# Patient Record
Sex: MALE | Race: BLACK OR AFRICAN AMERICAN | Employment: OTHER
[De-identification: names, ages, dates, MRNs, and addresses within clinical notes are randomized per-mention and may not be internally consistent; named-entity substitution may affect disease eponyms.]

---

## 2017-01-30 RX ORDER — FUROSEMIDE 80 MG
TABLET ORAL
Qty: 30 TABLET | Refills: 3 | Status: SHIPPED | OUTPATIENT
Start: 2017-01-30 | End: 2017-02-28 | Stop reason: SDUPTHER

## 2017-01-30 RX ORDER — ATORVASTATIN CALCIUM 40 MG/1
TABLET, FILM COATED ORAL
Qty: 30 TABLET | Refills: 3 | Status: SHIPPED | OUTPATIENT
Start: 2017-01-30 | End: 2017-06-26 | Stop reason: SDUPTHER

## 2017-02-06 ENCOUNTER — TELEPHONE (OUTPATIENT)
Dept: FAMILY MEDICINE CLINIC | Facility: CLINIC | Age: 67
End: 2017-02-06

## 2017-02-06 DIAGNOSIS — E11.9 TYPE 2 DIABETES MELLITUS WITHOUT COMPLICATION, WITH LONG-TERM CURRENT USE OF INSULIN (HCC): Primary | ICD-10-CM

## 2017-02-06 DIAGNOSIS — Z79.4 TYPE 2 DIABETES MELLITUS WITHOUT COMPLICATION, WITH LONG-TERM CURRENT USE OF INSULIN (HCC): Primary | ICD-10-CM

## 2017-02-15 RX ORDER — METOPROLOL SUCCINATE 25 MG/1
TABLET, EXTENDED RELEASE ORAL
Qty: 30 TABLET | Refills: 3 | Status: SHIPPED | OUTPATIENT
Start: 2017-02-15 | End: 2017-05-24 | Stop reason: SDUPTHER

## 2017-02-28 ENCOUNTER — OFFICE VISIT (OUTPATIENT)
Dept: FAMILY MEDICINE CLINIC | Facility: CLINIC | Age: 67
End: 2017-02-28

## 2017-02-28 VITALS
SYSTOLIC BLOOD PRESSURE: 140 MMHG | DIASTOLIC BLOOD PRESSURE: 70 MMHG | BODY MASS INDEX: 23.62 KG/M2 | RESPIRATION RATE: 16 BRPM | WEIGHT: 184 LBS | OXYGEN SATURATION: 97 % | HEART RATE: 81 BPM

## 2017-02-28 DIAGNOSIS — Z13.1 DM (DIABETES MELLITUS SCREEN): Primary | ICD-10-CM

## 2017-02-28 DIAGNOSIS — E78.00 HIGH CHOLESTEROL: ICD-10-CM

## 2017-02-28 PROBLEM — N18.6 END STAGE KIDNEY DISEASE (HCC): Status: ACTIVE | Noted: 2017-02-23

## 2017-02-28 PROBLEM — I10 BENIGN ESSENTIAL HTN: Status: ACTIVE | Noted: 2017-02-23

## 2017-02-28 PROCEDURE — 99213 OFFICE O/P EST LOW 20 MIN: CPT | Performed by: FAMILY MEDICINE

## 2017-02-28 RX ORDER — FUROSEMIDE 80 MG
TABLET ORAL
Qty: 20 TABLET | Refills: 3 | Status: SHIPPED | OUTPATIENT
Start: 2017-02-28 | End: 2018-11-05 | Stop reason: ALTCHOICE

## 2017-02-28 RX ORDER — POTASSIUM CHLORIDE 20 MEQ/1
TABLET, EXTENDED RELEASE ORAL
Qty: 30 TABLET | Refills: 3 | Status: SHIPPED | OUTPATIENT
Start: 2017-02-28 | End: 2018-11-05

## 2017-03-06 RX ORDER — HYDRALAZINE HYDROCHLORIDE 25 MG/1
TABLET, FILM COATED ORAL
Qty: 120 TABLET | Refills: 0 | Status: SHIPPED | OUTPATIENT
Start: 2017-03-06 | End: 2017-04-10 | Stop reason: SDUPTHER

## 2017-03-28 ENCOUNTER — HOSPITAL ENCOUNTER (OUTPATIENT)
Dept: DIABETES SERVICES | Age: 67
Setting detail: THERAPIES SERIES
Discharge: HOME | End: 2017-03-28
Payer: COMMERCIAL

## 2017-03-28 VITALS
BODY MASS INDEX: 23.78 KG/M2 | SYSTOLIC BLOOD PRESSURE: 138 MMHG | WEIGHT: 185.19 LBS | HEART RATE: 75 BPM | DIASTOLIC BLOOD PRESSURE: 73 MMHG

## 2017-03-28 DIAGNOSIS — E11.8 TYPE 2 DIABETES MELLITUS WITH COMPLICATION, WITH LONG-TERM CURRENT USE OF INSULIN (HCC): Primary | ICD-10-CM

## 2017-03-28 DIAGNOSIS — Z79.4 TYPE 2 DIABETES MELLITUS WITH COMPLICATION, WITH LONG-TERM CURRENT USE OF INSULIN (HCC): Primary | ICD-10-CM

## 2017-03-28 PROCEDURE — G0108 DIAB MANAGE TRN  PER INDIV: HCPCS

## 2017-04-10 RX ORDER — ASCORBIC ACID, THIAMINE, RIBOFLAVIN, NIACINAMIDE, PYRIDOXINE, FOLIC ACID, COBALAMIN, BIOTIN, PANTOTHENIC ACID, ZINC 100; 1.5; 1.7; 20; 10; 1; 6; 300; 10; 5 MG/1; MG/1; MG/1; MG/1; MG/1; MG/1; UG/1; UG/1; MG/1; MG/1
1 TABLET, COATED ORAL DAILY
Qty: 30 TABLET | Refills: 0 | Status: SHIPPED | OUTPATIENT
Start: 2017-04-10 | End: 2017-05-12 | Stop reason: SDUPTHER

## 2017-04-10 RX ORDER — HYDRALAZINE HYDROCHLORIDE 25 MG/1
TABLET, FILM COATED ORAL
Qty: 120 TABLET | Refills: 0 | Status: SHIPPED | OUTPATIENT
Start: 2017-04-10 | End: 2017-05-11 | Stop reason: SDUPTHER

## 2017-04-10 RX ORDER — AMLODIPINE BESYLATE 10 MG/1
TABLET ORAL
Qty: 60 TABLET | Refills: 0 | Status: SHIPPED | OUTPATIENT
Start: 2017-04-10 | End: 2017-05-11 | Stop reason: SDUPTHER

## 2017-04-11 ENCOUNTER — HOSPITAL ENCOUNTER (OUTPATIENT)
Dept: DIABETES SERVICES | Age: 67
Setting detail: THERAPIES SERIES
Discharge: HOME | End: 2017-04-11
Payer: COMMERCIAL

## 2017-04-11 ENCOUNTER — TELEPHONE (OUTPATIENT)
Dept: FAMILY MEDICINE CLINIC | Age: 67
End: 2017-04-11

## 2017-04-11 DIAGNOSIS — E11.8 TYPE 2 DIABETES MELLITUS WITH COMPLICATION, WITH LONG-TERM CURRENT USE OF INSULIN (HCC): Primary | ICD-10-CM

## 2017-04-11 DIAGNOSIS — Z79.4 TYPE 2 DIABETES MELLITUS WITH COMPLICATION, WITH LONG-TERM CURRENT USE OF INSULIN (HCC): Primary | ICD-10-CM

## 2017-04-11 PROCEDURE — G0108 DIAB MANAGE TRN  PER INDIV: HCPCS

## 2017-05-09 ENCOUNTER — HOSPITAL ENCOUNTER (OUTPATIENT)
Dept: DIABETES SERVICES | Age: 67
Setting detail: THERAPIES SERIES
Discharge: HOME | End: 2017-05-09
Payer: COMMERCIAL

## 2017-05-09 DIAGNOSIS — E11.8 TYPE 2 DIABETES MELLITUS WITH COMPLICATION, WITH LONG-TERM CURRENT USE OF INSULIN (HCC): Primary | ICD-10-CM

## 2017-05-09 DIAGNOSIS — Z79.4 TYPE 2 DIABETES MELLITUS WITH COMPLICATION, WITH LONG-TERM CURRENT USE OF INSULIN (HCC): Primary | ICD-10-CM

## 2017-05-09 PROCEDURE — G0108 DIAB MANAGE TRN  PER INDIV: HCPCS

## 2017-05-11 RX ORDER — HYDRALAZINE HYDROCHLORIDE 25 MG/1
TABLET, FILM COATED ORAL
Qty: 120 TABLET | Refills: 3 | Status: SHIPPED | OUTPATIENT
Start: 2017-05-11 | End: 2018-11-05 | Stop reason: ALTCHOICE

## 2017-05-11 RX ORDER — AMLODIPINE BESYLATE 10 MG/1
TABLET ORAL
Qty: 60 TABLET | Refills: 3 | Status: SHIPPED | OUTPATIENT
Start: 2017-05-11 | End: 2017-09-09 | Stop reason: SDUPTHER

## 2017-05-12 ENCOUNTER — TELEPHONE (OUTPATIENT)
Dept: FAMILY MEDICINE CLINIC | Age: 67
End: 2017-05-12

## 2017-05-15 RX ORDER — ASCORBIC ACID, THIAMINE, RIBOFLAVIN, NIACINAMIDE, PYRIDOXINE, FOLIC ACID, COBALAMIN, BIOTIN, PANTOTHENIC ACID, ZINC 100; 1.5; 1.7; 20; 10; 1; 6; 300; 10; 5 MG/1; MG/1; MG/1; MG/1; MG/1; MG/1; UG/1; UG/1; MG/1; MG/1
1 TABLET, COATED ORAL DAILY
Qty: 30 TABLET | Refills: 3 | Status: SHIPPED | OUTPATIENT
Start: 2017-05-15 | End: 2017-06-29 | Stop reason: SDUPTHER

## 2017-05-23 ENCOUNTER — HOSPITAL ENCOUNTER (OUTPATIENT)
Dept: DIABETES SERVICES | Age: 67
Setting detail: THERAPIES SERIES
Discharge: HOME | End: 2017-05-23
Payer: COMMERCIAL

## 2017-05-23 DIAGNOSIS — E11.8 TYPE 2 DIABETES MELLITUS WITH COMPLICATION, WITH LONG-TERM CURRENT USE OF INSULIN (HCC): Primary | ICD-10-CM

## 2017-05-23 DIAGNOSIS — Z79.4 TYPE 2 DIABETES MELLITUS WITH COMPLICATION, WITH LONG-TERM CURRENT USE OF INSULIN (HCC): Primary | ICD-10-CM

## 2017-05-23 PROCEDURE — G0108 DIAB MANAGE TRN  PER INDIV: HCPCS

## 2017-05-25 RX ORDER — METOPROLOL SUCCINATE 25 MG/1
TABLET, EXTENDED RELEASE ORAL
Qty: 30 TABLET | Refills: 3 | Status: SHIPPED | OUTPATIENT
Start: 2017-05-25 | End: 2017-10-19 | Stop reason: SDUPTHER

## 2017-05-25 RX ORDER — HUMAN INSULIN 100 [USP'U]/ML
INJECTION, SUSPENSION SUBCUTANEOUS
Qty: 10 ML | Refills: 3 | Status: SHIPPED | OUTPATIENT
Start: 2017-05-25 | End: 2017-07-25 | Stop reason: DRUGHIGH

## 2017-06-01 ENCOUNTER — HOSPITAL ENCOUNTER (OUTPATIENT)
Dept: DIABETES SERVICES | Age: 67
Setting detail: THERAPIES SERIES
Discharge: HOME | End: 2017-06-01
Payer: COMMERCIAL

## 2017-06-01 DIAGNOSIS — E11.8 TYPE 2 DIABETES MELLITUS WITH COMPLICATION, WITH LONG-TERM CURRENT USE OF INSULIN (HCC): Primary | ICD-10-CM

## 2017-06-01 DIAGNOSIS — Z79.4 TYPE 2 DIABETES MELLITUS WITH COMPLICATION, WITH LONG-TERM CURRENT USE OF INSULIN (HCC): Primary | ICD-10-CM

## 2017-06-01 PROCEDURE — G0109 DIAB MANAGE TRN IND/GROUP: HCPCS

## 2017-06-27 RX ORDER — ATORVASTATIN CALCIUM 40 MG/1
TABLET, FILM COATED ORAL
Qty: 30 TABLET | Refills: 3 | Status: SHIPPED | OUTPATIENT
Start: 2017-06-27 | End: 2017-11-20 | Stop reason: SDUPTHER

## 2017-06-27 RX ORDER — LIDOCAINE AND PRILOCAINE 25; 25 MG/G; MG/G
CREAM TOPICAL
Qty: 30 G | Refills: 3 | Status: SHIPPED | OUTPATIENT
Start: 2017-06-27 | End: 2018-11-05

## 2017-06-29 ENCOUNTER — OFFICE VISIT (OUTPATIENT)
Dept: FAMILY MEDICINE CLINIC | Age: 67
End: 2017-06-29
Payer: COMMERCIAL

## 2017-06-29 VITALS
WEIGHT: 185 LBS | DIASTOLIC BLOOD PRESSURE: 60 MMHG | SYSTOLIC BLOOD PRESSURE: 120 MMHG | RESPIRATION RATE: 16 BRPM | OXYGEN SATURATION: 98 % | HEART RATE: 75 BPM | BODY MASS INDEX: 23.75 KG/M2

## 2017-06-29 DIAGNOSIS — Z79.4 TYPE 2 DIABETES MELLITUS WITH DIABETIC POLYNEUROPATHY, WITH LONG-TERM CURRENT USE OF INSULIN (HCC): ICD-10-CM

## 2017-06-29 DIAGNOSIS — M79.18 CERVICAL MYOFASCIAL PAIN SYNDROME: ICD-10-CM

## 2017-06-29 DIAGNOSIS — E11.42 TYPE 2 DIABETES MELLITUS WITH DIABETIC POLYNEUROPATHY, WITH LONG-TERM CURRENT USE OF INSULIN (HCC): ICD-10-CM

## 2017-06-29 DIAGNOSIS — I10 BENIGN ESSENTIAL HTN: Primary | ICD-10-CM

## 2017-06-29 PROCEDURE — 99214 OFFICE O/P EST MOD 30 MIN: CPT | Performed by: FAMILY MEDICINE

## 2017-06-29 RX ORDER — INSULIN ASPART 100 [IU]/ML
20 INJECTION, SUSPENSION SUBCUTANEOUS
COMMUNITY
End: 2018-11-05

## 2017-06-29 RX ORDER — ASCORBIC ACID, THIAMINE, RIBOFLAVIN, NIACINAMIDE, PYRIDOXINE, FOLIC ACID, COBALAMIN, BIOTIN, PANTOTHENIC ACID, ZINC 100; 1.5; 1.7; 20; 10; 1; 6; 300; 10; 5 MG/1; MG/1; MG/1; MG/1; MG/1; MG/1; UG/1; UG/1; MG/1; MG/1
1 TABLET, COATED ORAL DAILY
Qty: 30 TABLET | Refills: 3 | Status: SHIPPED | OUTPATIENT
Start: 2017-06-29 | End: 2018-11-05 | Stop reason: ALTCHOICE

## 2017-06-29 ASSESSMENT — PATIENT HEALTH QUESTIONNAIRE - PHQ9
1. LITTLE INTEREST OR PLEASURE IN DOING THINGS: 0
SUM OF ALL RESPONSES TO PHQ9 QUESTIONS 1 & 2: 0
2. FEELING DOWN, DEPRESSED OR HOPELESS: 0
SUM OF ALL RESPONSES TO PHQ QUESTIONS 1-9: 0

## 2017-07-25 ENCOUNTER — OFFICE VISIT (OUTPATIENT)
Dept: FAMILY MEDICINE CLINIC | Age: 67
End: 2017-07-25
Payer: COMMERCIAL

## 2017-07-25 VITALS
SYSTOLIC BLOOD PRESSURE: 112 MMHG | HEIGHT: 74 IN | HEART RATE: 72 BPM | DIASTOLIC BLOOD PRESSURE: 74 MMHG | WEIGHT: 183.8 LBS | BODY MASS INDEX: 23.59 KG/M2

## 2017-07-25 DIAGNOSIS — L30.9 DERMATITIS: Primary | ICD-10-CM

## 2017-07-25 PROCEDURE — 99213 OFFICE O/P EST LOW 20 MIN: CPT

## 2017-08-09 ASSESSMENT — ENCOUNTER SYMPTOMS
COUGH: 0
SORE THROAT: 0
SHORTNESS OF BREATH: 0
NAIL CHANGES: 0
EYE PAIN: 0

## 2017-09-07 ENCOUNTER — OFFICE VISIT (OUTPATIENT)
Dept: FAMILY MEDICINE CLINIC | Age: 67
End: 2017-09-07
Payer: COMMERCIAL

## 2017-09-07 VITALS
SYSTOLIC BLOOD PRESSURE: 118 MMHG | HEART RATE: 54 BPM | OXYGEN SATURATION: 98 % | RESPIRATION RATE: 16 BRPM | WEIGHT: 176 LBS | DIASTOLIC BLOOD PRESSURE: 52 MMHG | BODY MASS INDEX: 22.6 KG/M2

## 2017-09-07 DIAGNOSIS — K59.03 DRUG-INDUCED CONSTIPATION: ICD-10-CM

## 2017-09-07 DIAGNOSIS — E11.69 TYPE 2 DIABETES MELLITUS WITH OTHER SPECIFIED COMPLICATION, UNSPECIFIED LONG TERM INSULIN USE STATUS: ICD-10-CM

## 2017-09-07 DIAGNOSIS — Z94.0 RENAL TRANSPLANT, STATUS POST: Primary | ICD-10-CM

## 2017-09-07 PROCEDURE — 99213 OFFICE O/P EST LOW 20 MIN: CPT | Performed by: FAMILY MEDICINE

## 2017-09-07 RX ORDER — MYCOPHENOLIC ACID 180 MG/1
180 TABLET, DELAYED RELEASE ORAL 2 TIMES DAILY
COMMUNITY
End: 2019-07-03 | Stop reason: ALTCHOICE

## 2017-09-07 RX ORDER — SULFAMETHOXAZOLE AND TRIMETHOPRIM 400; 80 MG/1; MG/1
1 TABLET ORAL 2 TIMES DAILY
COMMUNITY
End: 2017-12-12 | Stop reason: ALTCHOICE

## 2017-09-07 RX ORDER — VALGANCICLOVIR 450 MG/1
450 TABLET, FILM COATED ORAL DAILY
COMMUNITY
End: 2018-11-05

## 2017-09-07 RX ORDER — OXYCODONE HYDROCHLORIDE AND ACETAMINOPHEN 5; 325 MG/1; MG/1
1 TABLET ORAL EVERY 4 HOURS PRN
COMMUNITY
End: 2018-11-05

## 2017-09-07 RX ORDER — PREDNISONE 1 MG/1
10 TABLET ORAL DAILY
COMMUNITY
End: 2019-07-03 | Stop reason: ALTCHOICE

## 2017-09-07 RX ORDER — ONDANSETRON 4 MG/1
4 TABLET, ORALLY DISINTEGRATING ORAL EVERY 8 HOURS PRN
COMMUNITY
End: 2018-11-05

## 2017-09-11 RX ORDER — AMLODIPINE BESYLATE 10 MG/1
TABLET ORAL
Qty: 60 TABLET | Refills: 2 | Status: SHIPPED | OUTPATIENT
Start: 2017-09-11 | End: 2018-11-05 | Stop reason: ALTCHOICE

## 2017-09-27 ENCOUNTER — TELEPHONE (OUTPATIENT)
Dept: FAMILY MEDICINE CLINIC | Age: 67
End: 2017-09-27

## 2017-10-19 RX ORDER — METOPROLOL SUCCINATE 25 MG/1
TABLET, EXTENDED RELEASE ORAL
Qty: 30 TABLET | Refills: 1 | Status: SHIPPED | OUTPATIENT
Start: 2017-10-19 | End: 2017-11-20 | Stop reason: SDUPTHER

## 2017-10-19 NOTE — TELEPHONE ENCOUNTER
Next Visit Date:  Future Appointments  Date Time Provider Soham Kellyi   12/12/2017 1:00 PM Murali Palacio  [address] [facility]   Topic Date Due    AAA screen  1950    Diabetic microalbuminuria test  01/04/1969    DTaP/Tdap/Td vaccine (1 - Tdap) 01/04/1970    Zostavax vaccine  01/04/2011    Pneumococcal high/highest risk (2 of 2 - PPSV23) 10/23/2016    Diabetic retinal exam  08/16/2017    Flu vaccine (1) 09/01/2017    Diabetic foot exam  02/28/2018    Diabetic hemoglobin A1C test  08/25/2018    Lipid screen  10/11/2018    Colon cancer screen colonoscopy  05/01/2023       Hemoglobin A1C (%)   Date Value   08/25/2017 6.9 (H)   05/02/2017 7.5 (H)   07/12/2016 8.2 (H)             ( goal A1C is < 7)   No results found for: LABMICR  LDL Cholesterol (mg/dL)   Date Value   10/11/2017 132 (H)     LDL Calculated (mg/dL)   Date Value   11/04/2015 107       (goal LDL is <100)   AST (IU/L)   Date Value   10/11/2017 22     ALT (IU/L)   Date Value   10/11/2017 23     BUN (mg/dL)   Date Value   10/11/2017 25     BP Readings from Last 3 Encounters:   09/07/17 (!) 118/52   07/25/17 112/74   06/29/17 120/60          (goal 120/80)    All Future Testing planned in [facility]              Patient Active Problem List:     Diabetes mellitus ([facility] [address]     HTN (hypertension)     Elevated serum cholesterol     Renal insufficiency     Hepatitis C virus infection     Coronary artery disease involving native coronary artery without angina pectoris     End stage kidney disease (HCC)     Benign essential HTN

## 2017-10-25 ENCOUNTER — IMMUNIZATION (OUTPATIENT)
Dept: FAMILY MEDICINE CLINIC | Age: 67
End: 2017-10-25
Payer: COMMERCIAL

## 2017-10-25 DIAGNOSIS — Z23 IMMUNIZATION DUE: Primary | ICD-10-CM

## 2017-10-25 PROCEDURE — 90662 IIV NO PRSV INCREASED AG IM: CPT | Performed by: FAMILY MEDICINE

## 2017-10-25 PROCEDURE — 90471 IMMUNIZATION ADMIN: CPT | Performed by: FAMILY MEDICINE

## 2017-11-20 ENCOUNTER — TELEPHONE (OUTPATIENT)
Dept: FAMILY MEDICINE CLINIC | Age: 67
End: 2017-11-20

## 2017-11-20 RX ORDER — METOPROLOL SUCCINATE 25 MG/1
TABLET, EXTENDED RELEASE ORAL
Qty: 30 TABLET | Refills: 3 | Status: SHIPPED | OUTPATIENT
Start: 2017-11-20 | End: 2017-12-12 | Stop reason: SDUPTHER

## 2017-11-20 RX ORDER — ATORVASTATIN CALCIUM 40 MG/1
TABLET, FILM COATED ORAL
Qty: 30 TABLET | Refills: 3 | Status: SHIPPED | OUTPATIENT
Start: 2017-11-20 | End: 2018-02-16 | Stop reason: SDUPTHER

## 2017-11-20 NOTE — TELEPHONE ENCOUNTER
Next Visit Date:  Future Appointments  Date Time Provider Soham Kellyi   12/12/2017 1:00 PM Brina Loja  [address] [facility] Maintenance   Topic Date Due    AAA screen  1950    Diabetic microalbuminuria test  01/04/1969    DTaP/Tdap/Td vaccine (1 - Tdap) 01/04/1970    Zostavax vaccine  01/04/2011    Diabetic retinal exam  08/16/2017    Diabetic foot exam  02/28/2018    Diabetic hemoglobin A1C test  11/17/2018    Lipid screen  11/17/2018    Colon cancer screen colonoscopy  05/01/2023    Flu vaccine  Completed    Pneumococcal high/highest risk  Completed       Hemoglobin A1C (%)   Date Value   11/17/2017 7.5 (H)   08/25/2017 6.9 (H)   05/02/2017 7.5 (H)             ( goal A1C is < 7)   No results found for: LABMICR  LDL Cholesterol (mg/dL)   Date Value   11/17/2017 142 (H)     LDL Calculated (mg/dL)   Date Value   11/04/2015 107       (goal LDL is <100)   AST (IU/L)   Date Value   11/17/2017 19     ALT (IU/L)   Date Value   11/17/2017 20     BUN (mg/dL)   Date Value   11/17/2017 32 (H)     BP Readings from Last 3 Encounters:   09/07/17 (!) 118/52   07/25/17 112/74   06/29/17 120/60          (goal 120/80)    All Future Testing planned in [facility]              Patient Active Problem List:     Diabetes mellitus ([facility] [address]     HTN (hypertension)     Elevated serum cholesterol     Renal insufficiency     Hepatitis C virus infection     Coronary artery disease involving native coronary artery without angina pectoris     End stage kidney disease (HCC)     Benign essential HTN

## 2017-12-12 ENCOUNTER — OFFICE VISIT (OUTPATIENT)
Dept: FAMILY MEDICINE CLINIC | Age: 67
End: 2017-12-12
Payer: MEDICARE

## 2017-12-12 VITALS
RESPIRATION RATE: 16 BRPM | SYSTOLIC BLOOD PRESSURE: 130 MMHG | OXYGEN SATURATION: 98 % | HEART RATE: 78 BPM | WEIGHT: 176 LBS | BODY MASS INDEX: 22.6 KG/M2 | DIASTOLIC BLOOD PRESSURE: 80 MMHG

## 2017-12-12 DIAGNOSIS — L73.9 FOLLICULITIS: ICD-10-CM

## 2017-12-12 DIAGNOSIS — I10 ESSENTIAL HYPERTENSION: Primary | ICD-10-CM

## 2017-12-12 DIAGNOSIS — R19.7 DIARRHEA, UNSPECIFIED TYPE: ICD-10-CM

## 2017-12-12 PROCEDURE — 99214 OFFICE O/P EST MOD 30 MIN: CPT | Performed by: FAMILY MEDICINE

## 2017-12-12 RX ORDER — MAGNESIUM OXIDE 400 MG/1
400 TABLET ORAL 2 TIMES DAILY
COMMUNITY
End: 2018-11-27 | Stop reason: SDUPTHER

## 2017-12-12 RX ORDER — METOPROLOL SUCCINATE 25 MG/1
TABLET, EXTENDED RELEASE ORAL
Qty: 30 TABLET | Refills: 3 | Status: SHIPPED | OUTPATIENT
Start: 2017-12-12 | End: 2018-03-16 | Stop reason: SDUPTHER

## 2017-12-12 RX ORDER — CEPHALEXIN 500 MG/1
500 CAPSULE ORAL 2 TIMES DAILY
Qty: 20 CAPSULE | Refills: 0 | Status: SHIPPED | OUTPATIENT
Start: 2017-12-12 | End: 2018-04-13 | Stop reason: ALTCHOICE

## 2017-12-12 NOTE — PROGRESS NOTES
8 hours as needed for Nausea or Vomiting      predniSONE (DELTASONE) 5 MG tablet Take 5 mg by mouth daily      hydrocortisone (WESTCORT) 0.2 % cream Apply topically 2 times daily. 60 g 5    insulin aspart protamine-insulin aspart (NOVOLOG 70/30) (70-30) 100 UNIT/ML injection Inject 20 Units into the skin      B Complex-C-Zn-Folic Acid (DIALYVITE/ZINC) TABS Take 1 tablet by mouth daily 30 tablet 3    lidocaine-prilocaine (EMLA) 2.5-2.5 % cream APPLY TO AFFECTED AREA(S) TOPICALLY ONCE DAILY AS DIRECTED 30 g 3    hydrALAZINE (APRESOLINE) 25 MG tablet TAKE 1 TABLET BY MOUTH FOUR TIMES DAILY AS DIRECTED 120 tablet 3    furosemide (LASIX) 80 MG tablet TAKE 1 TABLET BY MOUTH ONCE DAILY 20 tablet 3    potassium chloride (KLOR-CON M) 20 MEQ extended release tablet TAKE 1 TABLET BY MOUTH ONCE DAILY 30 tablet 3    Elbasvir-Grazoprevir (ZEPATIER)  MG TABS Take by mouth      glucose blood VI test strips (TRUETEST TEST) strip 1 each by Does not apply route daily Pt testing bid 100 each 3    aspirin 325 MG tablet TAKE 1 BY MOUTH DAILY 90 tablet 1    Insulin Syringe-Needle U-100 (B-D INS SYRINGE 0.5CC/31GX5/16) 31G X 5/16\" 0.5 ML MISC Inject 1 each into the skin daily 100 each 3     No current facility-administered medications for this visit. Allergies   Allergen Reactions    Lisinopril        Social History   Substance Use Topics    Smoking status: Former Smoker     Packs/day: 0.50     Types: Cigarettes     Quit date: 4/2/2013    Smokeless tobacco: Never Used    Alcohol use No          Objective:      /80   Pulse 78   Resp 16   Wt 176 lb (79.8 kg)   SpO2 98%   BMI 22.60 kg/m²   General: Alert and oriented, in no distress   S1 and S2 normal, no murmurs, clicks, gallops or rubs. Regular rate and rhythm. Chest is clear; no wheezes or rales. No edema or JVD.   No carotid bruits  abd soft and non tender  Mild scalp folliculitis     Assessment:      Essential hypertension - stable  Hyperlipidemia - stable   Dm  folliculitis       Plan:       1)  Medication: continue current medication regimen unchanged  2)  Recheck in 3 months, sooner should new symptoms or problems arise.   Keflex  Imodium  bmp

## 2018-01-29 NOTE — TELEPHONE ENCOUNTER
Next Visit Date:  No future appointments.     Health Maintenance   Topic Date Due    AAA screen  1950    Diabetic microalbuminuria test  01/04/1969    DTaP/Tdap/Td vaccine (1 - Tdap) 01/04/1970    Zostavax vaccine  01/04/2011    Diabetic retinal exam  08/16/2017    Diabetic foot exam  02/28/2018    A1C test (Diabetic or Prediabetic)  11/17/2018    Lipid screen  01/10/2019    Potassium monitoring  01/10/2019    Creatinine monitoring  01/10/2019    Colon cancer screen colonoscopy  05/01/2023    Flu vaccine  Completed    Pneumococcal high/highest risk  Completed       Hemoglobin A1C (%)   Date Value   11/17/2017 7.5 (H)   08/25/2017 6.9 (H)   05/02/2017 7.5 (H)             ( goal A1C is < 7)   No results found for: LABMICR  LDL Cholesterol (mg/dL)   Date Value   01/10/2018 109     LDL Calculated (mg/dL)   Date Value   11/04/2015 107       (goal LDL is <100)   AST (IU/L)   Date Value   01/10/2018 18     ALT (IU/L)   Date Value   01/10/2018 22     BUN (mg/dL)   Date Value   01/10/2018 25     BP Readings from Last 3 Encounters:   12/12/17 130/80   09/07/17 (!) 118/52   07/25/17 112/74          (goal 120/80)    All Future Testing planned in [facility]  Lab Frequency Next Occurrence   Basic Metabolic Panel Once 63/71/8530               Patient Active Problem List:     Diabetes mellitus ([facility] [address]     HTN (hypertension)     Elevated serum cholesterol     Renal insufficiency     Hepatitis C virus infection     Coronary artery disease involving native coronary artery without angina pectoris     End stage kidney disease (HCC)     Benign essential HTN

## 2018-01-30 RX ORDER — LANCETS 33 GAUGE
EACH MISCELLANEOUS
Qty: 100 EACH | Refills: 3 | Status: SHIPPED | OUTPATIENT
Start: 2018-01-30 | End: 2018-04-13 | Stop reason: SDUPTHER

## 2018-02-18 RX ORDER — ATORVASTATIN CALCIUM 40 MG/1
TABLET, FILM COATED ORAL
Qty: 30 TABLET | Refills: 11 | Status: SHIPPED | OUTPATIENT
Start: 2018-02-18 | End: 2019-05-10 | Stop reason: SDUPTHER

## 2018-03-19 RX ORDER — METOPROLOL SUCCINATE 25 MG/1
TABLET, EXTENDED RELEASE ORAL
Qty: 30 TABLET | Refills: 11 | Status: SHIPPED | OUTPATIENT
Start: 2018-03-19 | End: 2018-10-19 | Stop reason: SDUPTHER

## 2018-08-29 RX ORDER — LANCETS 33 GAUGE
EACH MISCELLANEOUS
Qty: 100 EACH | Refills: 0 | Status: SHIPPED | OUTPATIENT
Start: 2018-08-29 | End: 2018-10-19 | Stop reason: SDUPTHER

## 2018-10-08 ENCOUNTER — TELEPHONE (OUTPATIENT)
Dept: FAMILY MEDICINE CLINIC | Age: 68
End: 2018-10-08

## 2018-10-08 NOTE — TELEPHONE ENCOUNTER
Patient would like you to send an order for celexa to his pharmacy. He was taking it at [facility].   He will call back with the dose because it is not on his med list.

## 2018-10-19 RX ORDER — METOPROLOL SUCCINATE 25 MG/1
TABLET, EXTENDED RELEASE ORAL
Qty: 30 TABLET | Refills: 11 | Status: SHIPPED | OUTPATIENT
Start: 2018-10-19 | End: 2018-11-05 | Stop reason: SDUPTHER

## 2018-10-19 NOTE — TELEPHONE ENCOUNTER
Last visit:12-12-17  Last Med refill:    Next Visit Date:  Future Appointments  Date Time Provider Soham Nguyen   10/29/2018 1:40 PM Gayatri Rosario  [address] [facility] Maintenance   Topic Date Due    AAA screen  1950    Diabetic microalbuminuria test  01/04/1969    DTaP/Tdap/Td vaccine (1 - Tdap) 01/04/1970    Shingles Vaccine (1 of 2 - 2 Dose Series) 01/04/2001    Diabetic retinal exam  08/16/2017    Diabetic foot exam  02/28/2018    Flu vaccine (1) 09/01/2018    A1C test (Diabetic or Prediabetic)  01/05/2019    Lipid screen  10/05/2019    Potassium monitoring  10/05/2019    Creatinine monitoring  10/05/2019    Colon cancer screen colonoscopy  05/01/2023    Pneumococcal high/highest risk  Completed       Hemoglobin A1C (%)   Date Value   10/05/2018 9.1 (H)   02/12/2018 8.2 (H)   11/17/2017 7.5 (H)             ( goal A1C is < 7)   No results found for: LABMICR  LDL Cholesterol (mg/dL)   Date Value   10/05/2018 81   06/22/2018 87     LDL Calculated (mg/dL)   Date Value   11/04/2015 107       (goal LDL is <100)   AST (IU/L)   Date Value   10/05/2018 17     ALT (IU/L)   Date Value   10/05/2018 23     BUN (mg/dL)   Date Value   10/05/2018 26 (H)     BP Readings from Last 3 Encounters:   12/12/17 130/80   09/07/17 (!) 118/52   07/25/17 112/74          (goal 120/80)    All Future Testing planned in [facility]  Lab Frequency Next Occurrence               Patient Active Problem List:     Diabetes mellitus ([facility] [address]     HTN (hypertension)     Elevated serum cholesterol     Renal insufficiency     Hepatitis C virus infection     Coronary artery disease involving native coronary artery without angina pectoris     End stage kidney disease (HCC)     Benign essential HTN

## 2018-10-22 ENCOUNTER — TELEPHONE (OUTPATIENT)
Dept: FAMILY MEDICINE CLINIC | Age: 68
End: 2018-10-22

## 2018-11-05 ENCOUNTER — OFFICE VISIT (OUTPATIENT)
Dept: FAMILY MEDICINE CLINIC | Age: 68
End: 2018-11-05
Payer: COMMERCIAL

## 2018-11-05 VITALS
OXYGEN SATURATION: 98 % | HEART RATE: 67 BPM | SYSTOLIC BLOOD PRESSURE: 134 MMHG | DIASTOLIC BLOOD PRESSURE: 76 MMHG | BODY MASS INDEX: 21.72 KG/M2 | WEIGHT: 169.2 LBS

## 2018-11-05 DIAGNOSIS — Z79.4 TYPE 2 DIABETES MELLITUS WITH DIABETIC POLYNEUROPATHY, WITH LONG-TERM CURRENT USE OF INSULIN (HCC): ICD-10-CM

## 2018-11-05 DIAGNOSIS — E78.00 HIGH CHOLESTEROL: ICD-10-CM

## 2018-11-05 DIAGNOSIS — I10 ESSENTIAL HYPERTENSION: Primary | ICD-10-CM

## 2018-11-05 DIAGNOSIS — M25.511 CHRONIC RIGHT SHOULDER PAIN: ICD-10-CM

## 2018-11-05 DIAGNOSIS — E11.42 TYPE 2 DIABETES MELLITUS WITH DIABETIC POLYNEUROPATHY, WITH LONG-TERM CURRENT USE OF INSULIN (HCC): ICD-10-CM

## 2018-11-05 DIAGNOSIS — G89.29 CHRONIC RIGHT SHOULDER PAIN: ICD-10-CM

## 2018-11-05 DIAGNOSIS — Z94.0 RENAL TRANSPLANT, STATUS POST: ICD-10-CM

## 2018-11-05 DIAGNOSIS — Z23 NEED FOR IMMUNIZATION AGAINST INFLUENZA: ICD-10-CM

## 2018-11-05 PROCEDURE — G8598 ASA/ANTIPLAT THER USED: HCPCS | Performed by: FAMILY MEDICINE

## 2018-11-05 PROCEDURE — 1036F TOBACCO NON-USER: CPT | Performed by: FAMILY MEDICINE

## 2018-11-05 PROCEDURE — 90662 IIV NO PRSV INCREASED AG IM: CPT | Performed by: FAMILY MEDICINE

## 2018-11-05 PROCEDURE — 2022F DILAT RTA XM EVC RTNOPTHY: CPT | Performed by: FAMILY MEDICINE

## 2018-11-05 PROCEDURE — G8427 DOCREV CUR MEDS BY ELIG CLIN: HCPCS | Performed by: FAMILY MEDICINE

## 2018-11-05 PROCEDURE — G8420 CALC BMI NORM PARAMETERS: HCPCS | Performed by: FAMILY MEDICINE

## 2018-11-05 PROCEDURE — 3046F HEMOGLOBIN A1C LEVEL >9.0%: CPT | Performed by: FAMILY MEDICINE

## 2018-11-05 PROCEDURE — 4040F PNEUMOC VAC/ADMIN/RCVD: CPT | Performed by: FAMILY MEDICINE

## 2018-11-05 PROCEDURE — 99214 OFFICE O/P EST MOD 30 MIN: CPT | Performed by: FAMILY MEDICINE

## 2018-11-05 PROCEDURE — 1101F PT FALLS ASSESS-DOCD LE1/YR: CPT | Performed by: FAMILY MEDICINE

## 2018-11-05 PROCEDURE — G0008 ADMIN INFLUENZA VIRUS VAC: HCPCS | Performed by: FAMILY MEDICINE

## 2018-11-05 PROCEDURE — G8482 FLU IMMUNIZE ORDER/ADMIN: HCPCS | Performed by: FAMILY MEDICINE

## 2018-11-05 PROCEDURE — 3017F COLORECTAL CA SCREEN DOC REV: CPT | Performed by: FAMILY MEDICINE

## 2018-11-05 PROCEDURE — 1123F ACP DISCUSS/DSCN MKR DOCD: CPT | Performed by: FAMILY MEDICINE

## 2018-11-05 RX ORDER — METOPROLOL SUCCINATE 25 MG/1
TABLET, EXTENDED RELEASE ORAL
Qty: 30 TABLET | Refills: 11 | Status: SHIPPED | OUTPATIENT
Start: 2018-11-05 | End: 2019-11-07 | Stop reason: SDUPTHER

## 2018-11-05 RX ORDER — TAMSULOSIN HYDROCHLORIDE 0.4 MG/1
0.4 CAPSULE ORAL DAILY
COMMUNITY
End: 2018-11-27 | Stop reason: SDUPTHER

## 2018-11-05 RX ORDER — TACROLIMUS 5 MG/1
6 CAPSULE ORAL 2 TIMES DAILY
COMMUNITY
End: 2018-11-05

## 2018-11-05 RX ORDER — CITALOPRAM 20 MG/1
20 TABLET ORAL DAILY
Qty: 30 TABLET | Refills: 11 | Status: SHIPPED | OUTPATIENT
Start: 2018-11-05 | End: 2019-07-03 | Stop reason: ALTCHOICE

## 2018-11-05 RX ORDER — PREDNISONE 1 MG/1
10 TABLET ORAL DAILY
Qty: 60 TABLET | Refills: 11 | Status: CANCELLED | OUTPATIENT
Start: 2018-11-05

## 2018-11-05 ASSESSMENT — PATIENT HEALTH QUESTIONNAIRE - PHQ9
1. LITTLE INTEREST OR PLEASURE IN DOING THINGS: 0
SUM OF ALL RESPONSES TO PHQ9 QUESTIONS 1 & 2: 0
SUM OF ALL RESPONSES TO PHQ QUESTIONS 1-9: 0
2. FEELING DOWN, DEPRESSED OR HOPELESS: 0
SUM OF ALL RESPONSES TO PHQ QUESTIONS 1-9: 0

## 2018-11-05 NOTE — PROGRESS NOTES
Subjective:      Patient ID: Ashwin Gordillo is a 80 y.o. male. HPI     Here to follow up HTN    He complains of right shoulder pain that comes and goes. Does not seem to be associated with any particular activity  He notes that he started using crack cocaine again but has been clean for the past 6 weeks    History of HTN, takes metoprolol  History of HLD, takes lipitor   History of DM, follows with endocrinology, takes lantus 18 units nightly and humalog per a sliding scale. Last A1C was 9.1. Follows with Dr. Tasneem Ham. He will see her in 2 months. He is s/p renal transplant (ESRD 2/2 DM), now off dialysis. Follows with Dr. Aurelio Kim. Previously treated for hepatitis C    Review of Systems   Except as noted in HPI, 10 point ROS negative    Objective:   Physical Exam   Constitutional: He is oriented to person, place, and time. He appears well-developed and well-nourished. No distress. HENT:   Head: Normocephalic and atraumatic. Eyes: Conjunctivae are normal.   Cardiovascular: Normal rate, regular rhythm and normal heart sounds. Pulmonary/Chest: Effort normal. He has no wheezes. Musculoskeletal: He exhibits no edema. Positive cross arm, rest of rotator cuff testing negative. Spurling negative   Neurological: He is alert and oriented to person, place, and time. Skin: Skin is warm and dry. Psychiatric: He has a normal mood and affect. His behavior is normal. Judgment and thought content normal.   Vitals reviewed. Assessment:      1. Essential hypertension    2. High cholesterol    3. Type 2 diabetes mellitus with diabetic polyneuropathy, with long-term current use of insulin ([facility] [address]    4. Renal transplant, status post    5. Chronic right shoulder pain    6. Need for immunization against influenza          Plan:      1. Well controlled, continue current regimen  2. Reviewed lipid panel. Continue statin  3. Refilled insulin, pt has upcoming appt with endocrinology.  Encouraged he follow up with this as his

## 2018-11-27 RX ORDER — MAGNESIUM OXIDE 400 MG/1
400 TABLET ORAL 2 TIMES DAILY
Qty: 30 TABLET | Refills: 11 | Status: SHIPPED | OUTPATIENT
Start: 2018-11-27 | End: 2019-01-08 | Stop reason: SDUPTHER

## 2018-11-27 RX ORDER — TAMSULOSIN HYDROCHLORIDE 0.4 MG/1
0.4 CAPSULE ORAL DAILY
Qty: 30 CAPSULE | Refills: 11 | Status: SHIPPED | OUTPATIENT
Start: 2018-11-27 | End: 2019-03-15 | Stop reason: SDUPTHER

## 2018-12-05 RX ORDER — PREDNISONE 1 MG/1
10 TABLET ORAL DAILY
Status: CANCELLED | OUTPATIENT
Start: 2018-12-05

## 2019-01-08 RX ORDER — MAGNESIUM OXIDE 400 MG/1
400 TABLET ORAL 2 TIMES DAILY
Qty: 60 TABLET | Refills: 11 | Status: SHIPPED | OUTPATIENT
Start: 2019-01-08 | End: 2019-12-23 | Stop reason: SDUPTHER

## 2019-02-01 ENCOUNTER — TELEPHONE (OUTPATIENT)
Dept: FAMILY MEDICINE CLINIC | Age: 69
End: 2019-02-01

## 2019-03-15 RX ORDER — TAMSULOSIN HYDROCHLORIDE 0.4 MG/1
0.4 CAPSULE ORAL DAILY
Qty: 30 CAPSULE | Refills: 11 | Status: SHIPPED | OUTPATIENT
Start: 2019-03-15 | End: 2019-12-23 | Stop reason: SDUPTHER

## 2019-05-10 DIAGNOSIS — E11.8 TYPE 2 DIABETES MELLITUS WITH COMPLICATION, UNSPECIFIED WHETHER LONG TERM INSULIN USE: Primary | ICD-10-CM

## 2019-05-10 RX ORDER — BLOOD-GLUCOSE METER
KIT MISCELLANEOUS
Qty: 1 KIT | Refills: 0 | Status: SHIPPED | OUTPATIENT
Start: 2019-05-10 | End: 2020-11-19

## 2019-05-10 RX ORDER — ATORVASTATIN CALCIUM 40 MG/1
TABLET, FILM COATED ORAL
Qty: 30 TABLET | Refills: 0 | Status: SHIPPED | OUTPATIENT
Start: 2019-05-10 | End: 2019-06-10 | Stop reason: SDUPTHER

## 2019-05-10 NOTE — TELEPHONE ENCOUNTER
Patient also needs a glucometer. They have true test glucometer available          Next Visit Date:  No future appointments.     Health Maintenance   Topic Date Due    AAA screen  1950    Diabetic microalbuminuria test  01/04/1969    Hepatitis B Vaccine (1 of 3 - Risk Recombivax 3-dose series) 01/04/1970    DTaP/Tdap/Td vaccine (1 - Tdap) 01/04/1970    Shingles Vaccine (1 of 2) 01/04/2001    Diabetic retinal exam  08/16/2017    Diabetic foot exam  02/28/2018    A1C test (Diabetic or Prediabetic)  01/05/2019    Lipid screen  12/27/2019    Potassium monitoring  12/27/2019    Creatinine monitoring  12/27/2019    Colon cancer screen colonoscopy  05/01/2023    Flu vaccine  Completed    Pneumococcal 65+ years Vaccine  Completed    HPV vaccine  Aged Out       Hemoglobin A1C (%)   Date Value   10/05/2018 9.1 (H)   02/12/2018 8.2 (H)   11/17/2017 7.5 (H)             ( goal A1C is < 7)   No results found for: LABMICR  LDL Cholesterol (mg/dL)   Date Value   12/27/2018 75   12/06/2018 57     LDL Calculated (mg/dL)   Date Value   11/04/2015 107       (goal LDL is <100)   AST (IU/L)   Date Value   12/27/2018 19     ALT (IU/L)   Date Value   12/27/2018 20     BUN (mg/dL)   Date Value   12/27/2018 20     BP Readings from Last 3 Encounters:   11/05/18 134/76   12/12/17 130/80   09/07/17 (!) 118/52          (goal 120/80)    All Future Testing planned in [facility]  Lab Frequency Next Occurrence               Patient Active Problem List:     Diabetes mellitus ([facility] [address]     HTN (hypertension)     Elevated serum cholesterol     Renal insufficiency     Hepatitis C virus infection     Coronary artery disease involving native coronary artery without angina pectoris     End stage kidney disease (HCC)     Benign essential HTN

## 2019-07-03 ENCOUNTER — OFFICE VISIT (OUTPATIENT)
Dept: FAMILY MEDICINE CLINIC | Age: 69
End: 2019-07-03
Payer: COMMERCIAL

## 2019-07-03 VITALS
SYSTOLIC BLOOD PRESSURE: 136 MMHG | BODY MASS INDEX: 23.1 KG/M2 | HEART RATE: 68 BPM | WEIGHT: 180 LBS | TEMPERATURE: 98 F | DIASTOLIC BLOOD PRESSURE: 80 MMHG | OXYGEN SATURATION: 99 % | HEIGHT: 74 IN

## 2019-07-03 DIAGNOSIS — R53.83 FATIGUE, UNSPECIFIED TYPE: Primary | ICD-10-CM

## 2019-07-03 DIAGNOSIS — M25.552 CHRONIC LEFT HIP PAIN: ICD-10-CM

## 2019-07-03 DIAGNOSIS — R05.3 CHRONIC COUGH: ICD-10-CM

## 2019-07-03 DIAGNOSIS — G89.29 CHRONIC LEFT HIP PAIN: ICD-10-CM

## 2019-07-03 PROCEDURE — G8427 DOCREV CUR MEDS BY ELIG CLIN: HCPCS | Performed by: NURSE PRACTITIONER

## 2019-07-03 PROCEDURE — 1123F ACP DISCUSS/DSCN MKR DOCD: CPT | Performed by: NURSE PRACTITIONER

## 2019-07-03 PROCEDURE — 99215 OFFICE O/P EST HI 40 MIN: CPT | Performed by: NURSE PRACTITIONER

## 2019-07-03 PROCEDURE — G8420 CALC BMI NORM PARAMETERS: HCPCS | Performed by: NURSE PRACTITIONER

## 2019-07-03 PROCEDURE — 4040F PNEUMOC VAC/ADMIN/RCVD: CPT | Performed by: NURSE PRACTITIONER

## 2019-07-03 PROCEDURE — 3017F COLORECTAL CA SCREEN DOC REV: CPT | Performed by: NURSE PRACTITIONER

## 2019-07-03 PROCEDURE — 4004F PT TOBACCO SCREEN RCVD TLK: CPT | Performed by: NURSE PRACTITIONER

## 2019-07-03 PROCEDURE — G8599 NO ASA/ANTIPLAT THER USE RNG: HCPCS | Performed by: NURSE PRACTITIONER

## 2019-07-03 ASSESSMENT — ENCOUNTER SYMPTOMS
BLOOD IN STOOL: 0
NAUSEA: 0
EYES NEGATIVE: 1
ALLERGIC/IMMUNOLOGIC NEGATIVE: 1
WHEEZING: 0
ABDOMINAL PAIN: 0
DIARRHEA: 1
COUGH: 1
CHOKING: 0
CHEST TIGHTNESS: 0
SHORTNESS OF BREATH: 0
STRIDOR: 0
CONSTIPATION: 0
VISUAL CHANGE: 0
VOMITING: 0
SORE THROAT: 0
CHANGE IN BOWEL HABIT: 1
ANAL BLEEDING: 0

## 2019-07-03 ASSESSMENT — PATIENT HEALTH QUESTIONNAIRE - PHQ9
SUM OF ALL RESPONSES TO PHQ9 QUESTIONS 1 & 2: 0
1. LITTLE INTEREST OR PLEASURE IN DOING THINGS: 0
SUM OF ALL RESPONSES TO PHQ QUESTIONS 1-9: 0
2. FEELING DOWN, DEPRESSED OR HOPELESS: 0
SUM OF ALL RESPONSES TO PHQ QUESTIONS 1-9: 0

## 2019-07-03 NOTE — PROGRESS NOTES
no friction rub. No murmur heard. Pulmonary/Chest: Effort normal and breath sounds normal. No accessory muscle usage or stridor. No tachypnea. No respiratory distress. He has no decreased breath sounds. He has no wheezes. He has no rhonchi. He has no rales. Abdominal: Soft. Bowel sounds are normal. He exhibits no distension. There is no tenderness. There is no rigidity and no guarding. Musculoskeletal: Normal range of motion. He exhibits no edema (no edema), tenderness or deformity. Neurological: He is alert and oriented to person, place, and time. He has normal strength. He displays no atrophy and no tremor. He displays no seizure activity. Gait normal. GCS eye subscore is 4. GCS verbal subscore is 5. GCS motor subscore is 6. Skin: Skin is warm, dry and intact. No rash noted. He is not diaphoretic. No erythema. No pallor. Psychiatric: He has a normal mood and affect. His speech is normal and behavior is normal. Judgment and thought content normal. Cognition and memory are normal.         Assessment:         1. Fatigue, unspecified type    2. Chronic cough    3. Chronic left hip pain        Plan:     1. Fatigue, unspecified type    - CBC Auto Differential; Future  - Comprehensive Metabolic Panel; Future  - TSH; Future  - T4, Free; Future    Uncontrolled diabetes may be attributing to above  R/o other obvious process   Consider GI referral for diarrhea in future     2. Chronic cough    - XR CHEST STANDARD (2 VW); Future    Tobacco abuse risk factors  Declines h/o COPD/asthma/GERD   Consider pulm. Referral in future     3. Chronic left hip pain    - XR HIP LEFT (2-3 VIEWS); Future    May need ortho referral     Start with above  Follow-up with PCP in 4 weeks for further evaluation      Discussed use, benefit, and side effects of prescribed medications. All patient questions answered. Pt voiced understanding. Reviewed health maintenance. Instructed to continue current medications, diet and exercise.

## 2019-07-19 ENCOUNTER — OFFICE VISIT (OUTPATIENT)
Dept: FAMILY MEDICINE CLINIC | Age: 69
End: 2019-07-19
Payer: COMMERCIAL

## 2019-07-19 ENCOUNTER — TELEPHONE (OUTPATIENT)
Dept: FAMILY MEDICINE CLINIC | Age: 69
End: 2019-07-19

## 2019-07-19 VITALS
BODY MASS INDEX: 23.32 KG/M2 | OXYGEN SATURATION: 97 % | DIASTOLIC BLOOD PRESSURE: 72 MMHG | HEART RATE: 68 BPM | SYSTOLIC BLOOD PRESSURE: 120 MMHG | WEIGHT: 181.6 LBS

## 2019-07-19 DIAGNOSIS — E11.8 TYPE 2 DIABETES MELLITUS WITH COMPLICATION, UNSPECIFIED WHETHER LONG TERM INSULIN USE: ICD-10-CM

## 2019-07-19 DIAGNOSIS — I10 ESSENTIAL HYPERTENSION: Primary | ICD-10-CM

## 2019-07-19 DIAGNOSIS — K52.9 CHRONIC DIARRHEA: ICD-10-CM

## 2019-07-19 DIAGNOSIS — E78.00 HIGH CHOLESTEROL: ICD-10-CM

## 2019-07-19 PROCEDURE — G8599 NO ASA/ANTIPLAT THER USE RNG: HCPCS | Performed by: FAMILY MEDICINE

## 2019-07-19 PROCEDURE — 2022F DILAT RTA XM EVC RTNOPTHY: CPT | Performed by: FAMILY MEDICINE

## 2019-07-19 PROCEDURE — 3046F HEMOGLOBIN A1C LEVEL >9.0%: CPT | Performed by: FAMILY MEDICINE

## 2019-07-19 PROCEDURE — 4004F PT TOBACCO SCREEN RCVD TLK: CPT | Performed by: FAMILY MEDICINE

## 2019-07-19 PROCEDURE — 3017F COLORECTAL CA SCREEN DOC REV: CPT | Performed by: FAMILY MEDICINE

## 2019-07-19 PROCEDURE — 1123F ACP DISCUSS/DSCN MKR DOCD: CPT | Performed by: FAMILY MEDICINE

## 2019-07-19 PROCEDURE — G8420 CALC BMI NORM PARAMETERS: HCPCS | Performed by: FAMILY MEDICINE

## 2019-07-19 PROCEDURE — 4040F PNEUMOC VAC/ADMIN/RCVD: CPT | Performed by: FAMILY MEDICINE

## 2019-07-19 PROCEDURE — G8427 DOCREV CUR MEDS BY ELIG CLIN: HCPCS | Performed by: FAMILY MEDICINE

## 2019-07-19 PROCEDURE — 99214 OFFICE O/P EST MOD 30 MIN: CPT | Performed by: FAMILY MEDICINE

## 2019-07-19 RX ORDER — ACETAMINOPHEN 500 MG
500 TABLET ORAL EVERY 6 HOURS PRN
COMMUNITY
End: 2019-09-25 | Stop reason: ALTCHOICE

## 2019-07-19 RX ORDER — TACROLIMUS 1 MG/1
6 CAPSULE ORAL 2 TIMES DAILY
COMMUNITY

## 2019-07-20 NOTE — TELEPHONE ENCOUNTER
Please request patient's labs from [facility] (ordered by his transplant physician, not us)

## 2019-07-25 ENCOUNTER — TELEPHONE (OUTPATIENT)
Dept: GASTROENTEROLOGY | Age: 69
End: 2019-07-25

## 2019-08-12 NOTE — TELEPHONE ENCOUNTER
Last visit: 7/19/19  Last Med refill: 10-19-18  Does patient have enough medication for 72 hours: Yes    Next Visit Date:  Future Appointments   Date Time Provider Soham Nguyen   9/19/2019  2:30 PM Vickie Fowler MD [facility] DEANDRA  [facility]   1/22/2020  2:15 PM Cuca Robison  [address] [facility] Maintenance   Topic Date Due    AAA screen  1950    Diabetic microalbuminuria test  01/04/1969    Hepatitis B Vaccine (1 of 3 - Risk Recombivax 3-dose series) 01/04/1970    DTaP/Tdap/Td vaccine (1 - Tdap) 01/04/1970    Shingles Vaccine (1 of 2) 01/04/2001    Diabetic retinal exam  08/16/2017    Diabetic foot exam  02/28/2018    A1C test (Diabetic or Prediabetic)  01/05/2019    Flu vaccine (1) 09/01/2019    Lipid screen  12/27/2019    Potassium monitoring  12/27/2019    Creatinine monitoring  12/27/2019    Colon cancer screen colonoscopy  05/01/2023    Pneumococcal 65+ years Vaccine  Completed    HPV vaccine  Aged Out       Hemoglobin A1C (%)   Date Value   10/05/2018 9.1 (H)   02/12/2018 8.2 (H)   11/17/2017 7.5 (H)             ( goal A1C is < 7)   No results found for: LABMICR  LDL Cholesterol (mg/dL)   Date Value   12/27/2018 75   12/06/2018 57     LDL Calculated (mg/dL)   Date Value   11/04/2015 107       (goal LDL is <100)   AST (IU/L)   Date Value   12/27/2018 19     ALT (IU/L)   Date Value   12/27/2018 20     BUN (mg/dL)   Date Value   12/27/2018 20     BP Readings from Last 3 Encounters:   07/19/19 120/72   07/03/19 136/80   11/05/18 134/76          (goal 120/80)    All Future Testing planned in [facility]  Lab Frequency Next Occurrence   CBC Auto Differential Once 07/10/2019   Comprehensive Metabolic Panel Once 46/86/0896   TSH Once 07/10/2019   T4, Free Once 07/10/2019   XR CHEST STANDARD (2 VW) Once 07/10/2019   XR HIP LEFT (2-3 VIEWS) Once 07/10/2019               Patient Active Problem List:     Diabetes mellitus ([address]     HTN (hypertension)     Elevated serum cholesterol

## 2019-09-12 PROBLEM — Z72.0 TOBACCO USER: Status: ACTIVE | Noted: 2019-09-12

## 2019-09-12 PROBLEM — Z86.010 ENCOUNTER FOR COLONOSCOPY DUE TO HISTORY OF ADENOMATOUS COLONIC POLYPS: Status: ACTIVE | Noted: 2018-08-13

## 2019-09-12 PROBLEM — K52.9 CHRONIC DIARRHEA: Status: ACTIVE | Noted: 2019-09-12

## 2019-09-12 PROBLEM — Z80.0 FAMILY HX OF COLON CANCER REQUIRING SCREENING COLONOSCOPY: Status: ACTIVE | Noted: 2018-08-13

## 2019-09-12 PROBLEM — Z94.0 HISTORY OF RENAL TRANSPLANT: Status: ACTIVE | Noted: 2018-06-13

## 2019-09-12 PROBLEM — Z12.11 ENCOUNTER FOR COLONOSCOPY DUE TO HISTORY OF ADENOMATOUS COLONIC POLYPS: Status: ACTIVE | Noted: 2018-08-13

## 2019-09-12 PROBLEM — R19.7 DIARRHEA: Status: ACTIVE | Noted: 2019-09-12

## 2019-09-12 PROBLEM — E78.5 HYPERLIPIDEMIA: Status: ACTIVE | Noted: 2019-09-12

## 2019-09-12 PROBLEM — Z86.0101 ENCOUNTER FOR COLONOSCOPY DUE TO HISTORY OF ADENOMATOUS COLONIC POLYPS: Status: ACTIVE | Noted: 2018-08-13

## 2019-09-12 PROBLEM — Z91.199 GENERAL PATIENT NONCOMPLIANCE: Status: ACTIVE | Noted: 2019-09-12

## 2019-09-16 ENCOUNTER — TELEPHONE (OUTPATIENT)
Dept: FAMILY MEDICINE CLINIC | Age: 69
End: 2019-09-16

## 2019-09-16 NOTE — TELEPHONE ENCOUNTER
I dont' know how much he gives/total. He follows with Dr. Lee Jo. I'm not sure what his sliding scale is. Can you please call the patient and ask and then we can add it to his RX.

## 2019-09-19 ENCOUNTER — OFFICE VISIT (OUTPATIENT)
Dept: GASTROENTEROLOGY | Age: 69
End: 2019-09-19
Payer: COMMERCIAL

## 2019-09-19 DIAGNOSIS — B18.2 CHRONIC HEPATITIS C WITHOUT HEPATIC COMA (HCC): ICD-10-CM

## 2019-09-19 DIAGNOSIS — Z94.0 S/P KIDNEY TRANSPLANT: Primary | ICD-10-CM

## 2019-09-19 DIAGNOSIS — K52.9 CHRONIC DIARRHEA: ICD-10-CM

## 2019-09-19 DIAGNOSIS — R10.84 ABDOMINAL PAIN, GENERALIZED: ICD-10-CM

## 2019-09-19 PROCEDURE — 99214 OFFICE O/P EST MOD 30 MIN: CPT | Performed by: INTERNAL MEDICINE

## 2019-09-19 PROCEDURE — G8427 DOCREV CUR MEDS BY ELIG CLIN: HCPCS | Performed by: INTERNAL MEDICINE

## 2019-09-19 PROCEDURE — G8420 CALC BMI NORM PARAMETERS: HCPCS | Performed by: INTERNAL MEDICINE

## 2019-09-19 ASSESSMENT — ENCOUNTER SYMPTOMS
TROUBLE SWALLOWING: 0
ABDOMINAL PAIN: 1
WHEEZING: 0
CHOKING: 0
NAUSEA: 0
ANAL BLEEDING: 0
RECTAL PAIN: 0
CONSTIPATION: 0
COUGH: 0
ABDOMINAL DISTENTION: 0
BLOOD IN STOOL: 0
DIARRHEA: 1
VOMITING: 0

## 2019-09-19 NOTE — PROGRESS NOTES
(LANTUS SOLOSTAR) 100 UNIT/ML injection pen, Inject 18 Units into the skin 2 times daily, Disp: 4 pen, Rfl: 5    acetaminophen (TYLENOL) 500 MG tablet, Take 500 mg by mouth every 6 hours as needed for Pain, Disp: , Rfl:     tacrolimus (PROGRAF) 1 MG capsule, Take 6 mg by mouth 2 times daily, Disp: , Rfl:     atorvastatin (LIPITOR) 40 MG tablet, TAKE 1 TABLET BY MOUTH DAILY, Disp: 30 tablet, Rfl: 11    blood glucose test strips (TRUETEST TEST) strip, 1 each by Does not apply route daily Pt testing bid, Disp: 100 each, Rfl: 3    glucose monitoring kit (FREESTYLE) monitoring kit, Pt testing bid.  True test glucometer, Disp: 1 kit, Rfl: 0    tamsulosin (FLOMAX) 0.4 MG capsule, TAKE 1 CAPSULE BY MOUTH DAILY, Disp: 30 capsule, Rfl: 11    magnesium oxide (MAG-OX) 400 MG tablet, Take 1 tablet by mouth 2 times daily, Disp: 60 tablet, Rfl: 11    Insulin Pen Needle (COMFORT EZ PEN NEEDLES) 32G X 6 MM MISC, USE AS DIRECTED WITH INSULIN PEN(S) UP TO 5 TIMES A DAY, Disp: 100 each, Rfl: 3    metoprolol succinate (TOPROL XL) 25 MG extended release tablet, TAKE 1 TABLET BY MOUTH ONCE DAILY, Disp: 30 tablet, Rfl: 11    ALLERGIES:   Allergies   Allergen Reactions    Lisinopril        FAMILY HISTORY:       Problem Relation Age of Onset    Cancer Mother     Cancer Father     Cancer Brother          SOCIAL HISTORY:   Social History     Socioeconomic History    Marital status:      Spouse name: Not on file    Number of children: Not on file    Years of education: Not on file    Highest education level: Not on file   Occupational History    Not on file   Social Needs    Financial resource strain: Not on file    Food insecurity:     Worry: Not on file     Inability: Not on file    Transportation needs:     Medical: Not on file     Non-medical: Not on file   Tobacco Use    Smoking status: Current Every Day Smoker     Packs/day: 0.50     Years: 50.00     Pack years: 25.00     Types: Cigarettes     Last attempt to MD  [facility]  O: #[phone]

## 2019-09-25 ENCOUNTER — OFFICE VISIT (OUTPATIENT)
Dept: FAMILY MEDICINE CLINIC | Age: 69
End: 2019-09-25
Payer: COMMERCIAL

## 2019-09-25 VITALS
OXYGEN SATURATION: 99 % | BODY MASS INDEX: 22.47 KG/M2 | HEART RATE: 71 BPM | SYSTOLIC BLOOD PRESSURE: 136 MMHG | WEIGHT: 175 LBS | DIASTOLIC BLOOD PRESSURE: 76 MMHG

## 2019-09-25 DIAGNOSIS — R10.9 ABDOMINAL PAIN, UNSPECIFIED ABDOMINAL LOCATION: Primary | ICD-10-CM

## 2019-09-25 DIAGNOSIS — K59.00 CONSTIPATION, UNSPECIFIED CONSTIPATION TYPE: ICD-10-CM

## 2019-09-25 PROCEDURE — 4040F PNEUMOC VAC/ADMIN/RCVD: CPT | Performed by: FAMILY MEDICINE

## 2019-09-25 PROCEDURE — 3017F COLORECTAL CA SCREEN DOC REV: CPT | Performed by: FAMILY MEDICINE

## 2019-09-25 PROCEDURE — 4004F PT TOBACCO SCREEN RCVD TLK: CPT | Performed by: FAMILY MEDICINE

## 2019-09-25 PROCEDURE — G8420 CALC BMI NORM PARAMETERS: HCPCS | Performed by: FAMILY MEDICINE

## 2019-09-25 PROCEDURE — G8599 NO ASA/ANTIPLAT THER USE RNG: HCPCS | Performed by: FAMILY MEDICINE

## 2019-09-25 PROCEDURE — G8427 DOCREV CUR MEDS BY ELIG CLIN: HCPCS | Performed by: FAMILY MEDICINE

## 2019-09-25 PROCEDURE — 99213 OFFICE O/P EST LOW 20 MIN: CPT | Performed by: FAMILY MEDICINE

## 2019-09-25 PROCEDURE — 1123F ACP DISCUSS/DSCN MKR DOCD: CPT | Performed by: FAMILY MEDICINE

## 2019-09-25 RX ORDER — ONDANSETRON 4 MG/1
TABLET, FILM COATED ORAL
Refills: 0 | COMMUNITY
Start: 2019-09-23 | End: 2019-12-23 | Stop reason: ALTCHOICE

## 2019-10-03 ENCOUNTER — TELEPHONE (OUTPATIENT)
Dept: FAMILY MEDICINE CLINIC | Age: 69
End: 2019-10-03

## 2019-10-03 DIAGNOSIS — R91.1 LUNG NODULE: Primary | ICD-10-CM

## 2019-10-03 DIAGNOSIS — K82.8 THICKENING OF WALL OF GALLBLADDER: ICD-10-CM

## 2019-10-03 DIAGNOSIS — R10.9 ABDOMINAL PAIN, UNSPECIFIED ABDOMINAL LOCATION: ICD-10-CM

## 2019-10-16 ENCOUNTER — HOSPITAL ENCOUNTER (OUTPATIENT)
Dept: NUCLEAR MEDICINE | Age: 69
Discharge: HOME | End: 2019-10-18
Payer: COMMERCIAL

## 2019-10-16 VITALS — WEIGHT: 175.04 LBS | BODY MASS INDEX: 22.47 KG/M2

## 2019-10-16 DIAGNOSIS — R10.9 ABDOMINAL PAIN, UNSPECIFIED ABDOMINAL LOCATION: ICD-10-CM

## 2019-10-16 DIAGNOSIS — K82.8 THICKENING OF WALL OF GALLBLADDER: ICD-10-CM

## 2019-10-16 PROCEDURE — A9537 TC99M MEBROFENIN: HCPCS | Performed by: FAMILY MEDICINE

## 2019-10-16 PROCEDURE — 3430000000 HC RX DIAGNOSTIC RADIOPHARMACEUTICAL: Performed by: FAMILY MEDICINE

## 2019-10-16 PROCEDURE — 6360000004 HC RX CONTRAST MEDICATION

## 2019-10-16 PROCEDURE — 6360000004 HC RX CONTRAST MEDICATION: Performed by: FAMILY MEDICINE

## 2019-10-16 PROCEDURE — 2580000003 HC RX 258

## 2019-10-16 PROCEDURE — 78227 HEPATOBIL SYST IMAGE W/DRUG: CPT

## 2019-10-16 RX ORDER — SODIUM CHLORIDE 9 MG/ML
INJECTION, SOLUTION INTRAVENOUS ONCE
Status: DISCONTINUED | OUTPATIENT
Start: 2019-10-16 | End: 2019-10-19 | Stop reason: HOSPADM

## 2019-10-16 RX ADMIN — SINCALIDE 1.59 MCG: 5 INJECTION, POWDER, LYOPHILIZED, FOR SOLUTION INTRAVENOUS at 09:35

## 2019-10-16 RX ADMIN — Medication 2.5 MILLICURIE: at 08:17

## 2019-10-17 ENCOUNTER — TELEPHONE (OUTPATIENT)
Dept: FAMILY MEDICINE CLINIC | Age: 69
End: 2019-10-17

## 2019-10-22 ENCOUNTER — TELEPHONE (OUTPATIENT)
Dept: FAMILY MEDICINE CLINIC | Age: 69
End: 2019-10-22

## 2019-10-23 ENCOUNTER — TELEPHONE (OUTPATIENT)
Dept: GASTROENTEROLOGY | Age: 69
End: 2019-10-23

## 2019-10-30 ENCOUNTER — HOSPITAL ENCOUNTER (OUTPATIENT)
Age: 69
Setting detail: OUTPATIENT SURGERY
Discharge: HOME | End: 2019-10-30
Attending: INTERNAL MEDICINE | Admitting: INTERNAL MEDICINE
Payer: COMMERCIAL

## 2019-10-30 ENCOUNTER — ANESTHESIA (OUTPATIENT)
Dept: ENDOSCOPY | Age: 69
End: 2019-10-30
Payer: COMMERCIAL

## 2019-10-30 ENCOUNTER — ANESTHESIA EVENT (OUTPATIENT)
Dept: ENDOSCOPY | Age: 69
End: 2019-10-30
Payer: COMMERCIAL

## 2019-10-30 VITALS
HEART RATE: 66 BPM | BODY MASS INDEX: 22.07 KG/M2 | DIASTOLIC BLOOD PRESSURE: 78 MMHG | TEMPERATURE: 98 F | HEIGHT: 74 IN | WEIGHT: 172 LBS | OXYGEN SATURATION: 98 % | RESPIRATION RATE: 16 BRPM | SYSTOLIC BLOOD PRESSURE: 168 MMHG

## 2019-10-30 VITALS — DIASTOLIC BLOOD PRESSURE: 78 MMHG | OXYGEN SATURATION: 100 % | SYSTOLIC BLOOD PRESSURE: 190 MMHG

## 2019-10-30 LAB
GLUCOSE BLD-MCNC: 77 MG/DL (ref 75–110)
GLUCOSE BLD-MCNC: 94 MG/DL (ref 75–110)

## 2019-10-30 PROCEDURE — 88305 TISSUE EXAM BY PATHOLOGIST: CPT

## 2019-10-30 PROCEDURE — 2709999900 HC NON-CHARGEABLE SUPPLY: Performed by: INTERNAL MEDICINE

## 2019-10-30 PROCEDURE — 6360000002 HC RX W HCPCS: Performed by: NURSE ANESTHETIST, CERTIFIED REGISTERED

## 2019-10-30 PROCEDURE — 82947 ASSAY GLUCOSE BLOOD QUANT: CPT

## 2019-10-30 PROCEDURE — 3700000001 HC ADD 15 MINUTES (ANESTHESIA): Performed by: INTERNAL MEDICINE

## 2019-10-30 PROCEDURE — 45380 COLONOSCOPY AND BIOPSY: CPT | Performed by: INTERNAL MEDICINE

## 2019-10-30 PROCEDURE — 6360000002 HC RX W HCPCS: Performed by: ANESTHESIOLOGY

## 2019-10-30 PROCEDURE — 3700000000 HC ANESTHESIA ATTENDED CARE: Performed by: INTERNAL MEDICINE

## 2019-10-30 PROCEDURE — 7100000001 HC PACU RECOVERY - ADDTL 15 MIN: Performed by: INTERNAL MEDICINE

## 2019-10-30 PROCEDURE — 2580000003 HC RX 258: Performed by: ANESTHESIOLOGY

## 2019-10-30 PROCEDURE — 7100000000 HC PACU RECOVERY - FIRST 15 MIN: Performed by: INTERNAL MEDICINE

## 2019-10-30 PROCEDURE — 7100000030 HC ASPR PHASE II RECOVERY - FIRST 15 MIN: Performed by: INTERNAL MEDICINE

## 2019-10-30 PROCEDURE — 3609010300 HC COLONOSCOPY W/BIOPSY SINGLE/MULTIPLE: Performed by: INTERNAL MEDICINE

## 2019-10-30 PROCEDURE — 2500000003 HC RX 250 WO HCPCS: Performed by: NURSE ANESTHETIST, CERTIFIED REGISTERED

## 2019-10-30 PROCEDURE — 7100000031 HC ASPR PHASE II RECOVERY - ADDTL 15 MIN: Performed by: INTERNAL MEDICINE

## 2019-10-30 RX ORDER — PROPOFOL 10 MG/ML
INJECTION, EMULSION INTRAVENOUS CONTINUOUS PRN
Status: DISCONTINUED | OUTPATIENT
Start: 2019-10-30 | End: 2019-10-30 | Stop reason: SDUPTHER

## 2019-10-30 RX ORDER — SODIUM CHLORIDE 9 MG/ML
INJECTION, SOLUTION INTRAVENOUS CONTINUOUS
Status: DISCONTINUED | OUTPATIENT
Start: 2019-10-30 | End: 2019-10-30 | Stop reason: HOSPADM

## 2019-10-30 RX ORDER — PROPOFOL 10 MG/ML
INJECTION, EMULSION INTRAVENOUS PRN
Status: DISCONTINUED | OUTPATIENT
Start: 2019-10-30 | End: 2019-10-30 | Stop reason: SDUPTHER

## 2019-10-30 RX ORDER — HYDRALAZINE HYDROCHLORIDE 20 MG/ML
5 INJECTION INTRAMUSCULAR; INTRAVENOUS ONCE
Status: COMPLETED | OUTPATIENT
Start: 2019-10-30 | End: 2019-10-30

## 2019-10-30 RX ORDER — LIDOCAINE HYDROCHLORIDE 10 MG/ML
INJECTION, SOLUTION EPIDURAL; INFILTRATION; INTRACAUDAL; PERINEURAL PRN
Status: DISCONTINUED | OUTPATIENT
Start: 2019-10-30 | End: 2019-10-30 | Stop reason: SDUPTHER

## 2019-10-30 RX ORDER — METOPROLOL TARTRATE 5 MG/5ML
INJECTION INTRAVENOUS PRN
Status: DISCONTINUED | OUTPATIENT
Start: 2019-10-30 | End: 2019-10-30 | Stop reason: SDUPTHER

## 2019-10-30 RX ADMIN — METOPROLOL TARTRATE 1 MG: 1 INJECTION, SOLUTION INTRAVENOUS at 10:50

## 2019-10-30 RX ADMIN — PROPOFOL 75 MCG/KG/MIN: 10 INJECTION, EMULSION INTRAVENOUS at 10:33

## 2019-10-30 RX ADMIN — SODIUM CHLORIDE: 9 INJECTION, SOLUTION INTRAVENOUS at 10:12

## 2019-10-30 RX ADMIN — LIDOCAINE HYDROCHLORIDE 20 MG: 10 INJECTION, SOLUTION EPIDURAL; INFILTRATION; INTRACAUDAL at 10:33

## 2019-10-30 RX ADMIN — PROPOFOL 30 MG: 10 INJECTION, EMULSION INTRAVENOUS at 10:33

## 2019-10-30 RX ADMIN — METOPROLOL TARTRATE 1.5 MG: 1 INJECTION, SOLUTION INTRAVENOUS at 10:46

## 2019-10-30 RX ADMIN — METOPROLOL TARTRATE 1 MG: 1 INJECTION, SOLUTION INTRAVENOUS at 10:43

## 2019-10-30 RX ADMIN — PROPOFOL 20 MG: 10 INJECTION, EMULSION INTRAVENOUS at 10:34

## 2019-10-30 RX ADMIN — HYDRALAZINE HYDROCHLORIDE 5 MG: 20 INJECTION, SOLUTION INTRAMUSCULAR; INTRAVENOUS at 11:40

## 2019-10-30 ASSESSMENT — PULMONARY FUNCTION TESTS
PIF_VALUE: 1
PIF_VALUE: 1
PIF_VALUE: 0
PIF_VALUE: 0
PIF_VALUE: 1
PIF_VALUE: 0
PIF_VALUE: 1
PIF_VALUE: 0
PIF_VALUE: 1
PIF_VALUE: 0
PIF_VALUE: 1
PIF_VALUE: 0
PIF_VALUE: 0
PIF_VALUE: 1

## 2019-10-30 ASSESSMENT — PAIN DESCRIPTION - DESCRIPTORS: DESCRIPTORS: ACHING

## 2019-10-30 ASSESSMENT — PAIN SCALES - GENERAL
PAINLEVEL_OUTOF10: 0

## 2019-10-30 ASSESSMENT — PAIN - FUNCTIONAL ASSESSMENT: PAIN_FUNCTIONAL_ASSESSMENT: 0-10

## 2019-10-30 ASSESSMENT — LIFESTYLE VARIABLES: SMOKING_STATUS: 1

## 2019-10-30 ASSESSMENT — ENCOUNTER SYMPTOMS: STRIDOR: 0

## 2019-10-31 LAB — SURGICAL PATHOLOGY REPORT: NORMAL

## 2019-11-05 ENCOUNTER — TELEPHONE (OUTPATIENT)
Dept: FAMILY MEDICINE CLINIC | Age: 69
End: 2019-11-05

## 2019-11-07 RX ORDER — METOPROLOL SUCCINATE 25 MG/1
TABLET, EXTENDED RELEASE ORAL
Qty: 30 TABLET | Refills: 11 | Status: SHIPPED | OUTPATIENT
Start: 2019-11-07 | End: 2019-12-23 | Stop reason: SDUPTHER

## 2019-11-10 ENCOUNTER — APPOINTMENT (OUTPATIENT)
Dept: CT IMAGING | Age: 69
End: 2019-11-10
Payer: COMMERCIAL

## 2019-11-10 ENCOUNTER — APPOINTMENT (OUTPATIENT)
Dept: GENERAL RADIOLOGY | Age: 69
End: 2019-11-10
Payer: COMMERCIAL

## 2019-11-10 ENCOUNTER — HOSPITAL ENCOUNTER (EMERGENCY)
Age: 69
Discharge: HOME | End: 2019-11-10
Attending: EMERGENCY MEDICINE
Payer: COMMERCIAL

## 2019-11-10 VITALS
HEART RATE: 83 BPM | DIASTOLIC BLOOD PRESSURE: 80 MMHG | OXYGEN SATURATION: 98 % | BODY MASS INDEX: 22.07 KG/M2 | RESPIRATION RATE: 18 BRPM | TEMPERATURE: 97.8 F | WEIGHT: 172 LBS | HEIGHT: 74 IN | SYSTOLIC BLOOD PRESSURE: 178 MMHG

## 2019-11-10 DIAGNOSIS — R10.31 RIGHT LOWER QUADRANT ABDOMINAL PAIN: Primary | ICD-10-CM

## 2019-11-10 LAB
-: ABNORMAL
ABSOLUTE EOS #: 0.07 K/UL (ref 0–0.44)
ABSOLUTE IMMATURE GRANULOCYTE: <0.03 K/UL (ref 0–0.3)
ABSOLUTE LYMPH #: 0.4 K/UL (ref 1.1–3.7)
ABSOLUTE MONO #: 0.35 K/UL (ref 0.1–1.2)
ALBUMIN SERPL-MCNC: 3.8 G/DL (ref 3.5–5.2)
ALBUMIN/GLOBULIN RATIO: 1.4 (ref 1–2.5)
ALP BLD-CCNC: 104 U/L (ref 40–129)
ALT SERPL-CCNC: 30 U/L (ref 5–41)
AMORPHOUS: ABNORMAL
ANION GAP SERPL CALCULATED.3IONS-SCNC: 7 MMOL/L (ref 9–17)
AST SERPL-CCNC: 24 U/L
BACTERIA: ABNORMAL
BASOPHILS # BLD: 0 % (ref 0–2)
BASOPHILS ABSOLUTE: <0.03 K/UL (ref 0–0.2)
BILIRUB SERPL-MCNC: 0.65 MG/DL (ref 0.3–1.2)
BILIRUBIN URINE: NEGATIVE
BUN BLDV-MCNC: 24 MG/DL (ref 8–23)
BUN/CREAT BLD: ABNORMAL (ref 9–20)
CALCIUM SERPL-MCNC: 8.8 MG/DL (ref 8.6–10.4)
CASTS UA: ABNORMAL /LPF (ref 0–8)
CHLORIDE BLD-SCNC: 102 MMOL/L (ref 98–107)
CO2: 22 MMOL/L (ref 20–31)
COLOR: YELLOW
CREAT SERPL-MCNC: 1.39 MG/DL (ref 0.7–1.2)
CRYSTALS, UA: ABNORMAL /HPF
DIFFERENTIAL TYPE: ABNORMAL
EOSINOPHILS RELATIVE PERCENT: 3 % (ref 1–4)
EPITHELIAL CELLS UA: ABNORMAL /HPF (ref 0–5)
GFR AFRICAN AMERICAN: >60 ML/MIN
GFR NON-AFRICAN AMERICAN: 51 ML/MIN
GFR SERPL CREATININE-BSD FRML MDRD: ABNORMAL ML/MIN/{1.73_M2}
GFR SERPL CREATININE-BSD FRML MDRD: ABNORMAL ML/MIN/{1.73_M2}
GLUCOSE BLD-MCNC: 393 MG/DL (ref 70–99)
GLUCOSE URINE: ABNORMAL
HCT VFR BLD CALC: 30 % (ref 40.7–50.3)
HEMOGLOBIN: 9.7 G/DL (ref 13–17)
IMMATURE GRANULOCYTES: 0 %
KETONES, URINE: NEGATIVE
LEUKOCYTE ESTERASE, URINE: NEGATIVE
LIPASE: 20 U/L (ref 13–60)
LYMPHOCYTES # BLD: 15 % (ref 24–43)
MCH RBC QN AUTO: 30.1 PG (ref 25.2–33.5)
MCHC RBC AUTO-ENTMCNC: 32.3 G/DL (ref 28.4–34.8)
MCV RBC AUTO: 93.2 FL (ref 82.6–102.9)
MONOCYTES # BLD: 13 % (ref 3–12)
MUCUS: ABNORMAL
NITRITE, URINE: NEGATIVE
NRBC AUTOMATED: 0 PER 100 WBC
OTHER OBSERVATIONS UA: ABNORMAL
PDW BLD-RTO: 13.2 % (ref 11.8–14.4)
PH UA: 7 (ref 5–8)
PLATELET # BLD: ABNORMAL K/UL (ref 138–453)
PLATELET ESTIMATE: ABNORMAL
PLATELET, FLUORESCENCE: 90 K/UL (ref 138–453)
PLATELET, IMMATURE FRACTION: 5.1 % (ref 1.1–10.3)
PMV BLD AUTO: ABNORMAL FL (ref 8.1–13.5)
POTASSIUM SERPL-SCNC: 4.8 MMOL/L (ref 3.7–5.3)
PROTEIN UA: ABNORMAL
RBC # BLD: 3.22 M/UL (ref 4.21–5.77)
RBC # BLD: ABNORMAL 10*6/UL
RBC UA: ABNORMAL /HPF (ref 0–4)
RENAL EPITHELIAL, UA: ABNORMAL /HPF
SEG NEUTROPHILS: 68 % (ref 36–65)
SEGMENTED NEUTROPHILS ABSOLUTE COUNT: 1.78 K/UL (ref 1.5–8.1)
SODIUM BLD-SCNC: 131 MMOL/L (ref 135–144)
SPECIFIC GRAVITY UA: 1.01 (ref 1–1.03)
TOTAL PROTEIN: 6.5 G/DL (ref 6.4–8.3)
TRICHOMONAS: ABNORMAL
TROPONIN INTERP: NORMAL
TROPONIN INTERP: NORMAL
TROPONIN T: NORMAL NG/ML
TROPONIN T: NORMAL NG/ML
TROPONIN, HIGH SENSITIVITY: 14 NG/L (ref 0–22)
TROPONIN, HIGH SENSITIVITY: 21 NG/L (ref 0–22)
TURBIDITY: CLEAR
URINE HGB: NEGATIVE
UROBILINOGEN, URINE: NORMAL
WBC # BLD: 2.6 K/UL (ref 3.5–11.3)
WBC # BLD: ABNORMAL 10*3/UL
WBC UA: ABNORMAL /HPF (ref 0–5)
YEAST: ABNORMAL

## 2019-11-10 PROCEDURE — 96376 TX/PRO/DX INJ SAME DRUG ADON: CPT

## 2019-11-10 PROCEDURE — 71046 X-RAY EXAM CHEST 2 VIEWS: CPT

## 2019-11-10 PROCEDURE — 84484 ASSAY OF TROPONIN QUANT: CPT

## 2019-11-10 PROCEDURE — 6360000002 HC RX W HCPCS: Performed by: STUDENT IN AN ORGANIZED HEALTH CARE EDUCATION/TRAINING PROGRAM

## 2019-11-10 PROCEDURE — 2580000003 HC RX 258: Performed by: STUDENT IN AN ORGANIZED HEALTH CARE EDUCATION/TRAINING PROGRAM

## 2019-11-10 PROCEDURE — 80053 COMPREHEN METABOLIC PANEL: CPT

## 2019-11-10 PROCEDURE — 99284 EMERGENCY DEPT VISIT MOD MDM: CPT

## 2019-11-10 PROCEDURE — 81001 URINALYSIS AUTO W/SCOPE: CPT

## 2019-11-10 PROCEDURE — 85025 COMPLETE CBC W/AUTO DIFF WBC: CPT

## 2019-11-10 PROCEDURE — 85055 RETICULATED PLATELET ASSAY: CPT

## 2019-11-10 PROCEDURE — 83690 ASSAY OF LIPASE: CPT

## 2019-11-10 PROCEDURE — 93005 ELECTROCARDIOGRAM TRACING: CPT | Performed by: STUDENT IN AN ORGANIZED HEALTH CARE EDUCATION/TRAINING PROGRAM

## 2019-11-10 PROCEDURE — 74176 CT ABD & PELVIS W/O CONTRAST: CPT

## 2019-11-10 PROCEDURE — 87086 URINE CULTURE/COLONY COUNT: CPT

## 2019-11-10 PROCEDURE — 96374 THER/PROPH/DIAG INJ IV PUSH: CPT

## 2019-11-10 RX ORDER — MORPHINE SULFATE 4 MG/ML
4 INJECTION, SOLUTION INTRAMUSCULAR; INTRAVENOUS ONCE
Status: COMPLETED | OUTPATIENT
Start: 2019-11-10 | End: 2019-11-10

## 2019-11-10 RX ORDER — DICYCLOMINE HYDROCHLORIDE 10 MG/1
10 CAPSULE ORAL EVERY 6 HOURS PRN
Qty: 12 CAPSULE | Refills: 0 | Status: SHIPPED | OUTPATIENT
Start: 2019-11-10 | End: 2019-12-23

## 2019-11-10 RX ORDER — BLOOD-GLUCOSE METER
KIT MISCELLANEOUS
Qty: 100 EACH | Refills: 3 | Status: SHIPPED | OUTPATIENT
Start: 2019-11-10 | End: 2020-04-14 | Stop reason: SDUPTHER

## 2019-11-10 RX ORDER — 0.9 % SODIUM CHLORIDE 0.9 %
500 INTRAVENOUS SOLUTION INTRAVENOUS ONCE
Status: COMPLETED | OUTPATIENT
Start: 2019-11-10 | End: 2019-11-10

## 2019-11-10 RX ORDER — ONDANSETRON 4 MG/1
4 TABLET, ORALLY DISINTEGRATING ORAL EVERY 8 HOURS PRN
Qty: 6 TABLET | Refills: 0 | Status: SHIPPED | OUTPATIENT
Start: 2019-11-10 | End: 2019-11-12

## 2019-11-10 RX ADMIN — SODIUM CHLORIDE 500 ML: 9 INJECTION, SOLUTION INTRAVENOUS at 12:01

## 2019-11-10 RX ADMIN — MORPHINE SULFATE 4 MG: 4 INJECTION INTRAVENOUS at 12:01

## 2019-11-10 RX ADMIN — MORPHINE SULFATE 4 MG: 4 INJECTION INTRAVENOUS at 14:51

## 2019-11-10 ASSESSMENT — PAIN DESCRIPTION - ONSET: ONSET: GRADUAL

## 2019-11-10 ASSESSMENT — ENCOUNTER SYMPTOMS
NAUSEA: 0
SHORTNESS OF BREATH: 1
CONSTIPATION: 0
DIARRHEA: 1
BLOOD IN STOOL: 0
COUGH: 0
ABDOMINAL PAIN: 1
VOMITING: 0
SORE THROAT: 0
BACK PAIN: 0

## 2019-11-10 ASSESSMENT — PAIN DESCRIPTION - LOCATION
LOCATION: ABDOMEN
LOCATION: ABDOMEN

## 2019-11-10 ASSESSMENT — PAIN DESCRIPTION - ORIENTATION: ORIENTATION: MID

## 2019-11-10 ASSESSMENT — PAIN SCALES - GENERAL
PAINLEVEL_OUTOF10: 9
PAINLEVEL_OUTOF10: 5
PAINLEVEL_OUTOF10: 9
PAINLEVEL_OUTOF10: 2

## 2019-11-10 ASSESSMENT — PAIN DESCRIPTION - PAIN TYPE
TYPE: ACUTE PAIN
TYPE: ACUTE PAIN

## 2019-11-10 ASSESSMENT — PAIN DESCRIPTION - DESCRIPTORS: DESCRIPTORS: ACHING;DULL

## 2019-11-10 ASSESSMENT — PAIN DESCRIPTION - FREQUENCY: FREQUENCY: CONTINUOUS

## 2019-11-10 ASSESSMENT — PAIN DESCRIPTION - PROGRESSION: CLINICAL_PROGRESSION: NOT CHANGED

## 2019-11-11 ENCOUNTER — TELEPHONE (OUTPATIENT)
Dept: FAMILY MEDICINE CLINIC | Age: 69
End: 2019-11-11

## 2019-11-11 LAB
CULTURE: NO GROWTH
EKG ATRIAL RATE: 79 BPM
EKG P AXIS: 63 DEGREES
EKG P-R INTERVAL: 180 MS
EKG Q-T INTERVAL: 402 MS
EKG QRS DURATION: 90 MS
EKG QTC CALCULATION (BAZETT): 460 MS
EKG R AXIS: 17 DEGREES
EKG T AXIS: 40 DEGREES
EKG VENTRICULAR RATE: 79 BPM
Lab: NORMAL
SPECIMEN DESCRIPTION: NORMAL

## 2019-11-11 PROCEDURE — 93010 ELECTROCARDIOGRAM REPORT: CPT | Performed by: INTERNAL MEDICINE

## 2019-11-22 ENCOUNTER — OFFICE VISIT (OUTPATIENT)
Dept: FAMILY MEDICINE CLINIC | Age: 69
End: 2019-11-22
Payer: COMMERCIAL

## 2019-11-22 VITALS — DIASTOLIC BLOOD PRESSURE: 72 MMHG | SYSTOLIC BLOOD PRESSURE: 122 MMHG | WEIGHT: 176 LBS | BODY MASS INDEX: 22.6 KG/M2

## 2019-11-22 DIAGNOSIS — E11.65 TYPE 2 DIABETES MELLITUS WITH HYPERGLYCEMIA, WITH LONG-TERM CURRENT USE OF INSULIN (HCC): Primary | ICD-10-CM

## 2019-11-22 DIAGNOSIS — Z13.89 MULTIPHASIC SCREENING: ICD-10-CM

## 2019-11-22 DIAGNOSIS — R10.84 GENERALIZED ABDOMINAL CRAMPING: ICD-10-CM

## 2019-11-22 DIAGNOSIS — R91.1 PULMONARY NODULE: ICD-10-CM

## 2019-11-22 DIAGNOSIS — Z87.891 PERSONAL HISTORY OF TOBACCO USE, PRESENTING HAZARDS TO HEALTH: ICD-10-CM

## 2019-11-22 DIAGNOSIS — E78.5 DYSLIPIDEMIA: ICD-10-CM

## 2019-11-22 DIAGNOSIS — Z79.4 TYPE 2 DIABETES MELLITUS WITH HYPERGLYCEMIA, WITH LONG-TERM CURRENT USE OF INSULIN (HCC): Primary | ICD-10-CM

## 2019-11-22 DIAGNOSIS — I10 HTN, GOAL BELOW 130/80: ICD-10-CM

## 2019-11-22 DIAGNOSIS — Z23 NEED FOR VACCINATION: ICD-10-CM

## 2019-11-22 PROCEDURE — 3017F COLORECTAL CA SCREEN DOC REV: CPT | Performed by: FAMILY MEDICINE

## 2019-11-22 PROCEDURE — G8427 DOCREV CUR MEDS BY ELIG CLIN: HCPCS | Performed by: FAMILY MEDICINE

## 2019-11-22 PROCEDURE — 90653 IIV ADJUVANT VACCINE IM: CPT | Performed by: FAMILY MEDICINE

## 2019-11-22 PROCEDURE — G8420 CALC BMI NORM PARAMETERS: HCPCS | Performed by: FAMILY MEDICINE

## 2019-11-22 PROCEDURE — 2022F DILAT RTA XM EVC RTNOPTHY: CPT | Performed by: FAMILY MEDICINE

## 2019-11-22 PROCEDURE — G8599 NO ASA/ANTIPLAT THER USE RNG: HCPCS | Performed by: FAMILY MEDICINE

## 2019-11-22 PROCEDURE — G0008 ADMIN INFLUENZA VIRUS VAC: HCPCS | Performed by: FAMILY MEDICINE

## 2019-11-22 PROCEDURE — 4004F PT TOBACCO SCREEN RCVD TLK: CPT | Performed by: FAMILY MEDICINE

## 2019-11-22 PROCEDURE — 99214 OFFICE O/P EST MOD 30 MIN: CPT | Performed by: FAMILY MEDICINE

## 2019-11-22 PROCEDURE — 3046F HEMOGLOBIN A1C LEVEL >9.0%: CPT | Performed by: FAMILY MEDICINE

## 2019-11-22 PROCEDURE — G8482 FLU IMMUNIZE ORDER/ADMIN: HCPCS | Performed by: FAMILY MEDICINE

## 2019-11-22 PROCEDURE — 4040F PNEUMOC VAC/ADMIN/RCVD: CPT | Performed by: FAMILY MEDICINE

## 2019-11-22 PROCEDURE — 1123F ACP DISCUSS/DSCN MKR DOCD: CPT | Performed by: FAMILY MEDICINE

## 2019-11-22 RX ORDER — AMOXICILLIN 875 MG/1
875 TABLET, COATED ORAL 2 TIMES DAILY
COMMUNITY
Start: 2019-11-20 | End: 2019-11-27

## 2019-11-22 RX ORDER — EVEROLIMUS TABLETS 0.25 MG/1
0.25 TABLET ORAL 2 TIMES DAILY
COMMUNITY
End: 2019-12-23 | Stop reason: ALTCHOICE

## 2019-11-22 RX ORDER — ACETAMINOPHEN 500 MG
500 TABLET ORAL EVERY 6 HOURS PRN
COMMUNITY
End: 2020-01-30 | Stop reason: ALTCHOICE

## 2019-11-22 RX ORDER — EVEROLIMUS TABLETS 0.75 MG/1
0.75 TABLET ORAL 2 TIMES DAILY
COMMUNITY
End: 2019-12-23 | Stop reason: ALTCHOICE

## 2019-11-22 RX ORDER — AZITHROMYCIN 250 MG/1
250 TABLET, FILM COATED ORAL DAILY
COMMUNITY
Start: 2019-11-20 | End: 2019-11-25

## 2019-11-22 RX ORDER — GABAPENTIN 100 MG/1
100 CAPSULE ORAL 2 TIMES DAILY
COMMUNITY
End: 2019-12-23 | Stop reason: SDUPTHER

## 2019-11-22 ASSESSMENT — PATIENT HEALTH QUESTIONNAIRE - PHQ9
2. FEELING DOWN, DEPRESSED OR HOPELESS: 0
1. LITTLE INTEREST OR PLEASURE IN DOING THINGS: 0
SUM OF ALL RESPONSES TO PHQ9 QUESTIONS 1 & 2: 0
SUM OF ALL RESPONSES TO PHQ QUESTIONS 1-9: 0
SUM OF ALL RESPONSES TO PHQ QUESTIONS 1-9: 0

## 2019-12-03 ENCOUNTER — TELEPHONE (OUTPATIENT)
Dept: FAMILY MEDICINE CLINIC | Age: 69
End: 2019-12-03

## 2019-12-04 ENCOUNTER — HOSPITAL ENCOUNTER (OUTPATIENT)
Dept: CT IMAGING | Age: 69
Discharge: HOME | End: 2019-12-06
Payer: COMMERCIAL

## 2019-12-04 DIAGNOSIS — R91.1 PULMONARY NODULE: ICD-10-CM

## 2019-12-04 PROCEDURE — 71250 CT THORAX DX C-: CPT

## 2019-12-16 LAB
ALBUMIN SERPL-MCNC: NORMAL G/DL
AVERAGE GLUCOSE: NORMAL
BUN / CREAT RATIO: NORMAL
BUN BLDV-MCNC: NORMAL MG/DL
CALCIUM SERPL-MCNC: NORMAL MG/DL
CHLORIDE BLD-SCNC: NORMAL MMOL/L
CHOLESTEROL, TOTAL: NORMAL
CHOLESTEROL/HDL RATIO: NORMAL
CO2: NORMAL
CREAT SERPL-MCNC: NORMAL MG/DL
GLUCOSE: NORMAL
HBA1C MFR BLD: NORMAL %
HDLC SERPL-MCNC: NORMAL MG/DL
LDL CHOLESTEROL CALCULATED: NORMAL
PHOSPHORUS: NORMAL
POTASSIUM SERPL-SCNC: NORMAL MMOL/L
SODIUM BLD-SCNC: NORMAL MMOL/L
TRIGL SERPL-MCNC: NORMAL MG/DL
VLDLC SERPL CALC-MCNC: NORMAL MG/DL

## 2019-12-23 ENCOUNTER — OFFICE VISIT (OUTPATIENT)
Dept: FAMILY MEDICINE CLINIC | Age: 69
End: 2019-12-23
Payer: COMMERCIAL

## 2019-12-23 VITALS
OXYGEN SATURATION: 99 % | BODY MASS INDEX: 22.21 KG/M2 | WEIGHT: 173 LBS | HEART RATE: 82 BPM | SYSTOLIC BLOOD PRESSURE: 138 MMHG | DIASTOLIC BLOOD PRESSURE: 82 MMHG

## 2019-12-23 DIAGNOSIS — I10 HTN, GOAL BELOW 130/80: ICD-10-CM

## 2019-12-23 DIAGNOSIS — E11.65 TYPE 2 DIABETES MELLITUS WITH HYPERGLYCEMIA, WITH LONG-TERM CURRENT USE OF INSULIN (HCC): ICD-10-CM

## 2019-12-23 DIAGNOSIS — E78.5 DYSLIPIDEMIA: ICD-10-CM

## 2019-12-23 DIAGNOSIS — Z79.4 TYPE 2 DIABETES MELLITUS WITH HYPERGLYCEMIA, WITH LONG-TERM CURRENT USE OF INSULIN (HCC): ICD-10-CM

## 2019-12-23 DIAGNOSIS — Z87.891 PERSONAL HISTORY OF TOBACCO USE, PRESENTING HAZARDS TO HEALTH: ICD-10-CM

## 2019-12-23 DIAGNOSIS — Z76.0 MEDICATION REFILL: Primary | ICD-10-CM

## 2019-12-23 PROCEDURE — 99214 OFFICE O/P EST MOD 30 MIN: CPT | Performed by: FAMILY MEDICINE

## 2019-12-23 PROCEDURE — G8482 FLU IMMUNIZE ORDER/ADMIN: HCPCS | Performed by: FAMILY MEDICINE

## 2019-12-23 PROCEDURE — G8599 NO ASA/ANTIPLAT THER USE RNG: HCPCS | Performed by: FAMILY MEDICINE

## 2019-12-23 PROCEDURE — 1123F ACP DISCUSS/DSCN MKR DOCD: CPT | Performed by: FAMILY MEDICINE

## 2019-12-23 PROCEDURE — 2022F DILAT RTA XM EVC RTNOPTHY: CPT | Performed by: FAMILY MEDICINE

## 2019-12-23 PROCEDURE — G8420 CALC BMI NORM PARAMETERS: HCPCS | Performed by: FAMILY MEDICINE

## 2019-12-23 PROCEDURE — 4004F PT TOBACCO SCREEN RCVD TLK: CPT | Performed by: FAMILY MEDICINE

## 2019-12-23 PROCEDURE — 4040F PNEUMOC VAC/ADMIN/RCVD: CPT | Performed by: FAMILY MEDICINE

## 2019-12-23 PROCEDURE — 3046F HEMOGLOBIN A1C LEVEL >9.0%: CPT | Performed by: FAMILY MEDICINE

## 2019-12-23 PROCEDURE — 3017F COLORECTAL CA SCREEN DOC REV: CPT | Performed by: FAMILY MEDICINE

## 2019-12-23 PROCEDURE — G8427 DOCREV CUR MEDS BY ELIG CLIN: HCPCS | Performed by: FAMILY MEDICINE

## 2019-12-23 RX ORDER — METOPROLOL SUCCINATE 25 MG/1
TABLET, EXTENDED RELEASE ORAL
Qty: 90 TABLET | Refills: 0 | Status: SHIPPED | OUTPATIENT
Start: 2019-12-23 | End: 2020-04-06

## 2019-12-23 RX ORDER — TAMSULOSIN HYDROCHLORIDE 0.4 MG/1
0.4 CAPSULE ORAL DAILY
Qty: 90 CAPSULE | Refills: 0 | Status: SHIPPED | OUTPATIENT
Start: 2019-12-23 | End: 2020-04-14 | Stop reason: SDUPTHER

## 2019-12-23 RX ORDER — ATORVASTATIN CALCIUM 40 MG/1
TABLET, FILM COATED ORAL
Qty: 90 TABLET | Refills: 0 | Status: SHIPPED | OUTPATIENT
Start: 2019-12-23 | End: 2020-04-10

## 2019-12-23 RX ORDER — MAGNESIUM OXIDE 400 MG/1
400 TABLET ORAL 2 TIMES DAILY
Qty: 180 TABLET | Refills: 0 | Status: SHIPPED | OUTPATIENT
Start: 2019-12-23 | End: 2020-04-10

## 2019-12-23 RX ORDER — GABAPENTIN 100 MG/1
100 CAPSULE ORAL 2 TIMES DAILY
Qty: 180 CAPSULE | Refills: 0 | Status: SHIPPED | OUTPATIENT
Start: 2019-12-23 | End: 2020-04-14 | Stop reason: SDUPTHER

## 2019-12-23 SDOH — ECONOMIC STABILITY: TRANSPORTATION INSECURITY
IN THE PAST 12 MONTHS, HAS LACK OF TRANSPORTATION KEPT YOU FROM MEETINGS, WORK, OR FROM GETTING THINGS NEEDED FOR DAILY LIVING?: YES

## 2019-12-23 SDOH — ECONOMIC STABILITY: FOOD INSECURITY: WITHIN THE PAST 12 MONTHS, YOU WORRIED THAT YOUR FOOD WOULD RUN OUT BEFORE YOU GOT MONEY TO BUY MORE.: OFTEN TRUE

## 2019-12-23 SDOH — ECONOMIC STABILITY: INCOME INSECURITY: HOW HARD IS IT FOR YOU TO PAY FOR THE VERY BASICS LIKE FOOD, HOUSING, MEDICAL CARE, AND HEATING?: SOMEWHAT HARD

## 2019-12-23 SDOH — ECONOMIC STABILITY: FOOD INSECURITY: WITHIN THE PAST 12 MONTHS, THE FOOD YOU BOUGHT JUST DIDN'T LAST AND YOU DIDN'T HAVE MONEY TO GET MORE.: OFTEN TRUE

## 2019-12-23 SDOH — ECONOMIC STABILITY: TRANSPORTATION INSECURITY
IN THE PAST 12 MONTHS, HAS THE LACK OF TRANSPORTATION KEPT YOU FROM MEDICAL APPOINTMENTS OR FROM GETTING MEDICATIONS?: YES

## 2020-01-29 ENCOUNTER — TELEPHONE (OUTPATIENT)
Dept: FAMILY MEDICINE CLINIC | Age: 70
End: 2020-01-29

## 2020-01-29 NOTE — TELEPHONE ENCOUNTER
Please offer him an available appointment today. If nothing available please place him on my schedule for tomorrow.     Radha Camejo DO

## 2020-01-30 ENCOUNTER — OFFICE VISIT (OUTPATIENT)
Dept: FAMILY MEDICINE CLINIC | Age: 70
End: 2020-01-30
Payer: COMMERCIAL

## 2020-01-30 VITALS
OXYGEN SATURATION: 99 % | HEART RATE: 80 BPM | DIASTOLIC BLOOD PRESSURE: 100 MMHG | WEIGHT: 175 LBS | SYSTOLIC BLOOD PRESSURE: 160 MMHG | BODY MASS INDEX: 22.47 KG/M2

## 2020-01-30 PROCEDURE — 93000 ELECTROCARDIOGRAM COMPLETE: CPT | Performed by: FAMILY MEDICINE

## 2020-01-30 PROCEDURE — 3046F HEMOGLOBIN A1C LEVEL >9.0%: CPT | Performed by: FAMILY MEDICINE

## 2020-01-30 PROCEDURE — 4040F PNEUMOC VAC/ADMIN/RCVD: CPT | Performed by: FAMILY MEDICINE

## 2020-01-30 PROCEDURE — 2022F DILAT RTA XM EVC RTNOPTHY: CPT | Performed by: FAMILY MEDICINE

## 2020-01-30 PROCEDURE — G8427 DOCREV CUR MEDS BY ELIG CLIN: HCPCS | Performed by: FAMILY MEDICINE

## 2020-01-30 PROCEDURE — 1123F ACP DISCUSS/DSCN MKR DOCD: CPT | Performed by: FAMILY MEDICINE

## 2020-01-30 PROCEDURE — 99214 OFFICE O/P EST MOD 30 MIN: CPT | Performed by: FAMILY MEDICINE

## 2020-01-30 PROCEDURE — G8482 FLU IMMUNIZE ORDER/ADMIN: HCPCS | Performed by: FAMILY MEDICINE

## 2020-01-30 PROCEDURE — 3017F COLORECTAL CA SCREEN DOC REV: CPT | Performed by: FAMILY MEDICINE

## 2020-01-30 PROCEDURE — G8420 CALC BMI NORM PARAMETERS: HCPCS | Performed by: FAMILY MEDICINE

## 2020-01-30 PROCEDURE — 4004F PT TOBACCO SCREEN RCVD TLK: CPT | Performed by: FAMILY MEDICINE

## 2020-01-30 RX ORDER — NIFEDIPINE 30 MG/1
30 TABLET, EXTENDED RELEASE ORAL DAILY
Qty: 30 TABLET | Refills: 3 | Status: SHIPPED | OUTPATIENT
Start: 2020-01-30 | End: 2020-03-13

## 2020-01-30 RX ORDER — INSULIN LISPRO 100 [IU]/ML
INJECTION, SOLUTION INTRAVENOUS; SUBCUTANEOUS
Status: ON HOLD | COMMUNITY
Start: 2020-01-13 | End: 2020-03-23

## 2020-01-30 ASSESSMENT — PATIENT HEALTH QUESTIONNAIRE - PHQ9
SUM OF ALL RESPONSES TO PHQ9 QUESTIONS 1 & 2: 0
SUM OF ALL RESPONSES TO PHQ QUESTIONS 1-9: 0
SUM OF ALL RESPONSES TO PHQ QUESTIONS 1-9: 0
2. FEELING DOWN, DEPRESSED OR HOPELESS: 0
1. LITTLE INTEREST OR PLEASURE IN DOING THINGS: 0

## 2020-01-30 NOTE — PATIENT INSTRUCTIONS
to keep trying. Most people are not successful the first few times they try to quit. Do not get mad at yourself if you smoke again. Make a list of things you learned and think about when you want to try again, such as next week, next month, or next year. Where can you learn more? Go to [email or web address] and sign in to your [employer] account. Enter C288 in the teextee box to learn more about \"Stopping Smoking: Care Instructions. \"     If you do not have an account, please click on the \"Sign Up Now\" link. Current as of: July 4, 2019  Content Version: 12.3  © 7074-0449 Healthwise, [employer]. Care instructions adapted under license by [facility] ([facility]). If you have questions about a medical condition or this instruction, always ask your healthcare professional. [place] [address] any warranty or liability for your use of this information. Learning About Benefits From Quitting Smoking  How does quitting smoking make you healthier? If you're thinking about quitting smoking, you may have a few reasons to be smoke-free. Your health may be one of them. · When you quit smoking, you lower your risks for cancer, lung disease, heart attack, stroke, blood vessel disease, and blindness from macular degeneration. · When you're smoke-free, you get sick less often, and you heal faster. You are less likely to get colds, flu, bronchitis, and pneumonia. · As a nonsmoker, you may find that your mood is better and you are less stressed. When and how will you feel healthier? Quitting has real health benefits that start from day 1 of being smoke-free. And the longer you stay smoke-free, the healthier you get and the better you feel. The first hours  · After just 20 minutes, your blood pressure and heart rate go down. That means there's less stress on your heart and blood vessels.   · Within 12 hours, the level of carbon monoxide in your blood drops back to normal. That

## 2020-01-30 NOTE — PROGRESS NOTES
gabapentin (NEURONTIN) 100 MG capsule Take 1 capsule by mouth 2 times daily for 90 days. 180 capsule 0    Dulaglutide (TRULICITY) 5.85 OV/8.4OM SOPN Inject 0.75 mg into the skin once a week 4 pen 2    FREESTYLE LITE strip USE TO TEST TWICE A DAY AS DIRECTED 100 each 3    insulin lispro (HUMALOG KWIKPEN) 100 UNIT/ML pen Per Sliding scale. Max 28 units per day 3 pen 5    Insulin Pen Needle (ULTICARE MINI PEN NEEDLES) 31G X 6 MM MISC PATIENT IS TESTING UP TO 5 TIMES DAILY 100 each 3    insulin glargine (LANTUS SOLOSTAR) 100 UNIT/ML injection pen Inject 18 Units into the skin 2 times daily 4 pen 5    tacrolimus (PROGRAF) 1 MG capsule Take 6 mg by mouth 2 times daily      glucose monitoring kit (FREESTYLE) monitoring kit Pt testing bid. True test glucometer 1 kit 0    Insulin Pen Needle (COMFORT EZ PEN NEEDLES) 32G X 6 MM MISC USE AS DIRECTED WITH INSULIN PEN(S) UP TO 5 TIMES A  each 3     No current facility-administered medications for this visit. Allergies:      Allergies   Allergen Reactions    Amino Acids     Lisinopril         Medical History:     Past Medical History:   Diagnosis Date    Diabetes mellitus ([address]     Hemod[facility] patient [facility])     patient had Kidney transplant    Hepatitis C     Hepatitis C without hepatic coma     Hyperlipidemia     Hypertension        Past Surgical History:   Procedure Laterality Date    COLONOSCOPY N/A 10/30/2019    COLONOSCOPY WITH BIOPSY performed by Eunice Kitchen MD at [address] [place] [address] 11/2015    at [facility] [address] [address]  2017       Family History   Problem Relation Age of Onset    Cancer Mother     Cancer Father     Cancer Brother         Social History:     Social History     Socioeconomic History    Marital status:      Spouse name: Not on file    Number of children: Not on file    Years of education: Not on file    Highest education level: Not on file Occupational History    Not on file   Social Needs    Financial resource strain: Somewhat hard    Food insecurity:     Worry: Often true     Inability: Often true    Transportation needs:     Medical: Yes     Non-medical: Yes   Tobacco Use    Smoking status: Current Every Day Smoker     Packs/day: 0.50     Years: 50.00     Pack years: 25.00     Types: Cigarettes     Last attempt to quit: 2013     Years since quittin.8    Smokeless tobacco: Never Used   Substance and Sexual Activity    Alcohol use: No    Drug use: No     Types: Other-see comments     Comment: used marijuana and crack cocaine previously - last use over one year ago    Sexual activity: Never   Lifestyle    Physical activity:     Days per week: Not on file     Minutes per session: Not on file    Stress: Not on file   Relationships    Social connections:     Talks on phone: Not on file     Gets together: Not on file     Attends Confucianism service: Not on file     Active member of club or organization: Not on file     Attends meetings of clubs or organizations: Not on file     Relationship status: Not on file    Intimate partner violence:     Fear of current or ex partner: Not on file     Emotionally abused: Not on file     Physically abused: Not on file     Forced sexual activity: Not on file   Other Topics Concern    Not on file   Social History Narrative    Not on file        ROS:     Constitutional: No fevers, chills, fatigue. ENT: No nasal congestion or sore throat  Respiratory: No difficulty in breathing or cough. Cardiovascular: No chest pain, palpitations or shortness of breath  Gastrointestinal: No abdominal pain or change in bowel movements. Genitourinary: No change in urinary frequency or dysuria. Skin: No rashes or skin lesions. Neurological: No weakness. No headaches.          Last Filed Vitals:  BP (!) 160/100   Pulse 80   Wt 175 lb (79.4 kg)   SpO2 99%   BMI 22.47 kg/m²      Physical Examination:     GENERAL Segs Absolute 11/10/2019 1.78  1.50 - 8.10 k/uL Final    Absolute Lymph # 11/10/2019 0.40* 1.10 - 3.70 k/uL Final    Absolute Mono # 11/10/2019 0.35  0.10 - 1.20 k/uL Final    Absolute Eos # 11/10/2019 0.07  0.00 - 0.44 k/uL Final    Basophils Absolute 11/10/2019 <0.03  0.00 - 0.20 k/uL Final    Absolute Immature Granulocyte 11/10/2019 <0.03  0.00 - 0.30 k/uL Final    Glucose 11/10/2019 393* 70 - 99 mg/dL Final    BUN 11/10/2019 24* 8 - 23 mg/dL Final    CREATININE 11/10/2019 1.39* 0.70 - 1.20 mg/dL Final    Bun/Cre Ratio 11/10/2019 NOT REPORTED  9 - 20 Final    Calcium 11/10/2019 8.8  8.6 - 10.4 mg/dL Final    Sodium 11/10/2019 131* 135 - 144 mmol/L Final    Potassium 11/10/2019 4.8  3.7 - 5.3 mmol/L Final    Chloride 11/10/2019 102  98 - 107 mmol/L Final    CO2 11/10/2019 22  20 - 31 mmol/L Final    Anion Gap 11/10/2019 7* 9 - 17 mmol/L Final    Alkaline Phosphatase 11/10/2019 104  40 - 129 U/L Final    ALT 11/10/2019 30  5 - 41 U/L Final    AST 11/10/2019 24  <40 U/L Final    Total Bilirubin 11/10/2019 0.65  0.3 - 1.2 mg/dL Final    Total Protein 11/10/2019 6.5  6.4 - 8.3 g/dL Final    Alb 11/10/2019 3.8  3.5 - 5.2 g/dL Final    Albumin/Globulin Ratio 11/10/2019 1.4  1.0 - 2.5 Final    GFR Non- 11/10/2019 51* >60 mL/min Final    GFR  11/10/2019 >60  >60 mL/min Final    GFR Comment 11/10/2019        Final    Comment: Average GFR for 61-76 years old:   80 mL/min/1.73sq m  Chronic Kidney Disease:   <60 mL/min/1.73sq m  Kidney failure:   <15 mL/min/1.73sq m              eGFR calculated using average adult body mass.  Additional eGFR calculator available at:        [email or web address]            GFR Staging 11/10/2019 NOT REPORTED   Final    Lipase 11/10/2019 20  13 - 60 U/L Final    Ventricular Rate 11/10/2019 79  BPM Final    Atrial Rate 11/10/2019 79  BPM Final    P-R Interval 11/10/2019 180  ms Final    QRS Duration 11/10/2019 90  ms Final    Q-T Interval 11/10/2019 402  ms Final    QTc Calculation (Bazett) 11/10/2019 460  ms Final    P Axis 11/10/2019 63  degrees Final    R Axis 11/10/2019 17  degrees Final    T Axis 11/10/2019 40  degrees Final    Color, UA 11/10/2019 YELLOW  YELLOW Final    Turbidity UA 11/10/2019 CLEAR  CLEAR Final    Glucose, Ur 11/10/2019 3+* NEGATIVE Final    Bilirubin Urine 11/10/2019 NEGATIVE  NEGATIVE Final    Ketones, Urine 11/10/2019 NEGATIVE  NEGATIVE Final    Specific [place], UA 11/10/2019 1.015  1.005 - 1.030 Final    Urine Hgb 11/10/2019 NEGATIVE  NEGATIVE Final    pH, UA 11/10/2019 7.0  5.0 - 8.0 Final    Protein, UA 11/10/2019 2+* NEGATIVE Final    Urobilinogen, Urine 11/10/2019 Normal  Normal Final    Nitrite, Urine 11/10/2019 NEGATIVE  NEGATIVE Final    Leukocyte Esterase, Urine 11/10/2019 NEGATIVE  NEGATIVE Final    - 11/10/2019        Final    WBC, UA 11/10/2019 None  0 - 5 /HPF Final    RBC, UA 11/10/2019 None  0 - 4 /HPF Final    Reference range defined for non-centrifuged specimen.  Casts UA 11/10/2019 0 TO 2 HYALINE Reference range defined for non-centrifuged specimen. 0 - 8 /LPF Final    Crystals UA 11/10/2019 NOT REPORTED  None /HPF Final    Epithelial Cells UA 11/10/2019 None  0 - 5 /HPF Final    Renal Epithelial, Urine 11/10/2019 NOT REPORTED  0 /HPF Final    Bacteria, UA 11/10/2019 NOT REPORTED  None Final    Mucus, UA 11/10/2019 NOT REPORTED  None Final    Trichomonas, UA 11/10/2019 NOT REPORTED  None Final    Amorphous, UA 11/10/2019 NOT REPORTED  None Final    Other Observations UA 11/10/2019 NOT REPORTED  NOT REQ. Final    Yeast, UA 11/10/2019 NOT REPORTED  None Final    Specimen Description 11/10/2019 . CLEAN CATCH URINE   Final    Special Requests 11/10/2019 NOT REPORTED   Final    Culture 11/10/2019 NO GROWTH   Final    Troponin, High Sensitivity 11/10/2019 21  0 - 22 ng/L Final    Comment:       High Sensitivity Troponin values cannot to evaluate further. We will see him back next week for follow up and AMV. I did ask him to avoid foods high in sodium and to avoid OTC stimulants. As always I encouraged him to quit smoking. EKG: normal sinus rhythm, rate 67, left axis, QTc WNL, non-specific ST/T wave changes, +LVH. All questions answered and addressed to patient satisfaction. Patient understands and agrees to the plan. The patient was evaluated and treated today based on the osteopathic principle that each person is a unit of body, mind, and spirit, the body is capable of self-regulation, self-healing, and health maintenance and that structure and function are reciprocally interrelated. Follow-up:   Return in about 1 week (around 2/6/2020) for AMV; 40 min appt. oJhn Kim D.O. Tobacco Cessation Counseling: Patient advised about behavior change, including information about personal health harms, usage of appropriate cessation measures and benefits of cessation.   Time spent (minutes): 3-10

## 2020-02-07 ENCOUNTER — HOSPITAL ENCOUNTER (OUTPATIENT)
Dept: NON INVASIVE DIAGNOSTICS | Age: 70
Discharge: HOME | End: 2020-02-07
Payer: COMMERCIAL

## 2020-02-07 LAB
LV EF: 60 %
LVEF MODALITY: NORMAL

## 2020-02-07 PROCEDURE — 93306 TTE W/DOPPLER COMPLETE: CPT

## 2020-02-12 RX ORDER — NIFEDIPINE 30 MG/1
TABLET, FILM COATED, EXTENDED RELEASE ORAL
COMMUNITY
End: 2020-03-13

## 2020-02-20 NOTE — TELEPHONE ENCOUNTER
Last visit: 1/30/2020  Last Med refill:   Does patient have enough medication for 72 hours:     Next Visit Date:  Future Appointments   Date Time Provider Soham Wendy   2/28/2020  9:40 AM DO GENIE Allen [facility]   3/30/2020  1:30 PM MD Nakita Cazares Maintenance   Topic Date Due    Hepatitis A vaccine (1 of 2 - Risk 2-dose series) 01/04/1951    Hepatitis B vaccine (1 of 3 - Risk 3-dose series) 01/04/1970    Shingles Vaccine (1 of 2) 01/04/2001    Diabetic retinal exam  08/16/2017    Annual Wellness Visit (AWV)  10/10/2019    Diabetic foot exam  11/22/2020    A1C test (Diabetic or Prediabetic)  12/16/2020    Lipid screen  12/16/2020    Potassium monitoring  12/16/2020    Creatinine monitoring  12/16/2020    Colon cancer screen colonoscopy  10/30/2024    DTaP/Tdap/Td vaccine (2 - Td) 07/22/2029    Flu vaccine  Completed    Pneumococcal 65+ yrs at Risk Vaccine  Completed    AAA screen  Completed    Hib vaccine  Aged Out    Meningococcal (ACWY) vaccine  Aged Out       Hemoglobin A1C   Date Value   12/16/2019      Comment:     See Scan and Media   10/05/2018 9.1 % (H)   02/12/2018 8.2 % (H)             ( goal A1C is < 7)   No results found for: LABMICR  LDL Cholesterol (mg/dL)   Date Value   12/27/2018 75   12/06/2018 57     LDL Calculated (mg/dL)   Date Value   11/04/2015 107       (goal LDL is <100)   AST (U/L)   Date Value   11/10/2019 24     ALT (U/L)   Date Value   11/10/2019 30     BUN (mg/dL)   Date Value   11/10/2019 24 (H)     BP Readings from Last 3 Encounters:   01/30/20 (!) 160/100   12/23/19 138/82   11/22/19 122/72          (goal 120/80)    All Future Testing planned in [facility]  Lab Frequency Next Occurrence   CBC Auto Differential Once 07/10/2019   Comprehensive Metabolic Panel Once 22/69/0383   TSH Once 07/10/2019   T4, Free Once 07/10/2019   XR CHEST STANDARD (2 VW) Once 07/10/2019   XR HIP LEFT (2-3 VIEWS) Once 07/10/2019   Hepatitis C

## 2020-03-13 ENCOUNTER — HOSPITAL ENCOUNTER (EMERGENCY)
Age: 70
Discharge: HOME | End: 2020-03-13
Attending: EMERGENCY MEDICINE
Payer: COMMERCIAL

## 2020-03-13 ENCOUNTER — TELEPHONE (OUTPATIENT)
Dept: FAMILY MEDICINE CLINIC | Age: 70
End: 2020-03-13

## 2020-03-13 VITALS
RESPIRATION RATE: 14 BRPM | BODY MASS INDEX: 22.46 KG/M2 | SYSTOLIC BLOOD PRESSURE: 190 MMHG | TEMPERATURE: 97.9 F | WEIGHT: 175 LBS | DIASTOLIC BLOOD PRESSURE: 91 MMHG | HEART RATE: 84 BPM | OXYGEN SATURATION: 98 % | HEIGHT: 74 IN

## 2020-03-13 LAB
ABSOLUTE EOS #: 0.08 K/UL (ref 0–0.4)
ABSOLUTE IMMATURE GRANULOCYTE: ABNORMAL K/UL (ref 0–0.3)
ABSOLUTE LYMPH #: 0.3 K/UL (ref 1–4.8)
ABSOLUTE MONO #: 0.57 K/UL (ref 0.1–1.3)
ANION GAP SERPL CALCULATED.3IONS-SCNC: 12 MMOL/L (ref 9–17)
BASOPHILS # BLD: 1 % (ref 0–2)
BASOPHILS ABSOLUTE: 0.04 K/UL (ref 0–0.2)
BUN BLDV-MCNC: 31 MG/DL (ref 8–23)
BUN/CREAT BLD: ABNORMAL (ref 9–20)
CALCIUM SERPL-MCNC: 9.3 MG/DL (ref 8.6–10.4)
CHLORIDE BLD-SCNC: 107 MMOL/L (ref 98–107)
CO2: 21 MMOL/L (ref 20–31)
CREAT SERPL-MCNC: 1.84 MG/DL (ref 0.7–1.2)
DIFFERENTIAL TYPE: ABNORMAL
EKG ATRIAL RATE: 86 BPM
EKG P AXIS: 61 DEGREES
EKG P-R INTERVAL: 208 MS
EKG Q-T INTERVAL: 394 MS
EKG QRS DURATION: 92 MS
EKG QTC CALCULATION (BAZETT): 471 MS
EKG R AXIS: 22 DEGREES
EKG T AXIS: 11 DEGREES
EKG VENTRICULAR RATE: 86 BPM
EOSINOPHILS RELATIVE PERCENT: 2 % (ref 0–4)
GFR AFRICAN AMERICAN: 44 ML/MIN
GFR NON-AFRICAN AMERICAN: 37 ML/MIN
GFR SERPL CREATININE-BSD FRML MDRD: ABNORMAL ML/MIN/{1.73_M2}
GFR SERPL CREATININE-BSD FRML MDRD: ABNORMAL ML/MIN/{1.73_M2}
GLUCOSE BLD-MCNC: 156 MG/DL (ref 70–99)
HCT VFR BLD CALC: 28.5 % (ref 41–53)
HEMOGLOBIN: 9.7 G/DL (ref 13.5–17.5)
IMMATURE GRANULOCYTES: ABNORMAL %
LYMPHOCYTES # BLD: 8 % (ref 24–44)
MAGNESIUM: 2.4 MG/DL (ref 1.6–2.6)
MCH RBC QN AUTO: 30.5 PG (ref 26–34)
MCHC RBC AUTO-ENTMCNC: 34.2 G/DL (ref 31–37)
MCV RBC AUTO: 89.4 FL (ref 80–100)
MONOCYTES # BLD: 15 % (ref 1–7)
MORPHOLOGY: ABNORMAL
MYOGLOBIN: 58 NG/ML (ref 28–72)
MYOGLOBIN: 70 NG/ML (ref 28–72)
NRBC AUTOMATED: ABNORMAL PER 100 WBC
PDW BLD-RTO: 12.9 % (ref 11.5–14.9)
PLATELET # BLD: 152 K/UL (ref 150–450)
PLATELET ESTIMATE: ABNORMAL
PMV BLD AUTO: 8.3 FL (ref 6–12)
POTASSIUM SERPL-SCNC: 4.6 MMOL/L (ref 3.7–5.3)
RBC # BLD: 3.19 M/UL (ref 4.5–5.9)
RBC # BLD: ABNORMAL 10*6/UL
SEG NEUTROPHILS: 74 % (ref 36–66)
SEGMENTED NEUTROPHILS ABSOLUTE COUNT: 2.81 K/UL (ref 1.3–9.1)
SODIUM BLD-SCNC: 140 MMOL/L (ref 135–144)
TROPONIN INTERP: ABNORMAL
TROPONIN INTERP: ABNORMAL
TROPONIN T: ABNORMAL NG/ML
TROPONIN T: ABNORMAL NG/ML
TROPONIN, HIGH SENSITIVITY: 25 NG/L (ref 0–22)
TROPONIN, HIGH SENSITIVITY: 26 NG/L (ref 0–22)
WBC # BLD: 3.8 K/UL (ref 3.5–11)
WBC # BLD: ABNORMAL 10*3/UL

## 2020-03-13 PROCEDURE — 93010 ELECTROCARDIOGRAM REPORT: CPT | Performed by: INTERNAL MEDICINE

## 2020-03-13 PROCEDURE — 80048 BASIC METABOLIC PNL TOTAL CA: CPT

## 2020-03-13 PROCEDURE — 93005 ELECTROCARDIOGRAM TRACING: CPT | Performed by: EMERGENCY MEDICINE

## 2020-03-13 PROCEDURE — 85025 COMPLETE CBC W/AUTO DIFF WBC: CPT

## 2020-03-13 PROCEDURE — 99284 EMERGENCY DEPT VISIT MOD MDM: CPT

## 2020-03-13 PROCEDURE — 2580000003 HC RX 258: Performed by: EMERGENCY MEDICINE

## 2020-03-13 PROCEDURE — 83735 ASSAY OF MAGNESIUM: CPT

## 2020-03-13 PROCEDURE — 83874 ASSAY OF MYOGLOBIN: CPT

## 2020-03-13 PROCEDURE — 84484 ASSAY OF TROPONIN QUANT: CPT

## 2020-03-13 PROCEDURE — 96374 THER/PROPH/DIAG INJ IV PUSH: CPT

## 2020-03-13 PROCEDURE — 6370000000 HC RX 637 (ALT 250 FOR IP): Performed by: EMERGENCY MEDICINE

## 2020-03-13 PROCEDURE — 36415 COLL VENOUS BLD VENIPUNCTURE: CPT

## 2020-03-13 PROCEDURE — 6360000002 HC RX W HCPCS: Performed by: EMERGENCY MEDICINE

## 2020-03-13 RX ORDER — MECLIZINE HYDROCHLORIDE 25 MG/1
25 TABLET ORAL ONCE
Status: COMPLETED | OUTPATIENT
Start: 2020-03-13 | End: 2020-03-13

## 2020-03-13 RX ORDER — MECLIZINE HYDROCHLORIDE 25 MG/1
25 TABLET ORAL 3 TIMES DAILY PRN
Qty: 30 TABLET | Refills: 0 | Status: ON HOLD | OUTPATIENT
Start: 2020-03-13 | End: 2020-03-23

## 2020-03-13 RX ORDER — HYDRALAZINE HYDROCHLORIDE 20 MG/ML
10 INJECTION INTRAMUSCULAR; INTRAVENOUS ONCE
Status: COMPLETED | OUTPATIENT
Start: 2020-03-13 | End: 2020-03-13

## 2020-03-13 RX ORDER — 0.9 % SODIUM CHLORIDE 0.9 %
1000 INTRAVENOUS SOLUTION INTRAVENOUS ONCE
Status: COMPLETED | OUTPATIENT
Start: 2020-03-13 | End: 2020-03-13

## 2020-03-13 RX ORDER — NIFEDIPINE 60 MG/1
60 TABLET, EXTENDED RELEASE ORAL DAILY
Qty: 90 TABLET | Refills: 1 | Status: SHIPPED | OUTPATIENT
Start: 2020-03-13 | End: 2020-03-16

## 2020-03-13 RX ADMIN — MECLIZINE HYDROCHLORIDE 25 MG: 25 TABLET ORAL at 07:48

## 2020-03-13 RX ADMIN — SODIUM CHLORIDE 1000 ML: 9 INJECTION, SOLUTION INTRAVENOUS at 08:20

## 2020-03-13 RX ADMIN — HYDRALAZINE HYDROCHLORIDE 10 MG: 20 INJECTION INTRAMUSCULAR; INTRAVENOUS at 09:23

## 2020-03-13 ASSESSMENT — ENCOUNTER SYMPTOMS
EYE PAIN: 0
BLOOD IN STOOL: 0
SORE THROAT: 0
ABDOMINAL PAIN: 0
SHORTNESS OF BREATH: 0
EYE DISCHARGE: 0
CONSTIPATION: 0
DIARRHEA: 0
TROUBLE SWALLOWING: 0
SINUS PRESSURE: 0
RHINORRHEA: 0
COUGH: 0
WHEEZING: 0
NAUSEA: 0
FACIAL SWELLING: 0
CHEST TIGHTNESS: 0
COLOR CHANGE: 0
EYE REDNESS: 0
BACK PAIN: 0
VOMITING: 0

## 2020-03-13 NOTE — ED NOTES
Pt assisted to restroom. Gait steady.  Pt denies dizziness upon ambulation     June Banegas RN  03/13/20 3843

## 2020-03-13 NOTE — ED PROVIDER NOTES
[address] ED  eMERGENCY dEPARTMENT eNCOUnter      Pt Name: Leonardo Francis  MRN: [id number]  [place] 1950  Date of evaluation: 3/13/20      CHIEF COMPLAINT       Chief Complaint   Patient presents with    Dizziness    Hypertension         HISTORY OF PRESENT ILLNESS    Leonardo Francis is a 72 y.o. male who presents complaining of dizziness. Patient states that he woke up this morning a few hours ago turned over in bed and started becoming very dizzy and felt like everything was spinning. Patient states with this he had no chest pain shortness of breath. Patient had no headache numbness tingling weakness or blurry vision. Patient states he still feels kind of weak and dizzy. Patient states he is never had anything like this before. Patient did take his blood pressure medication this morning but states that it is still feeling high. REVIEW OF SYSTEMS       Review of Systems   Constitutional: Positive for fatigue. Negative for activity change, appetite change, chills, diaphoresis and fever. HENT: Negative for congestion, ear pain, facial swelling, nosebleeds, rhinorrhea, sinus pressure, sore throat and trouble swallowing. Eyes: Negative for pain, discharge and redness. Respiratory: Negative for cough, chest tightness, shortness of breath and wheezing. Cardiovascular: Negative for chest pain, palpitations and leg swelling. Gastrointestinal: Negative for abdominal pain, blood in stool, constipation, diarrhea, nausea and vomiting. Genitourinary: Negative for difficulty urinating, dysuria, flank pain, frequency, genital sores and hematuria. Musculoskeletal: Negative for arthralgias, back pain, gait problem, joint swelling, myalgias and neck pain. Skin: Negative for color change, pallor, rash and wound. Neurological: Positive for dizziness. Negative for tremors, seizures, syncope, speech difficulty, weakness, numbness and headaches.    Psychiatric/Behavioral: Negative for confusion, decreased concentration, hallucinations, self-injury, sleep disturbance and suicidal ideas. PAST MEDICAL HISTORY     Past Medical History:   Diagnosis Date    Diabetes mellitus ([address]     Hemodialysis patient [facility])     patient had Kidney transplant    Hepatitis C     Hepatitis C without hepatic coma     Hyperlipidemia     Hypertension        SURGICAL HISTORY       Past Surgical History:   Procedure Laterality Date    COLONOSCOPY N/A 10/30/2019    COLONOSCOPY WITH BIOPSY performed by Xu Strange MD at [address] [place] [address] Right 11/2015    at [facility] [address] [facility]  2017       CURRENT MEDICATIONS       Previous Medications    ATORVASTATIN (LIPITOR) 40 MG TABLET    TAKE 1 TABLET BY MOUTH DAILY    DULAGLUTIDE (TRULICITY) 1.41 TX/2.0VU SOPN    Inject 0.75 mg into the skin once a week    FREESTYLE LITE STRIP    USE TO TEST TWICE A DAY AS DIRECTED    GABAPENTIN (NEURONTIN) 100 MG CAPSULE    Take 1 capsule by mouth 2 times daily for 90 days. GLUCOSE MONITORING KIT (FREESTYLE) MONITORING KIT    Pt testing bid. True test glucometer    HUMALOG KWIKPEN 100 UNIT/ML SOPN        INSULIN GLARGINE (LANTUS SOLOSTAR) 100 UNIT/ML INJECTION PEN    Inject 18 Units into the skin 2 times daily    INSULIN LISPRO (HUMALOG KWIKPEN) 100 UNIT/ML PEN    Per Sliding scale. Max 28 units per day    INSULIN PEN NEEDLE (COMFORT EZ PEN NEEDLES) 32G X 6 MM MISC    USE AS DIRECTED WITH INSULIN PEN(S) UP TO 5 TIMES A DAY    INSULIN PEN NEEDLE (ULTICARE MINI PEN NEEDLES) 31G X 6 MM MISC    PATIENT IS TESTING UP TO 5 TIMES DAILY    MAGNESIUM OXIDE (MAG-OX) 400 MG TABLET    Take 1 tablet by mouth 2 times daily    METOPROLOL SUCCINATE (TOPROL XL) 25 MG EXTENDED RELEASE TABLET    TAKE 1 TABLET BY MOUTH ONCE DAILY    NIFEDIPINE (ADALAT CC) 30 MG EXTENDED RELEASE TABLET    nifedipine ER 30 mg tablet,extended release   Take 1 tablet every day by oral route.     NIFEDIPINE (PROCARDIA XL) 30 MG EXTENDED RELEASE TABLET    Take 1 tablet by mouth daily    TACROLIMUS (PROGRAF) 1 MG CAPSULE    Take 6 mg by mouth 2 times daily    TAMSULOSIN (FLOMAX) 0.4 MG CAPSULE    Take 1 capsule by mouth daily    ZORTRESS 1 MG TABS           ALLERGIES     is allergic to amino acids and lisinopril. FAMILY HISTORY     [unfilled]     SOCIAL HISTORY      reports that he quit smoking about 6 years ago. His smoking use included cigarettes. He has a 25.00 pack-year smoking history. He has never used smokeless tobacco. He reports previous drug use. Drug: Other-see comments. He reports that he does not drink alcohol. PHYSICAL EXAM     INITIAL VITALS: BP (!) 190/91   Pulse 84   Temp 97.9 °F (36.6 °C) (Oral)   Resp 14   Ht 6' 2\" (1.88 m)   Wt 175 lb (79.4 kg)   SpO2 98%   BMI 22.47 kg/m²      Physical Exam  Vitals signs and nursing note reviewed. Constitutional:       General: He is not in acute distress. Appearance: He is well-developed. He is not diaphoretic. HENT:      Head: Normocephalic and atraumatic. Eyes:      General: No scleral icterus. Right eye: No discharge. Left eye: No discharge. Conjunctiva/sclera: Conjunctivae normal.      Pupils: Pupils are equal, round, and reactive to light. Cardiovascular:      Rate and Rhythm: Normal rate and regular rhythm. Heart sounds: Normal heart sounds. No murmur. No friction rub. No gallop. Pulmonary:      Effort: Pulmonary effort is normal. No respiratory distress. Breath sounds: Normal breath sounds. No wheezing or rales. Chest:      Chest wall: No tenderness. Abdominal:      General: Bowel sounds are normal. There is no distension. Palpations: Abdomen is soft. There is no mass. Tenderness: There is no abdominal tenderness. There is no guarding or rebound. Musculoskeletal: Normal range of motion. General: No tenderness. Skin:     General: Skin is warm and dry. Coloration: Skin is not pale. Findings: No erythema or rash. Neurological:      Mental Status: He is alert and oriented to person, place, and time. Cranial Nerves: No cranial nerve deficit. Sensory: No sensory deficit. Motor: No abnormal muscle tone. Coordination: Coordination normal.      Deep Tendon Reflexes: Reflexes normal.   Psychiatric:         Behavior: Behavior normal.         Thought Content: Thought content normal.         Judgment: Judgment normal.         MEDICAL DECISION MAKING:     Patient symptoms I believe are peripheral in nature and probably coming from the fact that his blood pressure is elevated but he has no symptoms to suggest a central cause. I will do some basic blood work try to get him feeling better and then will manage his blood pressure if it does not come down any after being here for a while. DIAGNOSTIC RESULTS     EKG: All EKG's are interpreted by the Emergency Department Physician who either signs or Co-signs this chart in the absence of a cardiologist.    EKG Interpretation    Interpreted by me    Rhythm: normal sinus   Rate: normal  Axis: normal  Ectopy: none  Conduction: normal  ST Segments: no acute change  T Waves: no acute change  Q Waves: none    Clinical Impression: no acute changes and normal EKG    RADIOLOGY:All plain film, CT, MRI, and formal ultrasound images (except ED bedside ultrasound)are read by the radiologist and interpretations are directly viewed by the emergency physician. No results found. LABS: All lab results were reviewed bytannerf, and all abnormals are listed below.   Labs Reviewed   BASIC METABOLIC PANEL - Abnormal; Notable for the following components:       Result Value    Glucose 156 (*)     BUN 31 (*)     CREATININE 1.84 (*)     GFR Non- 37 (*)     GFR  44 (*)     All other components within normal limits   CBC WITH AUTO DIFFERENTIAL - Abnormal; Notable for the following components:    RBC 3.19 (*)     Hemoglobin 9.7 (*)     Hematocrit 28.5 (*)     Seg Neutrophils 74 (*)     Lymphocytes 8 (*)     Monocytes 15 (*)     Absolute Lymph # 0.30 (*)     All other components within normal limits   TROP/MYOGLOBIN - Abnormal; Notable for the following components:    Troponin, High Sensitivity 26 (*)     All other components within normal limits   TROP/MYOGLOBIN - Abnormal; Notable for the following components:    Troponin, High Sensitivity 25 (*)     All other components within normal limits   MAGNESIUM         EMERGENCY DEPARTMENT COURSE:   Vitals:    Vitals:    03/13/20 0930 03/13/20 0945 03/13/20 1000 03/13/20 1015   BP: (!) 180/84 (!) 180/87 (!) 182/93 (!) 190/91   Pulse: 87 90 82 84   Resp: 19 17 12 14   Temp:       TempSrc:       SpO2:       Weight:       Height:           The patient was given the following medications while in the emergency department:     Orders Placed This Encounter   Medications    0.9 % sodium chloride bolus    meclizine (ANTIVERT) tablet 25 mg    hydrALAZINE (APRESOLINE) injection 10 mg    meclizine (ANTIVERT) 25 MG tablet     Sig: Take 1 tablet by mouth 3 times daily as needed for Dizziness     Dispense:  30 tablet     Refill:  0       -------------------------  10:59 AM EDT  Patient is reevaluated feeling better was able to get up with no dizziness and therefore be discharged home. CRITICAL CARE:   None    CONSULTS:  None    PROCEDURES:  None    FINAL IMPRESSION      1. Dizziness    2.  Secondary hypertension          DISPOSITION/PLAN   DISPOSITION Decision To Discharge 03/13/2020 10:58:27 AM      PATIENT REFERRED TO:  Hilda Real, [address] [facility] [address] [place] [address] [address]    In 3 days      [address] ED  [facility]  [address] [facility]  [phone]    If symptoms worsen      DISCHARGE MEDICATIONS:  New Prescriptions    MECLIZINE (ANTIVERT) 25 MG TABLET    Take 1 tablet by mouth 3 times daily as needed for Dizziness       (Please note that portions of this note were completed with a voice recognition program.  Efforts were made to edit the dictations but occasionally words are mis-transcribed.)    Laureen Valles MD  Attending Alma Moore MD  03/13/20 9519

## 2020-03-13 NOTE — ED NOTES

## 2020-03-14 LAB
EKG ATRIAL RATE: 84 BPM
EKG P AXIS: 76 DEGREES
EKG P-R INTERVAL: 192 MS
EKG Q-T INTERVAL: 400 MS
EKG QRS DURATION: 90 MS
EKG QTC CALCULATION (BAZETT): 472 MS
EKG R AXIS: 26 DEGREES
EKG T AXIS: 43 DEGREES
EKG VENTRICULAR RATE: 84 BPM

## 2020-03-14 PROCEDURE — 93010 ELECTROCARDIOGRAM REPORT: CPT | Performed by: INTERNAL MEDICINE

## 2020-03-16 ENCOUNTER — OFFICE VISIT (OUTPATIENT)
Dept: FAMILY MEDICINE CLINIC | Age: 70
End: 2020-03-16
Payer: COMMERCIAL

## 2020-03-16 VITALS
OXYGEN SATURATION: 98 % | DIASTOLIC BLOOD PRESSURE: 70 MMHG | WEIGHT: 179.8 LBS | HEART RATE: 91 BPM | SYSTOLIC BLOOD PRESSURE: 160 MMHG | BODY MASS INDEX: 23.08 KG/M2

## 2020-03-16 PROCEDURE — 99214 OFFICE O/P EST MOD 30 MIN: CPT | Performed by: FAMILY MEDICINE

## 2020-03-16 PROCEDURE — G8482 FLU IMMUNIZE ORDER/ADMIN: HCPCS | Performed by: FAMILY MEDICINE

## 2020-03-16 PROCEDURE — 3017F COLORECTAL CA SCREEN DOC REV: CPT | Performed by: FAMILY MEDICINE

## 2020-03-16 PROCEDURE — 4040F PNEUMOC VAC/ADMIN/RCVD: CPT | Performed by: FAMILY MEDICINE

## 2020-03-16 PROCEDURE — 3046F HEMOGLOBIN A1C LEVEL >9.0%: CPT | Performed by: FAMILY MEDICINE

## 2020-03-16 PROCEDURE — G8420 CALC BMI NORM PARAMETERS: HCPCS | Performed by: FAMILY MEDICINE

## 2020-03-16 PROCEDURE — 1123F ACP DISCUSS/DSCN MKR DOCD: CPT | Performed by: FAMILY MEDICINE

## 2020-03-16 PROCEDURE — G8427 DOCREV CUR MEDS BY ELIG CLIN: HCPCS | Performed by: FAMILY MEDICINE

## 2020-03-16 PROCEDURE — 1036F TOBACCO NON-USER: CPT | Performed by: FAMILY MEDICINE

## 2020-03-16 PROCEDURE — 2022F DILAT RTA XM EVC RTNOPTHY: CPT | Performed by: FAMILY MEDICINE

## 2020-03-16 PROCEDURE — 93000 ELECTROCARDIOGRAM COMPLETE: CPT | Performed by: FAMILY MEDICINE

## 2020-03-16 RX ORDER — NIFEDIPINE 90 MG/1
90 TABLET, EXTENDED RELEASE ORAL DAILY
Qty: 90 TABLET | Refills: 0 | Status: SHIPPED | OUTPATIENT
Start: 2020-03-16 | End: 2020-04-14 | Stop reason: SDUPTHER

## 2020-03-23 ENCOUNTER — APPOINTMENT (OUTPATIENT)
Dept: GENERAL RADIOLOGY | Age: 70
DRG: 698 | End: 2020-03-23
Payer: COMMERCIAL

## 2020-03-23 ENCOUNTER — TELEPHONE (OUTPATIENT)
Dept: FAMILY MEDICINE CLINIC | Age: 70
End: 2020-03-23

## 2020-03-23 ENCOUNTER — OFFICE VISIT (OUTPATIENT)
Dept: FAMILY MEDICINE CLINIC | Age: 70
End: 2020-03-23
Payer: COMMERCIAL

## 2020-03-23 ENCOUNTER — HOSPITAL ENCOUNTER (INPATIENT)
Age: 70
LOS: 2 days | Discharge: HOME | DRG: 698 | End: 2020-03-25
Attending: EMERGENCY MEDICINE | Admitting: INTERNAL MEDICINE
Payer: COMMERCIAL

## 2020-03-23 VITALS
DIASTOLIC BLOOD PRESSURE: 78 MMHG | WEIGHT: 173.2 LBS | HEART RATE: 115 BPM | BODY MASS INDEX: 22.24 KG/M2 | OXYGEN SATURATION: 97 % | TEMPERATURE: 99 F | SYSTOLIC BLOOD PRESSURE: 152 MMHG

## 2020-03-23 PROBLEM — N17.9 ACUTE RENAL FAILURE (ARF) (HCC): Status: ACTIVE | Noted: 2020-03-23

## 2020-03-23 PROBLEM — D64.9 NORMOCHROMIC NORMOCYTIC ANEMIA: Status: ACTIVE | Noted: 2020-03-23

## 2020-03-23 PROBLEM — D84.9 IMMUNOSUPPRESSED STATUS (HCC): Status: ACTIVE | Noted: 2020-03-23

## 2020-03-23 PROBLEM — R74.02 ELEVATED LDH: Status: ACTIVE | Noted: 2020-03-23

## 2020-03-23 PROBLEM — R79.82 ELEVATED C-REACTIVE PROTEIN (CRP): Status: ACTIVE | Noted: 2020-03-23

## 2020-03-23 LAB
-: ABNORMAL
ABSOLUTE EOS #: 0.1 K/UL (ref 0–0.4)
ABSOLUTE IMMATURE GRANULOCYTE: 0 K/UL (ref 0–0.3)
ABSOLUTE LYMPH #: 0.71 K/UL (ref 1–4.8)
ABSOLUTE MONO #: 0.97 K/UL (ref 0.2–0.8)
ALBUMIN SERPL-MCNC: 4 G/DL (ref 3.5–5.2)
ALBUMIN/GLOBULIN RATIO: ABNORMAL (ref 1–2.5)
ALP BLD-CCNC: 123 U/L (ref 40–129)
ALT SERPL-CCNC: 12 U/L (ref 5–41)
AMORPHOUS: ABNORMAL
ANION GAP SERPL CALCULATED.3IONS-SCNC: 14 MMOL/L (ref 9–17)
AST SERPL-CCNC: 17 U/L
BACTERIA: ABNORMAL
BASOPHILS # BLD: 0 %
BASOPHILS ABSOLUTE: 0 K/UL (ref 0–0.2)
BILIRUB SERPL-MCNC: 0.59 MG/DL (ref 0.3–1.2)
BILIRUBIN URINE: NEGATIVE
BUN BLDV-MCNC: 40 MG/DL (ref 8–23)
BUN/CREAT BLD: 18 (ref 9–20)
C-REACTIVE PROTEIN: 75.8 MG/L (ref 0–5)
CALCIUM SERPL-MCNC: 9.4 MG/DL (ref 8.6–10.4)
CASTS UA: ABNORMAL /LPF
CHLORIDE BLD-SCNC: 98 MMOL/L (ref 98–107)
CO2: 21 MMOL/L (ref 20–31)
COLOR: YELLOW
COMMENT UA: ABNORMAL
CREAT SERPL-MCNC: 2.25 MG/DL (ref 0.7–1.2)
CRYSTALS, UA: ABNORMAL /HPF
DIFFERENTIAL TYPE: ABNORMAL
EOSINOPHILS RELATIVE PERCENT: 2 % (ref 1–4)
EPITHELIAL CELLS UA: ABNORMAL /HPF (ref 0–5)
FERRITIN: 641 UG/L (ref 30–400)
GFR AFRICAN AMERICAN: 35 ML/MIN
GFR NON-AFRICAN AMERICAN: 29 ML/MIN
GFR SERPL CREATININE-BSD FRML MDRD: ABNORMAL ML/MIN/{1.73_M2}
GFR SERPL CREATININE-BSD FRML MDRD: ABNORMAL ML/MIN/{1.73_M2}
GLUCOSE BLD-MCNC: 334 MG/DL (ref 70–99)
GLUCOSE URINE: ABNORMAL
HCT VFR BLD CALC: 28.4 % (ref 40.7–50.3)
HEMOGLOBIN: 9.1 G/DL (ref 13–17)
IMMATURE GRANULOCYTES: 0 %
INR BLD: 1.1
KETONES, URINE: NEGATIVE
LACTATE DEHYDROGENASE: 337 U/L (ref 135–225)
LACTIC ACID: 1.1 MMOL/L (ref 0.5–2.2)
LEUKOCYTE ESTERASE, URINE: NEGATIVE
LYMPHOCYTES # BLD: 14 % (ref 24–44)
MCH RBC QN AUTO: 29 PG (ref 25.2–33.5)
MCHC RBC AUTO-ENTMCNC: 32 G/DL (ref 28.4–34.8)
MCV RBC AUTO: 90.4 FL (ref 82.6–102.9)
MONOCYTES # BLD: 19 % (ref 1–7)
MUCUS: ABNORMAL
NITRITE, URINE: NEGATIVE
NRBC AUTOMATED: 0 PER 100 WBC
OSMOLALITY URINE: 425 MOSM/KG (ref 300–1170)
OTHER OBSERVATIONS UA: ABNORMAL
PDW BLD-RTO: 11.9 % (ref 11.8–14.4)
PH UA: 6.5 (ref 5–8)
PLATELET # BLD: 160 K/UL (ref 138–453)
PLATELET ESTIMATE: ABNORMAL
PMV BLD AUTO: 10.7 FL (ref 8.1–13.5)
POTASSIUM SERPL-SCNC: 5 MMOL/L (ref 3.7–5.3)
PROCALCITONIN: 0.18 NG/ML
PROTEIN UA: ABNORMAL
PROTHROMBIN TIME: 10.9 SEC (ref 9.7–11.6)
RBC # BLD: 3.14 M/UL (ref 4.21–5.77)
RBC # BLD: ABNORMAL 10*6/UL
RBC UA: ABNORMAL /HPF (ref 0–2)
RENAL EPITHELIAL, UA: ABNORMAL /HPF
SEG NEUTROPHILS: 65 % (ref 36–66)
SEGMENTED NEUTROPHILS ABSOLUTE COUNT: 3.32 K/UL (ref 1.8–7.7)
SODIUM BLD-SCNC: 133 MMOL/L (ref 135–144)
SPECIFIC GRAVITY UA: 1.01 (ref 1–1.03)
TOTAL PROTEIN: 7.7 G/DL (ref 6.4–8.3)
TRICHOMONAS: ABNORMAL
TURBIDITY: CLEAR
URIC ACID: 5.4 MG/DL (ref 3.4–7)
URINE HGB: ABNORMAL
UROBILINOGEN, URINE: NORMAL
WBC # BLD: 5.1 K/UL (ref 3.5–11.3)
WBC # BLD: ABNORMAL 10*3/UL
WBC UA: ABNORMAL /HPF (ref 0–5)
YEAST: ABNORMAL

## 2020-03-23 PROCEDURE — 82947 ASSAY GLUCOSE BLOOD QUANT: CPT

## 2020-03-23 PROCEDURE — 6370000000 HC RX 637 (ALT 250 FOR IP): Performed by: EMERGENCY MEDICINE

## 2020-03-23 PROCEDURE — 87205 SMEAR GRAM STAIN: CPT

## 2020-03-23 PROCEDURE — 99285 EMERGENCY DEPT VISIT HI MDM: CPT

## 2020-03-23 PROCEDURE — 99223 1ST HOSP IP/OBS HIGH 75: CPT | Performed by: INTERNAL MEDICINE

## 2020-03-23 PROCEDURE — 81003 URINALYSIS AUTO W/O SCOPE: CPT

## 2020-03-23 PROCEDURE — 0100U HC RESPIRPTHGN MULT REV TRANS & AMP PRB TECH 21 TRGT: CPT

## 2020-03-23 PROCEDURE — 1123F ACP DISCUSS/DSCN MKR DOCD: CPT | Performed by: FAMILY MEDICINE

## 2020-03-23 PROCEDURE — 87899 AGENT NOS ASSAY W/OPTIC: CPT

## 2020-03-23 PROCEDURE — 87070 CULTURE OTHR SPECIMN AEROBIC: CPT

## 2020-03-23 PROCEDURE — 80053 COMPREHEN METABOLIC PANEL: CPT

## 2020-03-23 PROCEDURE — 85610 PROTHROMBIN TIME: CPT

## 2020-03-23 PROCEDURE — 82728 ASSAY OF FERRITIN: CPT

## 2020-03-23 PROCEDURE — 84156 ASSAY OF PROTEIN URINE: CPT

## 2020-03-23 PROCEDURE — 83615 LACTATE (LD) (LDH) ENZYME: CPT

## 2020-03-23 PROCEDURE — 6370000000 HC RX 637 (ALT 250 FOR IP): Performed by: INTERNAL MEDICINE

## 2020-03-23 PROCEDURE — 3046F HEMOGLOBIN A1C LEVEL >9.0%: CPT | Performed by: FAMILY MEDICINE

## 2020-03-23 PROCEDURE — 83605 ASSAY OF LACTIC ACID: CPT

## 2020-03-23 PROCEDURE — 2580000003 HC RX 258: Performed by: EMERGENCY MEDICINE

## 2020-03-23 PROCEDURE — 2580000003 HC RX 258: Performed by: INTERNAL MEDICINE

## 2020-03-23 PROCEDURE — G8420 CALC BMI NORM PARAMETERS: HCPCS | Performed by: FAMILY MEDICINE

## 2020-03-23 PROCEDURE — 85025 COMPLETE CBC W/AUTO DIFF WBC: CPT

## 2020-03-23 PROCEDURE — 96360 HYDRATION IV INFUSION INIT: CPT

## 2020-03-23 PROCEDURE — 2060000000 HC ICU INTERMEDIATE R&B

## 2020-03-23 PROCEDURE — 1036F TOBACCO NON-USER: CPT | Performed by: FAMILY MEDICINE

## 2020-03-23 PROCEDURE — 82570 ASSAY OF URINE CREATININE: CPT

## 2020-03-23 PROCEDURE — 96361 HYDRATE IV INFUSION ADD-ON: CPT

## 2020-03-23 PROCEDURE — G8482 FLU IMMUNIZE ORDER/ADMIN: HCPCS | Performed by: FAMILY MEDICINE

## 2020-03-23 PROCEDURE — 83935 ASSAY OF URINE OSMOLALITY: CPT

## 2020-03-23 PROCEDURE — G8427 DOCREV CUR MEDS BY ELIG CLIN: HCPCS | Performed by: FAMILY MEDICINE

## 2020-03-23 PROCEDURE — 4040F PNEUMOC VAC/ADMIN/RCVD: CPT | Performed by: FAMILY MEDICINE

## 2020-03-23 PROCEDURE — 84300 ASSAY OF URINE SODIUM: CPT

## 2020-03-23 PROCEDURE — 99214 OFFICE O/P EST MOD 30 MIN: CPT | Performed by: FAMILY MEDICINE

## 2020-03-23 PROCEDURE — U0002 COVID-19 LAB TEST NON-CDC: HCPCS

## 2020-03-23 PROCEDURE — 87449 NOS EACH ORGANISM AG IA: CPT

## 2020-03-23 PROCEDURE — 87040 BLOOD CULTURE FOR BACTERIA: CPT

## 2020-03-23 PROCEDURE — 3017F COLORECTAL CA SCREEN DOC REV: CPT | Performed by: FAMILY MEDICINE

## 2020-03-23 PROCEDURE — 2022F DILAT RTA XM EVC RTNOPTHY: CPT | Performed by: FAMILY MEDICINE

## 2020-03-23 PROCEDURE — 71045 X-RAY EXAM CHEST 1 VIEW: CPT

## 2020-03-23 PROCEDURE — 84550 ASSAY OF BLOOD/URIC ACID: CPT

## 2020-03-23 PROCEDURE — 87086 URINE CULTURE/COLONY COUNT: CPT

## 2020-03-23 PROCEDURE — 86140 C-REACTIVE PROTEIN: CPT

## 2020-03-23 PROCEDURE — 81001 URINALYSIS AUTO W/SCOPE: CPT

## 2020-03-23 PROCEDURE — 84145 PROCALCITONIN (PCT): CPT

## 2020-03-23 RX ORDER — ONDANSETRON 2 MG/ML
4 INJECTION INTRAMUSCULAR; INTRAVENOUS EVERY 6 HOURS PRN
Status: DISCONTINUED | OUTPATIENT
Start: 2020-03-23 | End: 2020-03-25 | Stop reason: HOSPADM

## 2020-03-23 RX ORDER — ALBUTEROL SULFATE 90 UG/1
2 AEROSOL, METERED RESPIRATORY (INHALATION) EVERY 6 HOURS PRN
Status: DISCONTINUED | OUTPATIENT
Start: 2020-03-23 | End: 2020-03-25 | Stop reason: HOSPADM

## 2020-03-23 RX ORDER — ACETAMINOPHEN 325 MG/1
650 TABLET ORAL EVERY 6 HOURS PRN
Status: DISCONTINUED | OUTPATIENT
Start: 2020-03-23 | End: 2020-03-25 | Stop reason: HOSPADM

## 2020-03-23 RX ORDER — PROMETHAZINE HYDROCHLORIDE 25 MG/1
12.5 TABLET ORAL EVERY 6 HOURS PRN
Status: DISCONTINUED | OUTPATIENT
Start: 2020-03-23 | End: 2020-03-25 | Stop reason: HOSPADM

## 2020-03-23 RX ORDER — SODIUM CHLORIDE 0.9 % (FLUSH) 0.9 %
10 SYRINGE (ML) INJECTION PRN
Status: DISCONTINUED | OUTPATIENT
Start: 2020-03-23 | End: 2020-03-25 | Stop reason: HOSPADM

## 2020-03-23 RX ORDER — ATORVASTATIN CALCIUM 40 MG/1
40 TABLET, FILM COATED ORAL DAILY
Status: DISCONTINUED | OUTPATIENT
Start: 2020-03-24 | End: 2020-03-25 | Stop reason: HOSPADM

## 2020-03-23 RX ORDER — GABAPENTIN 100 MG/1
100 CAPSULE ORAL 2 TIMES DAILY
Status: DISCONTINUED | OUTPATIENT
Start: 2020-03-23 | End: 2020-03-25 | Stop reason: HOSPADM

## 2020-03-23 RX ORDER — SODIUM CHLORIDE 0.9 % (FLUSH) 0.9 %
10 SYRINGE (ML) INJECTION EVERY 12 HOURS SCHEDULED
Status: DISCONTINUED | OUTPATIENT
Start: 2020-03-23 | End: 2020-03-25 | Stop reason: HOSPADM

## 2020-03-23 RX ORDER — 0.9 % SODIUM CHLORIDE 0.9 %
1000 INTRAVENOUS SOLUTION INTRAVENOUS ONCE
Status: COMPLETED | OUTPATIENT
Start: 2020-03-23 | End: 2020-03-23

## 2020-03-23 RX ORDER — DEXTROSE MONOHYDRATE 50 MG/ML
100 INJECTION, SOLUTION INTRAVENOUS PRN
Status: DISCONTINUED | OUTPATIENT
Start: 2020-03-23 | End: 2020-03-25 | Stop reason: HOSPADM

## 2020-03-23 RX ORDER — SODIUM CHLORIDE 9 MG/ML
INJECTION, SOLUTION INTRAVENOUS CONTINUOUS
Status: DISCONTINUED | OUTPATIENT
Start: 2020-03-23 | End: 2020-03-25 | Stop reason: HOSPADM

## 2020-03-23 RX ORDER — MECLIZINE HCL 12.5 MG/1
25 TABLET ORAL 3 TIMES DAILY PRN
Status: DISCONTINUED | OUTPATIENT
Start: 2020-03-23 | End: 2020-03-25 | Stop reason: HOSPADM

## 2020-03-23 RX ORDER — NICOTINE POLACRILEX 4 MG
15 LOZENGE BUCCAL PRN
Status: DISCONTINUED | OUTPATIENT
Start: 2020-03-23 | End: 2020-03-25 | Stop reason: HOSPADM

## 2020-03-23 RX ORDER — TAMSULOSIN HYDROCHLORIDE 0.4 MG/1
0.4 CAPSULE ORAL DAILY
Status: DISCONTINUED | OUTPATIENT
Start: 2020-03-24 | End: 2020-03-25 | Stop reason: HOSPADM

## 2020-03-23 RX ORDER — ACETAMINOPHEN 650 MG/1
650 SUPPOSITORY RECTAL EVERY 6 HOURS PRN
Status: DISCONTINUED | OUTPATIENT
Start: 2020-03-23 | End: 2020-03-25 | Stop reason: HOSPADM

## 2020-03-23 RX ORDER — METOPROLOL SUCCINATE 25 MG/1
25 TABLET, EXTENDED RELEASE ORAL DAILY
Status: DISCONTINUED | OUTPATIENT
Start: 2020-03-24 | End: 2020-03-25 | Stop reason: HOSPADM

## 2020-03-23 RX ORDER — SODIUM CHLORIDE 9 MG/ML
INJECTION, SOLUTION INTRAVENOUS CONTINUOUS
Status: DISCONTINUED | OUTPATIENT
Start: 2020-03-23 | End: 2020-03-23 | Stop reason: SDUPTHER

## 2020-03-23 RX ORDER — DEXTROSE MONOHYDRATE 25 G/50ML
12.5 INJECTION, SOLUTION INTRAVENOUS PRN
Status: DISCONTINUED | OUTPATIENT
Start: 2020-03-23 | End: 2020-03-25 | Stop reason: HOSPADM

## 2020-03-23 RX ORDER — INSULIN GLARGINE 100 [IU]/ML
10 INJECTION, SOLUTION SUBCUTANEOUS 2 TIMES DAILY
Status: DISCONTINUED | OUTPATIENT
Start: 2020-03-23 | End: 2020-03-24

## 2020-03-23 RX ORDER — ACETAMINOPHEN 325 MG/1
650 TABLET ORAL ONCE
Status: COMPLETED | OUTPATIENT
Start: 2020-03-23 | End: 2020-03-23

## 2020-03-23 RX ORDER — NIFEDIPINE 90 MG/1
90 TABLET, EXTENDED RELEASE ORAL DAILY
Status: DISCONTINUED | OUTPATIENT
Start: 2020-03-24 | End: 2020-03-25 | Stop reason: HOSPADM

## 2020-03-23 RX ADMIN — INSULIN GLARGINE 10 UNITS: 100 INJECTION, SOLUTION SUBCUTANEOUS at 23:35

## 2020-03-23 RX ADMIN — SODIUM CHLORIDE: 9 INJECTION, SOLUTION INTRAVENOUS at 22:43

## 2020-03-23 RX ADMIN — GABAPENTIN 100 MG: 100 CAPSULE ORAL at 23:36

## 2020-03-23 RX ADMIN — ACETAMINOPHEN 650 MG: 325 TABLET ORAL at 15:50

## 2020-03-23 RX ADMIN — SODIUM CHLORIDE 1000 ML: 9 INJECTION, SOLUTION INTRAVENOUS at 14:38

## 2020-03-23 RX ADMIN — SODIUM CHLORIDE: 9 INJECTION, SOLUTION INTRAVENOUS at 17:24

## 2020-03-23 RX ADMIN — ACETAMINOPHEN 650 MG: 325 TABLET ORAL at 23:36

## 2020-03-23 RX ADMIN — INSULIN LISPRO 2 UNITS: 100 INJECTION, SOLUTION INTRAVENOUS; SUBCUTANEOUS at 23:36

## 2020-03-23 ASSESSMENT — ENCOUNTER SYMPTOMS
EYE REDNESS: 0
FACIAL SWELLING: 0
COUGH: 1
SHORTNESS OF BREATH: 0
VOMITING: 0
ABDOMINAL PAIN: 0
COLOR CHANGE: 0
CONSTIPATION: 0
EYE DISCHARGE: 0
DIARRHEA: 0

## 2020-03-23 ASSESSMENT — PAIN SCALES - GENERAL
PAINLEVEL_OUTOF10: 6
PAINLEVEL_OUTOF10: 6

## 2020-03-23 NOTE — PROGRESS NOTES
DIALYSIS FISTULA CREATION Right 2015    at [facility] [address]    KIDNEY TRANSPLANT  2017       Family History   Problem Relation Age of Onset    Cancer Mother     Cancer Father     Cancer Brother         Social History:     Social History     Socioeconomic History    Marital status:      Spouse name: Not on file    Number of children: Not on file    Years of education: Not on file    Highest education level: Not on file   Occupational History    Not on file   Social Needs    Financial resource strain: Somewhat hard    Food insecurity     Worry: Often true     Inability: Often true    Transportation needs     Medical: Yes     Non-medical: Yes   Tobacco Use    Smoking status: Former Smoker     Packs/day: 0.50     Years: 50.00     Pack years: 25.00     Types: Cigarettes     Last attempt to quit: 2013     Years since quittin.9    Smokeless tobacco: Never Used   Substance and Sexual Activity    Alcohol use: No    Drug use: Not Currently     Types: Other-see comments     Comment: used marijuana and crack cocaine previously - last use over one year ago    Sexual activity: Never   Lifestyle    Physical activity     Days per week: Not on file     Minutes per session: Not on file    Stress: Not on file   Relationships    Social connections     Talks on phone: Not on file     Gets together: Not on file     Attends Scientologist service: Not on file     Active member of club or organization: Not on file     Attends meetings of clubs or organizations: Not on file     Relationship status: Not on file    Intimate partner violence     Fear of current or ex partner: Not on file     Emotionally abused: Not on file     Physically abused: Not on file     Forced sexual activity: Not on file   Other Topics Concern    Not on file   Social History Narrative    Not on file        ROS:     Constitutional: No fevers or chills; + fatigue.   ENT: + nasal congestion and sore throat  Respiratory: No difficulty in breathing; + cough. Cardiovascular: No chest pain, palpitations or shortness of breath  Gastrointestinal: No abdominal pain or change in bowel movements. Genitourinary: No change in urinary frequency or dysuria. Skin: No rashes or skin lesions. Neurological: No weakness. No headaches. Last Filed Vitals:  BP (!) 152/78   Pulse 115   Temp 99 °F (37.2 °C) (Oral)   Wt 173 lb 3.2 oz (78.6 kg)   SpO2 97%   BMI 22.24 kg/m²      Physical Examination:     GENERAL APPEARANCE: in no acute distress, uncomfortable appearing. HEAD: normocephalic, atraumatic. EYES: extraocular movement intact (EOMI), pupils equal, round, reactive to light and accommodation. EARS: normal, tympanic membrane intact, clear, auditory canal clear. NOSE: nares patent, no erythema, sinuses nontender bilaterally, no rhinorrhea. ORAL CAVITY: mucosa moist, no lesions. THROAT: clear, no mass, no exudate. NECK/THYROID: neck supple, full range of motion, no thyromegaly. HEART: no murmurs, tachycardic with regular rhythm, S1, S2 normal.   LUNGS: clear to auscultation bilaterally, no wheezes, rales, rhonchi.    ABDOMEN: normal, bowel sounds present, soft, nontender, nondistended, no rebound guarding or rigidity    Recent Labs/ In Office Testing/ Radiograph review:     Admission on 03/13/2020, Discharged on 03/13/2020   Component Date Value Ref Range Status    Glucose 03/13/2020 156* 70 - 99 mg/dL Final    BUN 03/13/2020 31* 8 - 23 mg/dL Final    CREATININE 03/13/2020 1.84* 0.70 - 1.20 mg/dL Final    Bun/Cre Ratio 03/13/2020 NOT REPORTED  9 - 20 Final    Calcium 03/13/2020 9.3  8.6 - 10.4 mg/dL Final    Sodium 03/13/2020 140  135 - 144 mmol/L Final    Potassium 03/13/2020 4.6  3.7 - 5.3 mmol/L Final    Chloride 03/13/2020 107  98 - 107 mmol/L Final    CO2 03/13/2020 21  20 - 31 mmol/L Final    Anion Gap 03/13/2020 12  9 - 17 mmol/L Final    GFR Non- 03/13/2020 37* >60 mL/min Final    GFR  03/13/2020 44* >60 mL/min Final    GFR Comment 03/13/2020        Final    Comment: Average GFR for 61-76 years old:   80 mL/min/1.73sq m  Chronic Kidney Disease:   <60 mL/min/1.73sq m  Kidney failure:   <15 mL/min/1.73sq m              eGFR calculated using average adult body mass.  Additional eGFR calculator available at:        [email or web address]            GFR Staging 03/13/2020 NOT REPORTED   Final    WBC 03/13/2020 3.8  3.5 - 11.0 k/uL Final    RBC 03/13/2020 3.19* 4.5 - 5.9 m/uL Final    Hemoglobin 03/13/2020 9.7* 13.5 - 17.5 g/dL Final    Hematocrit 03/13/2020 28.5* 41 - 53 % Final    MCV 03/13/2020 89.4  80 - 100 fL Final    MCH 03/13/2020 30.5  26 - 34 pg Final    MCHC 03/13/2020 34.2  31 - 37 g/dL Final    RDW 03/13/2020 12.9  11.5 - 14.9 % Final    Platelets 02/39/8695 152  150 - 450 k/uL Final    MPV 03/13/2020 8.3  6.0 - 12.0 fL Final    NRBC Automated 03/13/2020 NOT REPORTED  per 100 WBC Final    Differential Type 03/13/2020 NOT REPORTED   Final    Immature Granulocytes 03/13/2020 NOT REPORTED  0 % Final    Absolute Immature Granulocyte 03/13/2020 NOT REPORTED  0.00 - 0.30 k/uL Final    WBC Morphology 03/13/2020 NOT REPORTED   Final    RBC Morphology 03/13/2020 NOT REPORTED   Final    Platelet Estimate 33/51/1800 NOT REPORTED   Final    Seg Neutrophils 03/13/2020 74* 36 - 66 % Final    Lymphocytes 03/13/2020 8* 24 - 44 % Final    Monocytes 03/13/2020 15* 1 - 7 % Final    Eosinophils % 03/13/2020 2  0 - 4 % Final    Basophils 03/13/2020 1  0 - 2 % Final    Segs Absolute 03/13/2020 2.81  1.3 - 9.1 k/uL Final    Absolute Lymph # 03/13/2020 0.30* 1.0 - 4.8 k/uL Final    Absolute Mono # 03/13/2020 0.57  0.1 - 1.3 k/uL Final    Absolute Eos # 03/13/2020 0.08  0.0 - 0.4 k/uL Final    Basophils Absolute 03/13/2020 0.04  0.0 - 0.2 k/uL Final    Morphology 03/13/2020 SLT ROULEAU   Final    Ventricular Rate 03/13/2020 86  BPM Final    Atrial Rate 03/13/2020 86  BPM Final    P-R Interval 03/13/2020 208  ms Final    QRS Duration 03/13/2020 92  ms Final    Q-T Interval 03/13/2020 394  ms Final    QTc Calculation (Bazett) 03/13/2020 471  ms Final    P Axis 03/13/2020 61  degrees Final    R Axis 03/13/2020 22  degrees Final    T Axis 03/13/2020 11  degrees Final    Ventricular Rate 03/13/2020 84  BPM Final    Atrial Rate 03/13/2020 84  BPM Final    P-R Interval 03/13/2020 192  ms Final    QRS Duration 03/13/2020 90  ms Final    Q-T Interval 03/13/2020 400  ms Final    QTc Calculation (Bazett) 03/13/2020 472  ms Final    P Axis 03/13/2020 76  degrees Final    R Axis 03/13/2020 26  degrees Final    T Axis 03/13/2020 43  degrees Final    Magnesium 03/13/2020 2.4  1.6 - 2.6 mg/dL Final    Troponin, High Sensitivity 03/13/2020 26* 0 - 22 ng/L Final    Comment:       High Sensitivity Troponin values cannot be compared with other Troponin methodologies. Patients with high levels of Biotin oral intake (i.e >5mg/day) may have falsely decreased   Troponin levels. Samples collected within 8 hours of biotin intake may require additional   information for diagnosis.  Troponin T 03/13/2020 NOT REPORTED  <0.03 ng/mL Final    Troponin Interp 03/13/2020 NOT REPORTED   Final    Myoglobin 03/13/2020 70  28 - 72 ng/mL Final    Troponin, High Sensitivity 03/13/2020 25* 0 - 22 ng/L Final    Comment:       High Sensitivity Troponin values cannot be compared with other Troponin methodologies. Patients with high levels of Biotin oral intake (i.e >5mg/day) may have falsely decreased   Troponin levels. Samples collected within 8 hours of biotin intake may require additional   information for diagnosis.  Troponin T 03/13/2020 NOT REPORTED  <0.03 ng/mL Final    Troponin Interp 03/13/2020 NOT REPORTED   Final    Myoglobin 03/13/2020 58  28 - 72 ng/mL Final       No results found for this visit on 03/23/20.

## 2020-03-23 NOTE — TELEPHONE ENCOUNTER
Acute respiratory issue     Patient complains of  Fatigue, cough, no appetitte, no fever, no sob, \"not too steady on my feet\"  Ankles are both swollen    Symptoms for how long 2-3 weeks    What meds has pt tried  Nothing but tylenol    Does patient have asthma  No    Is patient on inhalers  No    Is this sinus, cold or cough related  unsure    *This condition is eligible for an eVisit. If not already active, sign patient up for MyChart to improve access and communication with the provider. *

## 2020-03-23 NOTE — ED PROVIDER NOTES
discharge and redness. Respiratory: Positive for cough. Negative for shortness of breath. Cardiovascular: Negative for chest pain. Gastrointestinal: Negative for abdominal pain, constipation, diarrhea and vomiting. Genitourinary: Negative for dysuria and hematuria. Musculoskeletal: Negative for arthralgias. Skin: Negative for color change and rash. Neurological: Positive for weakness. Negative for syncope, numbness and headaches. Hematological: Negative for adenopathy. Psychiatric/Behavioral: Negative for confusion. The patient is not nervous/anxious. Except as noted above the remainder of the review of systems was reviewed and negative. PHYSICAL EXAM    (up to 7 for level 4, 8 or more for level 5)     Vitals:    03/23/20 1350 03/23/20 1530   BP: (!) 160/68    Pulse: 116    Resp: 18    Temp: 100.2 °F (37.9 °C) 99.8 °F (37.7 °C)   TempSrc: Oral Oral   SpO2: 100%    Weight: 173 lb (78.5 kg)    Height: 6' 2\" (1.88 m)        Physical Exam  Constitutional:       General: He is not in acute distress. Appearance: He is well-developed. He is not diaphoretic. HENT:      Head: Normocephalic and atraumatic. Eyes:      General: No scleral icterus. Right eye: No discharge. Left eye: No discharge. Neck:      Musculoskeletal: Neck supple. Cardiovascular:      Rate and Rhythm: Normal rate and regular rhythm. Pulmonary:      Effort: Pulmonary effort is normal. No respiratory distress. Breath sounds: Normal breath sounds. No stridor. No wheezing or rales. Abdominal:      General: There is no distension. Palpations: Abdomen is soft. Tenderness: There is no abdominal tenderness. Musculoskeletal: Normal range of motion. Right lower leg: Edema present. Left lower leg: Edema present. Lymphadenopathy:      Cervical: No cervical adenopathy. Skin:     General: Skin is warm and dry. Findings: No erythema or rash.    Neurological:      Mental

## 2020-03-23 NOTE — H&P
MG extended release tablet Take 1 tablet by mouth daily  Patient taking differently: Take 90 mg by mouth daily Pt states he takes daily 3/16/20   Ruben DO Miranda   Insulin Pen Needle ([employer] MINI PEN NEEDLES) 31G X 6 MM MISC PATIENT IS TESTING UP TO 5 TIMES DAILY 2/20/20   Ruben DO Miranda   FREESTYLE LITE strip USE TO TEST TWICE A DAY AS DIRECTED 11/10/19   Ruben Plsonia DO   glucose monitoring kit (FREESTYLE) monitoring kit Pt testing bid. True test glucometer 5/10/19   Claudell Faden, APRN - CNP   Insulin Pen Needle (COMFORT EZ PEN NEEDLES) 32G X 6 MM MISC USE AS DIRECTED WITH INSULIN PEN(S) UP TO 5 TIMES A DAY 10/19/18   Maryam López DO        Allergies:     Amino acids and Lisinopril    Social History:     Tobacco:    reports that he quit smoking about 6 years ago. His smoking use included cigarettes. He has a 25.00 pack-year smoking history. He has never used smokeless tobacco.  Alcohol:      reports no history of alcohol use. Drug Use:  reports previous drug use. Drug: Other-see comments. Family History:     Family History   Problem Relation Age of Onset    Cancer Mother     Cancer Father     Cancer Brother        Review of Systems:     Positive and Negative as described in HPI.     CONSTITUTIONAL: positive for fatigue and poor appetite;  negative for fevers, chills, sweats, weight loss  HEENT:  negative for vision, hearing changes, runny nose, throat pain  RESPIRATORY:  Positive for dry cough and worsening shortness of breath; negative for congestion, wheezing  CARDIOVASCULAR:  negative for chest pain, palpitations  GASTROINTESTINAL:  negative for nausea, vomiting, diarrhea, constipation, change in bowel habits, abdominal pain   GENITOURINARY:  negative for difficulty of urination, burning with urination, frequency   INTEGUMENT:  negative for rash, skin lesions, easy bruising   HEMATOLOGIC/LYMPHATIC:  negative for swelling/edema   ALLERGIC/IMMUNOLOGIC:  negative for urticaria ,

## 2020-03-24 ENCOUNTER — APPOINTMENT (OUTPATIENT)
Dept: CT IMAGING | Age: 70
DRG: 698 | End: 2020-03-24
Payer: COMMERCIAL

## 2020-03-24 ENCOUNTER — APPOINTMENT (OUTPATIENT)
Dept: ULTRASOUND IMAGING | Age: 70
DRG: 698 | End: 2020-03-24
Payer: COMMERCIAL

## 2020-03-24 PROBLEM — E44.0 MODERATE PROTEIN-CALORIE MALNUTRITION (HCC): Status: ACTIVE | Noted: 2020-03-24

## 2020-03-24 LAB
ABSOLUTE EOS #: 0.04 K/UL (ref 0–0.4)
ABSOLUTE IMMATURE GRANULOCYTE: 0 K/UL (ref 0–0.3)
ABSOLUTE LYMPH #: 0.36 K/UL (ref 1–4.8)
ABSOLUTE MONO #: 1.04 K/UL (ref 0.2–0.8)
ADENOVIRUS PCR: NOT DETECTED
ALBUMIN SERPL-MCNC: 3.4 G/DL (ref 3.5–5.2)
ALBUMIN/GLOBULIN RATIO: ABNORMAL (ref 1–2.5)
ALP BLD-CCNC: 111 U/L (ref 40–129)
ALT SERPL-CCNC: 11 U/L (ref 5–41)
ANION GAP SERPL CALCULATED.3IONS-SCNC: 11 MMOL/L (ref 9–17)
AST SERPL-CCNC: 17 U/L
BASOPHILS # BLD: 0 %
BASOPHILS ABSOLUTE: 0 K/UL (ref 0–0.2)
BILIRUB SERPL-MCNC: 0.46 MG/DL (ref 0.3–1.2)
BORDETELLA PARAPERTUSSIS: NOT DETECTED
BORDETELLA PERTUSSIS PCR: NOT DETECTED
BUN BLDV-MCNC: 31 MG/DL (ref 8–23)
BUN/CREAT BLD: 17 (ref 9–20)
CALCIUM SERPL-MCNC: 8.9 MG/DL (ref 8.6–10.4)
CHLAMYDIA PNEUMONIAE BY PCR: NOT DETECTED
CHLORIDE BLD-SCNC: 107 MMOL/L (ref 98–107)
CO2: 19 MMOL/L (ref 20–31)
CORONAVIRUS 229E PCR: NOT DETECTED
CORONAVIRUS HKU1 PCR: NOT DETECTED
CORONAVIRUS NL63 PCR: NOT DETECTED
CORONAVIRUS OC43 PCR: NOT DETECTED
CREAT SERPL-MCNC: 1.8 MG/DL (ref 0.7–1.2)
CREATININE URINE: 89.2 MG/DL (ref 39–259)
CULTURE: NO GROWTH
DIFFERENTIAL TYPE: ABNORMAL
EOSINOPHILS RELATIVE PERCENT: 1 % (ref 1–4)
GFR AFRICAN AMERICAN: 46 ML/MIN
GFR NON-AFRICAN AMERICAN: 38 ML/MIN
GFR SERPL CREATININE-BSD FRML MDRD: ABNORMAL ML/MIN/{1.73_M2}
GFR SERPL CREATININE-BSD FRML MDRD: ABNORMAL ML/MIN/{1.73_M2}
GLUCOSE BLD-MCNC: 123 MG/DL (ref 75–110)
GLUCOSE BLD-MCNC: 137 MG/DL (ref 70–99)
GLUCOSE BLD-MCNC: 151 MG/DL (ref 75–110)
GLUCOSE BLD-MCNC: 154 MG/DL (ref 75–110)
GLUCOSE BLD-MCNC: 159 MG/DL (ref 75–110)
GLUCOSE BLD-MCNC: 328 MG/DL (ref 75–110)
HCT VFR BLD CALC: 27.3 % (ref 40.7–50.3)
HEMOGLOBIN: 8.9 G/DL (ref 13–17)
HUMAN METAPNEUMOVIRUS PCR: NOT DETECTED
IMMATURE GRANULOCYTES: 0 %
INFLUENZA A BY PCR: NOT DETECTED
INFLUENZA A H1 (2009) PCR: NORMAL
INFLUENZA A H1 PCR: NORMAL
INFLUENZA A H3 PCR: NORMAL
INFLUENZA B BY PCR: NOT DETECTED
INR BLD: 1
LEGIONELLA PNEUMOPHILIA AG, URINE: NEGATIVE
LYMPHOCYTES # BLD: 9 % (ref 24–44)
Lab: NORMAL
MAGNESIUM: 2.1 MG/DL (ref 1.6–2.6)
MCH RBC QN AUTO: 29.7 PG (ref 25.2–33.5)
MCHC RBC AUTO-ENTMCNC: 32.6 G/DL (ref 28.4–34.8)
MCV RBC AUTO: 91 FL (ref 82.6–102.9)
MONOCYTES # BLD: 26 % (ref 1–7)
MYCOPLASMA PNEUMONIAE PCR: NOT DETECTED
NRBC AUTOMATED: 0 PER 100 WBC
PARAINFLUENZA 1 PCR: NOT DETECTED
PARAINFLUENZA 2 PCR: NOT DETECTED
PARAINFLUENZA 3 PCR: NOT DETECTED
PARAINFLUENZA 4 PCR: NOT DETECTED
PDW BLD-RTO: 11.8 % (ref 11.8–14.4)
PLATELET # BLD: 144 K/UL (ref 138–453)
PLATELET ESTIMATE: ABNORMAL
PMV BLD AUTO: 10.8 FL (ref 8.1–13.5)
POTASSIUM SERPL-SCNC: 4.5 MMOL/L (ref 3.7–5.3)
PROTHROMBIN TIME: 10.7 SEC (ref 9.7–11.6)
RBC # BLD: 3 M/UL (ref 4.21–5.77)
RBC # BLD: ABNORMAL 10*6/UL
RESP SYNCYTIAL VIRUS PCR: NOT DETECTED
RHINO/ENTEROVIRUS PCR: NOT DETECTED
SEG NEUTROPHILS: 64 % (ref 36–66)
SEGMENTED NEUTROPHILS ABSOLUTE COUNT: 2.56 K/UL (ref 1.8–7.7)
SODIUM BLD-SCNC: 137 MMOL/L (ref 135–144)
SODIUM,UR: 59 MMOL/L
SOURCE: NORMAL
SPECIMEN DESCRIPTION: NORMAL
SPECIMEN DESCRIPTION: NORMAL
STREP PNEUMONIAE ANTIGEN: NEGATIVE
TOTAL CK: 264 U/L (ref 39–308)
TOTAL PROTEIN, URINE: 184 MG/DL
TOTAL PROTEIN: 7.2 G/DL (ref 6.4–8.3)
WBC # BLD: 4 K/UL (ref 3.5–11.3)
WBC # BLD: ABNORMAL 10*3/UL

## 2020-03-24 PROCEDURE — 2580000003 HC RX 258: Performed by: INTERNAL MEDICINE

## 2020-03-24 PROCEDURE — 6370000000 HC RX 637 (ALT 250 FOR IP): Performed by: INTERNAL MEDICINE

## 2020-03-24 PROCEDURE — 36415 COLL VENOUS BLD VENIPUNCTURE: CPT

## 2020-03-24 PROCEDURE — 2060000000 HC ICU INTERMEDIATE R&B

## 2020-03-24 PROCEDURE — 82550 ASSAY OF CK (CPK): CPT

## 2020-03-24 PROCEDURE — 6360000002 HC RX W HCPCS: Performed by: INTERNAL MEDICINE

## 2020-03-24 PROCEDURE — 99222 1ST HOSP IP/OBS MODERATE 55: CPT | Performed by: INTERNAL MEDICINE

## 2020-03-24 PROCEDURE — 71250 CT THORAX DX C-: CPT

## 2020-03-24 PROCEDURE — 76770 US EXAM ABDO BACK WALL COMP: CPT

## 2020-03-24 PROCEDURE — 83735 ASSAY OF MAGNESIUM: CPT

## 2020-03-24 PROCEDURE — 80053 COMPREHEN METABOLIC PANEL: CPT

## 2020-03-24 PROCEDURE — 94761 N-INVAS EAR/PLS OXIMETRY MLT: CPT

## 2020-03-24 PROCEDURE — 85025 COMPLETE CBC W/AUTO DIFF WBC: CPT

## 2020-03-24 PROCEDURE — 85610 PROTHROMBIN TIME: CPT

## 2020-03-24 PROCEDURE — 99232 SBSQ HOSP IP/OBS MODERATE 35: CPT | Performed by: INTERNAL MEDICINE

## 2020-03-24 RX ORDER — HYDROXYCHLOROQUINE SULFATE 200 MG/1
400 TABLET, FILM COATED ORAL 2 TIMES DAILY
Status: COMPLETED | OUTPATIENT
Start: 2020-03-25 | End: 2020-03-25

## 2020-03-24 RX ORDER — INSULIN GLARGINE 100 [IU]/ML
16 INJECTION, SOLUTION SUBCUTANEOUS 2 TIMES DAILY
Status: DISCONTINUED | OUTPATIENT
Start: 2020-03-24 | End: 2020-03-25 | Stop reason: HOSPADM

## 2020-03-24 RX ORDER — EVEROLIMUS TABLETS 0.5 MG/1
0.5 TABLET ORAL 2 TIMES DAILY
Status: DISCONTINUED | OUTPATIENT
Start: 2020-03-24 | End: 2020-03-25 | Stop reason: SDUPTHER

## 2020-03-24 RX ORDER — HYDROXYCHLOROQUINE SULFATE 200 MG/1
200 TABLET, FILM COATED ORAL 2 TIMES DAILY
Status: DISCONTINUED | OUTPATIENT
Start: 2020-03-25 | End: 2020-03-25 | Stop reason: HOSPADM

## 2020-03-24 RX ORDER — HYDROXYCHLOROQUINE SULFATE 200 MG/1
400 TABLET, FILM COATED ORAL ONCE
Status: COMPLETED | OUTPATIENT
Start: 2020-03-24 | End: 2020-03-24

## 2020-03-24 RX ORDER — EVEROLIMUS 1 MG/1
2 TABLET ORAL 2 TIMES DAILY
Status: DISCONTINUED | OUTPATIENT
Start: 2020-03-24 | End: 2020-03-25 | Stop reason: SDUPTHER

## 2020-03-24 RX ORDER — TACROLIMUS 1 MG/1
7 CAPSULE ORAL 2 TIMES DAILY
Status: DISCONTINUED | OUTPATIENT
Start: 2020-03-24 | End: 2020-03-25 | Stop reason: HOSPADM

## 2020-03-24 RX ADMIN — INSULIN GLARGINE 10 UNITS: 100 INJECTION, SOLUTION SUBCUTANEOUS at 09:30

## 2020-03-24 RX ADMIN — INSULIN GLARGINE 10 UNITS: 100 INJECTION, SOLUTION SUBCUTANEOUS at 21:43

## 2020-03-24 RX ADMIN — NIFEDIPINE 90 MG: 90 TABLET, FILM COATED, EXTENDED RELEASE ORAL at 09:11

## 2020-03-24 RX ADMIN — INSULIN LISPRO 1 UNITS: 100 INJECTION, SOLUTION INTRAVENOUS; SUBCUTANEOUS at 21:43

## 2020-03-24 RX ADMIN — METOPROLOL SUCCINATE 25 MG: 25 TABLET, EXTENDED RELEASE ORAL at 09:11

## 2020-03-24 RX ADMIN — GABAPENTIN 100 MG: 100 CAPSULE ORAL at 09:11

## 2020-03-24 RX ADMIN — TACROLIMUS 7 MG: 1 CAPSULE ORAL at 21:18

## 2020-03-24 RX ADMIN — AZITHROMYCIN MONOHYDRATE 500 MG: 500 INJECTION, POWDER, LYOPHILIZED, FOR SOLUTION INTRAVENOUS at 21:22

## 2020-03-24 RX ADMIN — ACETAMINOPHEN 650 MG: 325 TABLET ORAL at 16:55

## 2020-03-24 RX ADMIN — SODIUM CHLORIDE: 9 INJECTION, SOLUTION INTRAVENOUS at 21:23

## 2020-03-24 RX ADMIN — TAMSULOSIN HYDROCHLORIDE 0.4 MG: 0.4 CAPSULE ORAL at 09:11

## 2020-03-24 RX ADMIN — TACROLIMUS 7 MG: 1 CAPSULE ORAL at 12:09

## 2020-03-24 RX ADMIN — SODIUM CHLORIDE, PRESERVATIVE FREE 10 ML: 5 INJECTION INTRAVENOUS at 00:03

## 2020-03-24 RX ADMIN — EVEROLIMUS 2 MG: 1 TABLET ORAL at 21:25

## 2020-03-24 RX ADMIN — ATORVASTATIN CALCIUM 40 MG: 40 TABLET, FILM COATED ORAL at 09:11

## 2020-03-24 RX ADMIN — EVEROLIMUS 2.5 MG: 1 TABLET ORAL at 12:09

## 2020-03-24 RX ADMIN — HYDROXYCHLOROQUINE SULFATE 400 MG: 200 TABLET ORAL at 21:18

## 2020-03-24 RX ADMIN — GABAPENTIN 100 MG: 100 CAPSULE ORAL at 21:18

## 2020-03-24 ASSESSMENT — PAIN SCALES - GENERAL
PAINLEVEL_OUTOF10: 0
PAINLEVEL_OUTOF10: 2
PAINLEVEL_OUTOF10: 2

## 2020-03-24 ASSESSMENT — PAIN DESCRIPTION - PAIN TYPE
TYPE: CHRONIC PAIN
TYPE: NEUROPATHIC PAIN

## 2020-03-24 ASSESSMENT — PAIN DESCRIPTION - LOCATION
LOCATION: GENERALIZED
LOCATION: GENERALIZED

## 2020-03-24 NOTE — PROGRESS NOTES
Constitutional:  negative for chills, fevers, sweats  Respiratory:  negative for wheezing  Cardiovascular:  negative for chest pain, chest pressure/discomfort, lower extremity edema, palpitations  Gastrointestinal:  negative for abdominal pain, constipation, diarrhea, nausea, vomiting  Neurological:  negative for dizziness, headache    Medications: Allergies: Allergies   Allergen Reactions    Amino Acids     Lisinopril        Current Meds:   Scheduled Meds:    tacrolimus  7 mg Oral BID    atorvastatin  40 mg Oral Daily    gabapentin  100 mg Oral BID    insulin glargine  10 Units Subcutaneous BID    metoprolol succinate  25 mg Oral Daily    NIFEdipine  90 mg Oral Daily    tamsulosin  0.4 mg Oral Daily    sodium chloride flush  10 mL Intravenous 2 times per day    insulin lispro  0-6 Units Subcutaneous TID WC    insulin lispro  0-3 Units Subcutaneous Nightly     Continuous Infusions:    sodium chloride 75 mL/hr at 03/23/20 2243    dextrose       PRN Meds: meclizine, acetaminophen **OR** acetaminophen, albuterol sulfate HFA, sodium chloride flush, promethazine **OR** ondansetron, glucose, dextrose, glucagon (rDNA), dextrose    Data:     Past Medical History:   has a past medical history of Diabetes mellitus ([facility] [address] Hemodialysis patient ([facility] Hepatitis C, Hepatitis C without hepatic coma, Hyperlipidemia, Hypertension, and Renal transplant recipient. Social History:   reports that he quit smoking about 6 years ago. His smoking use included cigarettes. He has a 25.00 pack-year smoking history. He has never used smokeless tobacco. He reports previous drug use. Drug: Other-see comments. He reports that he does not drink alcohol.      Family History:   Family History   Problem Relation Age of Onset   Candice Crimes Cancer Mother     Cancer Father     Cancer Brother        Vitals:  BP (!) 170/69   Pulse 100   Temp 99.2 °F (37.3 °C) (Oral)   Resp 21   Ht 6' 2\" (1.88 m)   Wt 156 lb 15.5 oz (71.2 kg)   SpO2 91%   BMI 20.15 kg/m²   Temp (24hrs), Av.3 °F (37.4 °C), Min:98.6 °F (37 °C), Max:100.2 °F (37.9 °C)    Recent Labs     20  0903   POCGLU 328* 151*       I/O (24Hr): Intake/Output Summary (Last 24 hours) at 3/24/2020 1049  Last data filed at 3/24/2020 0838  Gross per 24 hour   Intake 1128 ml   Output 1650 ml   Net -522 ml       Labs:  Hematology:  Recent Labs     20  14320  0558   WBC 5.1 4.0   RBC 3.14* 3.00*   HGB 9.1* 8.9*   HCT 28.4* 27.3*   MCV 90.4 91.0   MCH 29.0 29.7   MCHC 32.0 32.6   RDW 11.9 11.8    144   MPV 10.7 10.8   CRP 75.8*  --    INR 1.1 1.0     Chemistry:  Recent Labs     20  14320  0558   * 137   K 5.0 4.5   CL 98 107   CO2 21 19*   GLUCOSE 334* 137*   BUN 40* 31*   CREATININE 2.25* 1.80*   MG  --  2.1   ANIONGAP 14 11   LABGLOM 29* 38*   GFRAA 35* 46*   CALCIUM 9.4 8.9   CKTOTAL  --  264     Recent Labs     20  14320  0558 20  0903   PROT 7.7  --  7.2  --    LABALBU 4.0  --  3.4*  --    AST 17  --  17  --    ALT 12  --  11  --    *  --   --   --    ALKPHOS 123  --  111  --    BILITOT 0.59  --  0.46  --    URICACID 5.4  --   --   --    POCGLU  --  328*  --  151*     ABG:  Lab Results   Component Value Date    PH 8.0 2017     Lab Results   Component Value Date/Time    SPECIAL LT FA IV,12ML 2020 04:37 PM     Lab Results   Component Value Date/Time    CULTURE NO GROWTH 14 HOURS 2020 04:37 PM       Radiology:  Us Renal Complete    Result Date: 3/24/2020  1. Unremarkable appearance of transplanted kidney in the right lower quadrant with no evidence of obstructive uropathy. 2. Diminutive hyperechoic native kidneys bilaterally. Xr Chest Portable    Result Date: 3/23/2020  No acute cardiopulmonary pathology.        Physical Examination:        General appearance:  alert, cooperative and no distress  Mental Status:  oriented to person, place and time and normal

## 2020-03-24 NOTE — CARE COORDINATION
Case Management Initial Discharge Plan  Dano ,         Readmission Risk              Risk of Unplanned Readmission:        18             Met with:patient to discuss discharge plans. Information verified: address, contacts, phone number, , insurance Yes  PCP: Primitivo Will DO  Date of last visit: 3/23/2020    Insurance Provider: Jem Lovell    Discharge Planning  Current Residence:     Living Arrangements:  Sister  Home has 1 stories/3 stairs to climb  Support Systems:  Family Members  Current Services PTA: None   Supplier: None  Patient able to perform ADL's:Independent  DME used to aid ambulation prior to admission: None/during admission: None    Potential Assistance Needed:  None  Pharmacy: [facility]    Potential Assistance Purchasing Medications:  No  Does patient want to participate in local refill/ meds to beds program?  Not Assessed    Patient agreeable to home care: No  [place] of choice provided:  n/a      Type of Home Care Services:  None  Patient expects to be discharged to:  home    Prior SNF/Rehab Placement and Facility: None  Agreeable to SNF/Rehab: No  [place] of choice provided: n/a   Evaluation: no    Expected Discharge date:  20  Follow Up Appointment: Best Day/ Time: Monday PM    Transportation provider:   Transportation arrangements needed for discharge: No    Discharge Plan: Discharge planning completed with assistance of nurse. Pt has a history of kidney transplant in 2017. Sister assists with healthcare needs. He is independent at home. Denies any needs at this time. Will continue to follow for needs.         Electronically signed by Ann Rivera RN on 3/24/20 at 5:10 PM EDT

## 2020-03-24 NOTE — CONSULTS
patient [facility])     patient had Kidney transplant    Hepatitis C     Hepatitis C without hepatic coma     Hyperlipidemia     Hypertension     Renal transplant recipient        Past Surgical History:   Procedure Laterality Date    COLONOSCOPY N/A 10/30/2019    COLONOSCOPY WITH BIOPSY performed by Vee Morales MD at [address] [place] [address] Right 11/2015    at [facility] [id number] [facility]  2017       Prior to Admission medications    Medication Sig Start Date End Date Taking? Authorizing Provider   Everolimus (ZORTRESS PO) Take 175 mg by mouth 2 times daily  1/21/20  Yes Historical Provider, MD   atorvastatin (LIPITOR) 40 MG tablet TAKE 1 TABLET BY MOUTH DAILY 12/23/19  Yes Claude Mody, DO   magnesium oxide (MAG-OX) 400 MG tablet Take 1 tablet by mouth 2 times daily 12/23/19  Yes Claude Mody, DO   metoprolol succinate (TOPROL XL) 25 MG extended release tablet TAKE 1 TABLET BY MOUTH ONCE DAILY 12/23/19  Yes Claude Mody, DO   tamsulosin [facility]) 0.4 MG capsule Take 1 capsule by mouth daily 12/23/19  Yes Claude Mody, DO   gabapentin (NEURONTIN) 100 MG capsule Take 1 capsule by mouth 2 times daily for 90 days. 12/23/19 3/23/20 Yes Claude Mody, DO   Dulaglutide (TRULICITY) 9.20 PZ/7.4KV SOPN Inject 0.75 mg into the skin once a week 12/23/19  Yes Claude Mody, DO   insulin lispro (HUMALOG KWIKPEN) 100 UNIT/ML pen Per Sliding scale.  Max 28 units per day 9/16/19  Yes Maryam López DO   insulin glargine (LANTUS SOLOSTAR) 100 UNIT/ML injection pen Inject 18 Units into the skin 2 times daily  Patient taking differently: Inject 16 Units into the skin 2 times daily  8/15/19 8/14/20 Yes Maryam López DO   tacrolimus (PROGRAF) 1 MG capsule Take 7 mg by mouth 2 times daily    Yes Historical Provider, MD   NIFEdipine (PROCARDIA XL) 90 MG extended release tablet Take 1 tablet by mouth daily  Patient taking differently: Take 90 mg by mouth daily Pt states he takes daily 3/16/20   Parisa Acevedo, DO   Insulin Pen Needle [place] MINI PEN NEEDLES) 31G X 6 MM MISC PATIENT IS TESTING UP TO 5 TIMES DAILY 20   Parisa Acevedo, DO   FREESTYLE LITE strip USE TO TEST TWICE A DAY AS DIRECTED 11/10/19   Parisa Acevedo DO   glucose monitoring kit (FREESTYLE) monitoring kit Pt testing bid.  True test glucometer 5/10/19   BJORN Matthews CNP   Insulin Pen Needle (COMFORT EZ PEN NEEDLES) 32G X 6 MM MISC USE AS DIRECTED WITH INSULIN PEN(S) UP TO 5 TIMES A DAY 10/19/18   Kurt López DO       Scheduled Meds:   atorvastatin  40 mg Oral Daily    gabapentin  100 mg Oral BID    insulin glargine  10 Units Subcutaneous BID    metoprolol succinate  25 mg Oral Daily    NIFEdipine  90 mg Oral Daily    tamsulosin  0.4 mg Oral Daily    sodium chloride flush  10 mL Intravenous 2 times per day    insulin lispro  0-6 Units Subcutaneous TID WC    insulin lispro  0-3 Units Subcutaneous Nightly     Continuous Infusions:   sodium chloride 75 mL/hr at 20 2243    dextrose       PRN Meds:meclizine, acetaminophen **OR** acetaminophen, albuterol sulfate HFA, sodium chloride flush, promethazine **OR** ondansetron, glucose, dextrose, glucagon (rDNA), dextrose    Allergies   Allergen Reactions    Amino Acids     Lisinopril        Social History     Socioeconomic History    Marital status:      Spouse name: Not on file    Number of children: Not on file    Years of education: Not on file    Highest education level: Not on file   Occupational History    Not on file   Social Needs    Financial resource strain: Somewhat hard    Food insecurity     Worry: Often true     Inability: Often true    Transportation needs     Medical: Yes     Non-medical: Yes   Tobacco Use    Smoking status: Former Smoker     Packs/day: 0.50     Years: 50.00     Pack years: 25.00     Types: Cigarettes     Last attempt to quit: 2013     Years since quittin.9    Smokeless

## 2020-03-24 NOTE — CONSULTS
[facility]  Initial Consult Note  Date: 3/24/2020    Hospital day :1     Impression:   1. Acute kidney injury on chronic kidney disease  2. History of renal transplant on immunosuppressive therapy  3. Febrile illness-unclear etiology  4. Chronic cough -for at least 4 weeks now  5. Diabetes mellitus type 2    Recommendations   · Clinically there are no localizing findings to suggest infection but the patient did have a fever and is an immunocompromised host.  · Chest x-ray does not show any infiltrates and clinical exam with no rales appreciated. · It is not clear to me that this would represent a coronavirus infection especially given the chronicity of his symptoms  · Blood cultures x2 sets have been sent. · I will order CT imaging of the chest to better delineate the pulmonary findings especially in this immunocompromised patient. · I will follow his progress and hold off on any systemic antimicrobial therapy at this time    Chief complaint/reason for consultation:   Febrile illness, immunocompromised host    History of Present Illness:   Carmella Young is a 72y.o.-year-old male who was initially admitted on 3/23/2020. Helen Samayoa has a history of diabetes mellitus type 2, hypertension, hepatitis C virus infection, hyperlipidemia, cadaveric renal transplant in 2017 for which he is on immunosuppressive therapy with tacrolimus. The patient reports that for the past month he has had a mostly dry/nonproductive cough, generalized malaise/fatigue, he has also had some associated headaches and over the last 2 weeks has had a loss of appetite. He does not report any subjective fevers or chills. No abdominal pain nausea vomiting but has been constipated. He ended up going to see his primary care physician who referred him to come into the emergency room. He does not know of any known sick contacts and he lives at home with his sister.   In triage the patient was noted to have fever and x-ray imaging showed clear chest x-ray and lab studies showed acute renal failure on chronic kidney disease. There was concern for a respiratory viral illness given the lymphopenia, low procalcitonin, elevated ferritin, LDH, CRP. The patient was admitted and is currently on O2 by nasal cannula. Been asked to evaluate due to concern for novel coronavirus infection. I have personally reviewed the past medical history, past surgical history, medications, social history, and family history, and I have updated the database accordingly.   Past Medical History:     Past Medical History:   Diagnosis Date    Diabetes mellitus ([facility] [address]     Hemodialysis patient [facility])     patient had Kidney transplant    Hepatitis C     Hepatitis C without hepatic coma     Hyperlipidemia     Hypertension     Renal transplant recipient      Past Surgical  History:     Past Surgical History:   Procedure Laterality Date    COLONOSCOPY N/A 10/30/2019    COLONOSCOPY WITH BIOPSY performed by Roxanne Casanova MD at [address] [place] [address] 11/2015    at [address]  2017     Medications:      tacrolimus  7 mg Oral BID    Everolimus  2 mg Oral BID    Or    Everolimus  0.5 mg Oral BID    insulin glargine  16 Units Subcutaneous BID    atorvastatin  40 mg Oral Daily    gabapentin  100 mg Oral BID    metoprolol succinate  25 mg Oral Daily    NIFEdipine  90 mg Oral Daily    tamsulosin  0.4 mg Oral Daily    sodium chloride flush  10 mL Intravenous 2 times per day    insulin lispro  0-6 Units Subcutaneous TID WC    insulin lispro  0-3 Units Subcutaneous Nightly     Social History:     Social History     Socioeconomic History    Marital status:      Spouse name: Not on file    Number of children: Not on file    Years of education: Not on file    Highest education level: Not on file   Occupational History    Not on file   Social Needs    Financial resource 1 [[id number]] Collected: 03/23/20 1635   Order Status: Completed Specimen: Blood Updated: 03/24/20 1504    Specimen Description . BLOOD    Special Requests LT AC,12ML    Culture NO GROWTH 20 HOURS   Culture, Blood 1 [[id number]] Collected: 03/23/20 1637   Order Status: Completed Specimen: Blood Updated: 03/24/20 1504    Specimen Description . BLOOD    Special Requests LT FA IV,12ML    Culture NO GROWTH 20 HOURS   Respiratory Virus PCR Panel [[id number]] Collected: 03/23/20 2213   Order Status: Completed Specimen: Nasopharyngeal Swab Updated: 03/24/20 0247    Specimen Description . NASOPHARYNGEAL SWAB    Adenovirus PCR Not Detected    Coronavirus 229E PCR Not Detected    Comment: Coronoviruses detected by this panel are those associated with the clinical common cold. This test will not detect 2019-SARS-COV-2 or other novel coronaviruses. Coronavirus HKU1 PCR Not Detected    Comment: Coronoviruses detected by this panel are those associated with the clinical common cold. This test will not detect 2019-SARS-COV-2 or other novel coronaviruses. Coronavirus NL63 PCR Not Detected    Comment: Coronoviruses detected by this panel are those associated with the clinical common cold. This test will not detect 2019-SARS-COV-2 or other novel coronaviruses. Coronavirus OC43 PCR Not Detected    Comment: Coronoviruses detected by this panel are those associated with the clinical common cold. This test will not detect 2019-SARS-COV-2 or other novel coronaviruses.         Human Metapneumovirus PCR Not Detected    Rhino/Enterovirus PCR Not Detected    Influenza A by PCR Not Detected    Influenza A H1 PCR NOT REPORTED    Influenza A H1 (2009) PCR NOT REPORTED    Influenza A H3 PCR NOT REPORTED    Influenza B by PCR Not Detected    Parainfluenza 1 PCR Not Detected    Parainfluenza 2 PCR Not Detected    Parainfluenza 3 PCR Not Detected    Parainfluenza 4 PCR Not Detected    Resp Syncytial Virus PCR Not Detected Bordetella Parapertussis Not Detected    B Pertussis by PCR Not Detected    Chlamydia pneumoniae By PCR Not Detected    Mycoplasma pneumo by PCR Not Detected    Comment: Performed by multiplexed nucleic acid assay. Strep Pneumoniae Antigen [[id number]] Collected: 03/23/20 2243   Order Status: Completed Specimen: Urine, clean catch Updated: 03/24/20 0215    Source . URINE    Strep pneumo Ag NEGATIVE    Comment: Strep pneumoniae antigen not detected      Legionella antigen, urine [[id number]] Collected: 03/23/20 2151   Order Status: Completed Specimen: Urine, clean catch Updated: 03/24/20 0215    Legionella Pneumophilia Ag, Urine NEGATIVE    Comment: L. pneumophila serogroup 1 antigen not detected. A negative result does not exclude infection with Leginella pnemophila serogroup 1 nor does   it rule out other microbial-caused respiratory infections of disease caused by other   serogroups of Legionella pneumophila. COVID-19 [[id number]] Collected: 03/23/20 2235   Order Status: Sent Specimen: Nasopharynx/Oropharynx Updated: 03/23/20 2315           Thank you for allowing us to participate in the care of this patient. Please call with questions. Electronically signed by Jaja Gaines MD on 3/24/2020 at 3:18 PM      [facility]  Jaja Gaines MD  [employer] messaging  OFFICE: [phone]      This note is created with the assistance of a speech recognition program.  While intending to generate a document that actually reflects the content of the visit, the document can still have some errors including those of syntax and sound a like substitutions which may escape proof reading. In such instances, actual meaning can be extrapolated by contextual diversion.

## 2020-03-24 NOTE — PROGRESS NOTES
Pt assessment completed. Pt without complaints. Respirations easy.  Updated on plans for the day call light within reach

## 2020-03-25 VITALS
SYSTOLIC BLOOD PRESSURE: 157 MMHG | BODY MASS INDEX: 20.14 KG/M2 | HEIGHT: 74 IN | OXYGEN SATURATION: 96 % | DIASTOLIC BLOOD PRESSURE: 98 MMHG | HEART RATE: 86 BPM | WEIGHT: 156.97 LBS | RESPIRATION RATE: 19 BRPM | TEMPERATURE: 98.8 F

## 2020-03-25 LAB
ABSOLUTE EOS #: 0.03 K/UL (ref 0–0.4)
ABSOLUTE IMMATURE GRANULOCYTE: 0 K/UL (ref 0–0.3)
ABSOLUTE LYMPH #: 0.49 K/UL (ref 1–4.8)
ABSOLUTE MONO #: 1.36 K/UL (ref 0.2–0.8)
ALBUMIN SERPL-MCNC: 3 G/DL (ref 3.5–5.2)
ANION GAP SERPL CALCULATED.3IONS-SCNC: 14 MMOL/L (ref 9–17)
BASOPHILS # BLD: 0 %
BASOPHILS ABSOLUTE: 0 K/UL (ref 0–0.2)
BUN BLDV-MCNC: 24 MG/DL (ref 8–23)
BUN/CREAT BLD: 13 (ref 9–20)
CALCIUM SERPL-MCNC: 8.1 MG/DL (ref 8.6–10.4)
CHLORIDE BLD-SCNC: 106 MMOL/L (ref 98–107)
CO2: 19 MMOL/L (ref 20–31)
CREAT SERPL-MCNC: 1.91 MG/DL (ref 0.7–1.2)
DIFFERENTIAL TYPE: ABNORMAL
EOSINOPHILS RELATIVE PERCENT: 1 % (ref 1–4)
GFR AFRICAN AMERICAN: 43 ML/MIN
GFR NON-AFRICAN AMERICAN: 35 ML/MIN
GFR SERPL CREATININE-BSD FRML MDRD: ABNORMAL ML/MIN/{1.73_M2}
GFR SERPL CREATININE-BSD FRML MDRD: ABNORMAL ML/MIN/{1.73_M2}
GLUCOSE BLD-MCNC: 119 MG/DL (ref 75–110)
GLUCOSE BLD-MCNC: 53 MG/DL (ref 75–110)
GLUCOSE BLD-MCNC: 67 MG/DL (ref 75–110)
GLUCOSE BLD-MCNC: 85 MG/DL (ref 70–99)
GLUCOSE BLD-MCNC: 92 MG/DL (ref 75–110)
HCT VFR BLD CALC: 24.7 % (ref 40.7–50.3)
HEMOGLOBIN: 7.9 G/DL (ref 13–17)
IMMATURE GRANULOCYTES: 0 %
LYMPHOCYTES # BLD: 17 % (ref 24–44)
MCH RBC QN AUTO: 29.5 PG (ref 25.2–33.5)
MCHC RBC AUTO-ENTMCNC: 32 G/DL (ref 28.4–34.8)
MCV RBC AUTO: 92.2 FL (ref 82.6–102.9)
MONOCYTES # BLD: 47 % (ref 1–7)
NRBC AUTOMATED: 0 PER 100 WBC
PDW BLD-RTO: 11.9 % (ref 11.8–14.4)
PHOSPHORUS: 3.1 MG/DL (ref 2.5–4.5)
PLATELET # BLD: 135 K/UL (ref 138–453)
PLATELET ESTIMATE: ABNORMAL
PMV BLD AUTO: 10.3 FL (ref 8.1–13.5)
POTASSIUM SERPL-SCNC: 3.9 MMOL/L (ref 3.7–5.3)
RBC # BLD: 2.68 M/UL (ref 4.21–5.77)
RBC # BLD: ABNORMAL 10*6/UL
SEG NEUTROPHILS: 35 % (ref 36–66)
SEGMENTED NEUTROPHILS ABSOLUTE COUNT: 1.02 K/UL (ref 1.8–7.7)
SODIUM BLD-SCNC: 139 MMOL/L (ref 135–144)
WBC # BLD: 2.9 K/UL (ref 3.5–11.3)
WBC # BLD: ABNORMAL 10*3/UL

## 2020-03-25 PROCEDURE — 85025 COMPLETE CBC W/AUTO DIFF WBC: CPT

## 2020-03-25 PROCEDURE — 6370000000 HC RX 637 (ALT 250 FOR IP): Performed by: INTERNAL MEDICINE

## 2020-03-25 PROCEDURE — 6360000002 HC RX W HCPCS: Performed by: INTERNAL MEDICINE

## 2020-03-25 PROCEDURE — 99239 HOSP IP/OBS DSCHRG MGMT >30: CPT | Performed by: INTERNAL MEDICINE

## 2020-03-25 PROCEDURE — 82947 ASSAY GLUCOSE BLOOD QUANT: CPT

## 2020-03-25 PROCEDURE — 36415 COLL VENOUS BLD VENIPUNCTURE: CPT

## 2020-03-25 PROCEDURE — 99232 SBSQ HOSP IP/OBS MODERATE 35: CPT | Performed by: INTERNAL MEDICINE

## 2020-03-25 PROCEDURE — 80069 RENAL FUNCTION PANEL: CPT

## 2020-03-25 RX ORDER — EVEROLIMUS TABLETS 0.5 MG/1
0.5 TABLET ORAL 2 TIMES DAILY
Status: DISCONTINUED | OUTPATIENT
Start: 2020-03-25 | End: 2020-03-25 | Stop reason: HOSPADM

## 2020-03-25 RX ORDER — AZITHROMYCIN 500 MG/1
500 TABLET, FILM COATED ORAL DAILY
Qty: 4 TABLET | Refills: 0 | Status: SHIPPED | OUTPATIENT
Start: 2020-03-25 | End: 2020-03-29

## 2020-03-25 RX ORDER — EVEROLIMUS 1 MG/1
2 TABLET ORAL 2 TIMES DAILY
Status: DISCONTINUED | OUTPATIENT
Start: 2020-03-25 | End: 2020-03-25 | Stop reason: HOSPADM

## 2020-03-25 RX ORDER — CLONIDINE HYDROCHLORIDE 0.1 MG/1
0.1 TABLET ORAL ONCE
Status: COMPLETED | OUTPATIENT
Start: 2020-03-25 | End: 2020-03-25

## 2020-03-25 RX ORDER — HYDROXYCHLOROQUINE SULFATE 200 MG/1
200 TABLET, FILM COATED ORAL 2 TIMES DAILY
Qty: 8 TABLET | Refills: 0 | Status: SHIPPED | OUTPATIENT
Start: 2020-03-25 | End: 2020-03-29

## 2020-03-25 RX ADMIN — TACROLIMUS 7 MG: 1 CAPSULE ORAL at 09:58

## 2020-03-25 RX ADMIN — TAMSULOSIN HYDROCHLORIDE 0.4 MG: 0.4 CAPSULE ORAL at 08:45

## 2020-03-25 RX ADMIN — GABAPENTIN 100 MG: 100 CAPSULE ORAL at 08:45

## 2020-03-25 RX ADMIN — METOPROLOL SUCCINATE 25 MG: 25 TABLET, EXTENDED RELEASE ORAL at 08:53

## 2020-03-25 RX ADMIN — NIFEDIPINE 90 MG: 90 TABLET, FILM COATED, EXTENDED RELEASE ORAL at 08:45

## 2020-03-25 RX ADMIN — ACETAMINOPHEN 650 MG: 325 TABLET ORAL at 16:26

## 2020-03-25 RX ADMIN — EVEROLIMUS 2.5 MG: 1 TABLET ORAL at 08:45

## 2020-03-25 RX ADMIN — INSULIN GLARGINE 16 UNITS: 100 INJECTION, SOLUTION SUBCUTANEOUS at 08:49

## 2020-03-25 RX ADMIN — CLONIDINE HYDROCHLORIDE 0.1 MG: 0.1 TABLET ORAL at 16:26

## 2020-03-25 RX ADMIN — DEXTROSE 15 G: 15 GEL ORAL at 11:26

## 2020-03-25 RX ADMIN — ATORVASTATIN CALCIUM 40 MG: 40 TABLET, FILM COATED ORAL at 08:46

## 2020-03-25 RX ADMIN — ACETAMINOPHEN 650 MG: 325 TABLET ORAL at 03:47

## 2020-03-25 RX ADMIN — HYDROXYCHLOROQUINE SULFATE 400 MG: 200 TABLET ORAL at 08:46

## 2020-03-25 ASSESSMENT — ENCOUNTER SYMPTOMS
ALLERGIC/IMMUNOLOGIC NEGATIVE: 1
VOMITING: 1
RESPIRATORY NEGATIVE: 1

## 2020-03-25 ASSESSMENT — PAIN SCALES - GENERAL
PAINLEVEL_OUTOF10: 0
PAINLEVEL_OUTOF10: 5
PAINLEVEL_OUTOF10: 6

## 2020-03-25 NOTE — PLAN OF CARE
Nutrition Problem:  Moderate malnutrition, In context of acute illness or injury  Intervention: Food and/or Nutrient Delivery: Continue current diet, Start ONS  Nutritional Goals: PO intakes to meet >75% energy and protein needs; weight maintenance
Problem: Falls - Risk of:  Goal: Will remain free from falls  Description: Will remain free from falls. Fall risk assessment completed. Patient instructed to use call light. Bed locked and in lowest position, side rails up 2/4, call light and bedside table within reach, clutter removed, and non-skid footwear on when pt out of bed. Hourly rounds will continue. 3/25/2020 1116 by oLri Lepe RN  Outcome: Ongoing     Problem: Pain:  Goal: Pain level will decrease  Description: Pain level will decrease. Pain assessment completed. Patient demonstrates understanding of pain rating scale and interventions. At this time patient states he has no pain. Will continue to monitor and reassess with each rounding and PRN.     Outcome: Ongoing     Problem: Breathing Pattern - Ineffective:  Goal: Ability to achieve and maintain a regular respiratory rate will improve  Description: Ability to achieve and maintain a regular respiratory rate will improve  Outcome: Ongoing
Lesia Howard RN  Outcome: Ongoing  Note: Assess for adventitious breath sounds. Monitored SaO2 > 90%. Applied 02 per nasal cannula as needed. Elevated HOB to improve breathing as needed.      3/24/2020 1746 by Suzanne Marks, RN  Outcome: Ongoing  3/24/2020 1420 by Suzanne Marks, RN  Outcome: Ongoing  Note: Cv assessment every 8 hours

## 2020-03-25 NOTE — PROGRESS NOTES
Patients IV infiltrated from previous shift. IV removed. Catheter in tip. No complications. Writer attempted 1x IV attempt with no success. Patient states that it typically takes 5-6 tries before someone is able to obtain IV access. Writer spoke with Dr. Sorensen and there is high chance of patient being discharged today. Verbal okay to leave IV out for now. Will continue to monitor.

## 2020-03-25 NOTE — PROGRESS NOTES
eaten today. No leukocytosis noted. He will be admitted for COVID-19 rule out. \"    Review of Systems:     Constitutional:  negative for chills, fevers, sweats  Respiratory:  negative for wheezing  Cardiovascular:  negative for chest pain, chest pressure/discomfort, lower extremity edema, palpitations  Gastrointestinal:  negative for abdominal pain, constipation, diarrhea, nausea  Neurological:  negative for dizziness, headache    Medications: Allergies: Allergies   Allergen Reactions    Amino Acids     Lisinopril        Current Meds:   Scheduled Meds:    Everolimus  2 mg Oral BID    And    Everolimus  0.5 mg Oral BID    tacrolimus  7 mg Oral BID    insulin glargine  16 Units Subcutaneous BID    azithromycin  500 mg Intravenous Q24H    hydroxychloroquine  200 mg Oral BID    atorvastatin  40 mg Oral Daily    gabapentin  100 mg Oral BID    metoprolol succinate  25 mg Oral Daily    NIFEdipine  90 mg Oral Daily    tamsulosin  0.4 mg Oral Daily    sodium chloride flush  10 mL Intravenous 2 times per day    insulin lispro  0-6 Units Subcutaneous TID WC    insulin lispro  0-3 Units Subcutaneous Nightly     Continuous Infusions:    sodium chloride 75 mL/hr at 03/24/20 2123    dextrose       PRN Meds: meclizine, acetaminophen **OR** acetaminophen, albuterol sulfate HFA, sodium chloride flush, promethazine **OR** ondansetron, glucose, dextrose, glucagon (rDNA), dextrose    Data:     Past Medical History:   has a past medical history of Diabetes mellitus ([facility] [address] Hemodialysis patient ([facility] [address] Hepatitis C, Hepatitis C without hepatic coma, Hyperlipidemia, Hypertension, and Renal transplant recipient. Social History:   reports that he quit smoking about 6 years ago. His smoking use included cigarettes. He has a 25.00 pack-year smoking history. He has never used smokeless tobacco. He reports previous drug use. Drug: Other-see comments. He reports that he does not drink alcohol.      Family History:   Family History   Problem Relation Age of Onset    Cancer Mother     Cancer Father     Cancer Brother        Vitals:  BP (!) 162/86   Pulse 86   Temp 98.1 °F (36.7 °C) (Tympanic)   Resp 18   Ht 6' 2\" (1.88 m)   Wt 156 lb 15.5 oz (71.2 kg)   SpO2 98%   BMI 20.15 kg/m²   Temp (24hrs), Av.2 °F (37.3 °C), Min:98.1 °F (36.7 °C), Max:101.2 °F (38.4 °C)    Recent Labs     20  0838 20  0904 20  1123 20  1214   POCGLU 53* 92 67* 119*       I/O (24Hr):     Intake/Output Summary (Last 24 hours) at 3/25/2020 1235  Last data filed at 3/25/2020 1108  Gross per 24 hour   Intake 1383.6 ml   Output 1400 ml   Net -16.4 ml       Labs:  Hematology:  Recent Labs     20  1430 20  0558 20  0456   WBC 5.1 4.0 2.9*   RBC 3.14* 3.00* 2.68*   HGB 9.1* 8.9* 7.9*   HCT 28.4* 27.3* 24.7*   MCV 90.4 91.0 92.2   MCH 29.0 29.7 29.5   MCHC 32.0 32.6 32.0   RDW 11.9 11.8 11.9    144 135*   MPV 10.7 10.8 10.3   CRP 75.8*  --   --    INR 1.1 1.0  --      Chemistry:  Recent Labs     20  1430 20  0558 20  0456   * 137 139   K 5.0 4.5 3.9   CL 98 107 106   CO2 21 19* 19*   GLUCOSE 334* 137* 85   BUN 40* 31* 24*   CREATININE 2.25* 1.80* 1.91*   MG  --  2.1  --    ANIONGAP 14 11 14   LABGLOM 29* 38* 35*   GFRAA 35* 46* 43*   CALCIUM 9.4 8.9 8.1*   PHOS  --   --  3.1   CKTOTAL  --  264  --      Recent Labs     20  1430  20  0558  20  1646 20  2110 20  0456 20  0838 20  0904 20  1123 20  1214   PROT 7.7  --  7.2  --   --   --   --   --   --   --   --    LABALBU 4.0  --  3.4*  --   --   --  3.0*  --   --   --   --    AST 17  --  17  --   --   --   --   --   --   --   --    ALT 12  --  11  --   --   --   --   --   --   --   --    *  --   --   --   --   --   --   --   --   --   --    ALKPHOS 123  --  111  --   --   --   --   --   --   --   --    BILITOT 0.59  --  0.46  --   --   --   --   --   --   --   --    URICACID 5.4  -- --   --   --   --   --   --   --   --   --    POCGLU  --    < >  --    < > 154* 159*  --  53* 92 67* 119*    < > = values in this interval not displayed. ABG:  Lab Results   Component Value Date    PH 8.0 08/19/2017     Lab Results   Component Value Date/Time    SPECIAL NOT REPORTED 03/23/2020 05:15 PM     Lab Results   Component Value Date/Time    CULTURE NO GROWTH 03/23/2020 05:15 PM       Radiology:  Ct Chest Wo Contrast    Result Date: 3/24/2020  Multiple small parenchymal nodules are noted, as described. Scattered multifocal ground-glass attenuation, greatest in the lower lobes. This is a nonspecific imaging pattern which can be seen in immunocompromised patients and pneumocystis pneumonia. However a atypical viral infection is possible Numerous nonspecific small nodular densities are noted bilaterally. These are likely due to inflammatory/infectious nodules. Continued follow-up recommended. Critical results were called by Dr. Duke Ferrer to [address] [place] [address] on 3/24/2020 at 17:25. Us Renal Complete    Result Date: 3/24/2020  1. Unremarkable appearance of transplanted kidney in the right lower quadrant with no evidence of obstructive uropathy. 2. Diminutive hyperechoic native kidneys bilaterally. Xr Chest Portable    Result Date: 3/23/2020  No acute cardiopulmonary pathology.        Physical Examination:        General appearance:  alert, cooperative and no distress  Mental Status:  oriented to person, place and time and normal affect  Lungs:  clear to auscultation bilaterally, normal effort  Heart:  regular rate and rhythm, no murmur  Abdomen:  soft, nontender, nondistended, normal bowel sounds, no masses, hepatomegaly, splenomegaly  Extremities:  no edema, redness, tenderness in the calves  Skin:  no gross lesions, rashes, induration    Assessment:        Hospital Problems           Last Modified POA    * (Principal) Acute renal failure (ARF) ([facility] 3/24/2020 Yes    Immunosuppressed status ([facility] 3/23/2020 Yes    Elevated C-reactive protein (CRP) 3/23/2020 Yes    Elevated LDH 3/23/2020 Yes    Normochromic normocytic anemia 3/23/2020 Yes    Controlled type 2 diabetes mellitus with stage 2 chronic kidney disease, with long-term current use of insulin ([facility] 3/23/2020 Yes    Essential hypertension 3/23/2020 Yes    History of renal transplant 3/23/2020 Yes    Hyperlipidemia 3/23/2020 Yes    Moderate protein-calorie malnutrition ([facility] 3/24/2020 Yes          Plan:         Will d/w ID; likely dc home as he is not hypoxic, has no labored breathing, is just febrile    Trinna Blades Blood, DO  3/25/2020  12:35 PM

## 2020-03-25 NOTE — PROGRESS NOTES
physician who referred him to come into the emergency room. He does not know of any known sick contacts and he lives at home with his sister. In triage the patient was noted to have fever and x-ray imaging showed clear chest x-ray and lab studies showed acute renal failure on chronic kidney disease. There was concern for a respiratory viral illness given the lymphopenia, low procalcitonin, elevated ferritin, LDH, CRP. The patient was admitted and is currently on O2 by nasal cannula. Been asked to evaluate due to concern for novel coronavirus infection    Current evaluation:3/25/2020    BP (!) 164/87   Pulse 84   Temp 98.1 °F (36.7 °C) (Tympanic)   Resp 20   Ht 6' 2\" (1.88 m)   Wt 156 lb 15.5 oz (71.2 kg)   SpO2 100%   BMI 20.15 kg/m²     Temperature Range: Temp: 98.1 °F (36.7 °C) Temp  Av.2 °F (37.3 °C)  Min: 98.1 °F (36.7 °C)  Max: 101.2 °F (38.4 °C)  The patient is seen and evaluated at bedside he is awake and alert in no acute distress. He did have some fever last evening. He has not had any fevers today though he did have some emesis earlier today. The patient reports that he always has emesis when he eats fresh fruit which he had some this morning. Review of Systems   Constitutional: Negative. Respiratory: Negative. Cardiovascular: Negative. Gastrointestinal: Positive for vomiting. Genitourinary: Negative. Musculoskeletal: Negative. Allergic/Immunologic: Negative. Neurological: Negative. Physical Examination :     Physical Exam  Constitutional:       Appearance: He is well-developed. HENT:      Head: Normocephalic and atraumatic. Neck:      Musculoskeletal: Normal range of motion and neck supple. Cardiovascular:      Rate and Rhythm: Normal rate. Heart sounds: Normal heart sounds. No friction rub. No gallop. Pulmonary:      Effort: Pulmonary effort is normal.      Breath sounds: Normal breath sounds. No wheezing.    Abdominal:      General: Bowel Parainfluenza 1 PCR Not Detected    Parainfluenza 2 PCR Not Detected    Parainfluenza 3 PCR Not Detected    Parainfluenza 4 PCR Not Detected    Resp Syncytial Virus PCR Not Detected    Bordetella Parapertussis Not Detected    B Pertussis by PCR Not Detected    Chlamydia pneumoniae By PCR Not Detected    Mycoplasma pneumo by PCR Not Detected    Comment: Performed by multiplexed nucleic acid assay. Strep Pneumoniae Antigen [[id number]] Collected: 03/23/20 2243   Order Status: Completed Specimen: Urine, clean catch Updated: 03/24/20 0215    Source . URINE    Strep pneumo Ag NEGATIVE    Comment: Strep pneumoniae antigen not detected      Legionella antigen, urine [[id number]] Collected: 03/23/20 2151   Order Status: Completed Specimen: Urine, clean catch Updated: 03/24/20 0215    Legionella Pneumophilia Ag, Urine NEGATIVE    Comment: L. pneumophila serogroup 1 antigen not detected. A negative result does not exclude infection with Leginella pnemophila serogroup 1 nor does   it rule out other microbial-caused respiratory infections of disease caused by other   serogroups of Legionella pneumophila.              Medications:      Everolimus  2 mg Oral BID    And    Everolimus  0.5 mg Oral BID    tacrolimus  7 mg Oral BID    insulin glargine  16 Units Subcutaneous BID    azithromycin  500 mg Intravenous Q24H    hydroxychloroquine  200 mg Oral BID    atorvastatin  40 mg Oral Daily    gabapentin  100 mg Oral BID    metoprolol succinate  25 mg Oral Daily    NIFEdipine  90 mg Oral Daily    tamsulosin  0.4 mg Oral Daily    sodium chloride flush  10 mL Intravenous 2 times per day    insulin lispro  0-6 Units Subcutaneous TID WC    insulin lispro  0-3 Units Subcutaneous Nightly           [facility]  Bette Irene MD  [employer] messaging  OFFICE: [phone]      Electronically signed by Bette Irene MD on 3/25/2020 at 11:34 AM  Thank you for allowing us to participate in

## 2020-03-25 NOTE — PROGRESS NOTES
[P.O.:400; I.V.:983.6]  Out: 1600 [Urine:1600]    PHYSICAL EXAM:  Larry Garcia is an elderly -American gentleman awake alert in no acute distress. Mild resting tremor. Heart tones are regular with rates in the 90s. Chest is diminished but clear. He has a soft nontender abdomen. His transplant is firm and in the right lower quadrant. He does not have significant pretibial edema.   Right brachio-basilic fistula is patent  Data:    CBC:   Lab Results   Component Value Date    WBC 2.9 (L) 03/25/2020    HGB 7.9 (L) 03/25/2020    HCT 24.7 (L) 03/25/2020    MCV 92.2 03/25/2020     (L) 03/25/2020     BMP:    Lab Results   Component Value Date     03/25/2020     03/24/2020     (L) 03/23/2020    K 3.9 03/25/2020    K 4.5 03/24/2020    K 5.0 03/23/2020     03/25/2020     03/24/2020    CL 98 03/23/2020    CO2 19 (L) 03/25/2020    CO2 19 (L) 03/24/2020    CO2 21 03/23/2020    BUN 24 (H) 03/25/2020    BUN 31 (H) 03/24/2020    BUN 40 (H) 03/23/2020    CREATININE 1.91 (H) 03/25/2020    CREATININE 1.80 (H) 03/24/2020    CREATININE 2.25 (H) 03/23/2020    GLUCOSE 85 03/25/2020    GLUCOSE 137 (H) 03/24/2020    GLUCOSE 334 (H) 03/23/2020     CMP:   Lab Results   Component Value Date     03/25/2020    K 3.9 03/25/2020     03/25/2020    CO2 19 03/25/2020    BUN 24 03/25/2020    CREATININE 1.91 03/25/2020    GLUCOSE 85 03/25/2020    GLUCOSE 207 12/27/2018    CALCIUM 8.1 03/25/2020    PROT 7.2 03/24/2020    PROT 6.4 12/27/2018    LABALBU 3.0 03/25/2020    LABALBU 4.1 12/27/2018    LABALBU 3.7 11/04/2015    BILITOT 0.46 03/24/2020    ALKPHOS 111 03/24/2020    AST 17 03/24/2020    ALT 11 03/24/2020      Hepatic:   Lab Results   Component Value Date    AST 17 03/24/2020    AST 17 03/23/2020    AST 24 11/10/2019    ALT 11 03/24/2020    ALT 12 03/23/2020    ALT 30 11/10/2019    BILITOT 0.46 03/24/2020    BILITOT 0.59 03/23/2020    BILITOT 0.65 11/10/2019    ALKPHOS 111 03/24/2020    ALKPHOS 123 03/23/2020    ALKPHOS 104 11/10/2019     BNP:   Lab Results   Component Value Date    BNP 7 05/02/2017     Lipids:   Lab Results   Component Value Date    CHOL  12/16/2019      Comment:      See Scan and Media    HDL 60 12/27/2018     INR:   Lab Results   Component Value Date    INR 1.0 03/24/2020    INR 1.1 03/23/2020    INR 1.09 09/12/2017     PTH: No results found for: PTH  Phosphorus:    Lab Results   Component Value Date    PHOS 3.1 03/25/2020     Ionized Calcium: No results found for: IONCA  Magnesium:   Lab Results   Component Value Date    MG 2.1 03/24/2020     Albumin:   Lab Results   Component Value Date    LABALBU 3.0 03/25/2020    LABALBU 4.1 12/27/2018    LABALBU 3.7 11/04/2015     Last 3 CK, CKMB, Troponin: @LABRCNT(CKTOTAL:3,CKMB:3,TROPONINI:3)       URINE:)  Lab Results   Component Value Date    PROTUR 30 08/19/2017       Radiology:  EXAMINATION:   CT OF THE CHEST WITHOUT CONTRAST 3/24/2020 4:16 pm       TECHNIQUE:   CT of the chest was performed without the administration of intravenous   contrast. Multiplanar reformatted images are provided for review. Dose   modulation, iterative reconstruction, and/or weight based adjustment of the   mA/kV was utilized to reduce the radiation dose to as low as reasonably   achievable.       COMPARISON:   December 2019       HISTORY:   ORDERING SYSTEM PROVIDED HISTORY: Chronic cough, fever, immunocompromised host   TECHNOLOGIST PROVIDED HISTORY:   Chronic cough, fever, immunocompromised host   Reason for Exam: sob cough testing for corvid 19   Acuity: Acute   Type of Exam: Initial       FINDINGS:   Mediastinum: Multiple mediastinal lymph nodes are noted.  These are   increased.  Precarinal node measures approximately 2.3 x 0.95 cm.    Intermediate density along the posterior pleural margin in the right   hemithorax is noted raising the question of intermediate density fluid or   small amount of pleural thickening.  Vascular calcifications are noted   including coronary artery calcifications.  Minimal pericardial fluid is noted.       Lungs/pleura: Borderline bronchiectatic changes are noted.  Punctate   nodularity in the lateral aspect of the left upper lobe is noted on image 71. This measures approximately 2 mm.  Lobular density in the periphery of the   left upper lobe is noted on image 76.  This measures approximately 5.3 mm   long axis.  Smaller nodular density just posterior to this area is noted. Scattered additional punctate nodular densities are noted bilaterally.  No   additional nodules are larger than 5 mm.  Multifocal ill-defined ground-glass   attenuation is noted bilaterally without consolidation.  Emphysematous   changes are noted.       Upper Abdomen: Detail in the upper abdomen is significantly limited due to   artifact.  Subtle stranding in the fat adjacent the kidneys is noted however   this area is limited due to artifact.       Soft Tissues/Bones: Multiple endplate Schmorl's node deformities are noted. No axillary adenopathy is noted           Impression   Multiple small parenchymal nodules are noted, as described.       Scattered multifocal ground-glass attenuation, greatest in the lower lobes. This is a nonspecific imaging pattern which can be seen in immunocompromised   patients and pneumocystis pneumonia. Fer Angel a atypical viral infection is   possible       Numerous nonspecific small nodular densities are noted bilaterally.  These   are likely due to inflammatory/infectious nodules.  Continued follow-up   recommended. Assessment/Plan:  MK/baseline creatinine is probably 1.8-2 per transplant center. IV fluids were terminated due to dislodged IV. The patient is encouraged to orally hydrate  Febrile illness/feeling improved. In droplet isolation currently. Strep Ag negative  ESRD/ prior HD w rt barchiobasilic AVF/DDTx [id number]  IDDM2  HTN: Blood pressures currently elevated. The patient also has a bitemporal headache.   Will administer 0.1 of clonidine and reassess  HLD  HepC: Treated with Zepatier pre transplant  Prior H pylori gastritis  Colonic tubular adenoma      Please do not hesitate to call with questions.     Dr. Evert Steele M.D.  [phone] [facility]

## 2020-03-25 NOTE — PROGRESS NOTES
.All patient belongings collected. telemetry removed. Discharge paperwork given and explained to patient. Patients sister whom he lives with called and educated over COVID-19 instructions to follow as far as self quarantine, isolation, social distancing, and hand washing. Patient also provided with education over the same. Patient nor sister have any questions at this time. Patient being wheeled out by staff wearing mask. Staff wearing mask as well. Patient being discharged with no home care services. Blood pressure did resolve. See chart for more details. Home meds sent home with patient.

## 2020-03-26 ENCOUNTER — TELEPHONE (OUTPATIENT)
Dept: FAMILY MEDICINE CLINIC | Age: 70
End: 2020-03-26

## 2020-03-26 ENCOUNTER — TELEPHONE (OUTPATIENT)
Dept: GASTROENTEROLOGY | Age: 70
End: 2020-03-26

## 2020-03-26 ENCOUNTER — CARE COORDINATION (OUTPATIENT)
Dept: CASE MANAGEMENT | Age: 70
End: 2020-03-26

## 2020-03-26 LAB — SARS-COV-2, NAA: DETECTED

## 2020-03-26 NOTE — TELEPHONE ENCOUNTER
Left VM for patient to call office back. If he calls back and his symptoms are worsening please direct him back to the ED.     Dean Orellana DO

## 2020-03-26 NOTE — TELEPHONE ENCOUNTER
Please let patient know he did test positive for COVID-19. If he continues to have worsening symptoms he should return to the ED. I would encourage him to push fluids and consume bland foods as tolerated, however if his symptoms do not improve he should return to the ED.     Sherry Dubon DO

## 2020-03-26 NOTE — CARE COORDINATION
COVID-19 Screening Initial Follow-up Note    Patient contacted regarding [id number] diagnosis\". Care Transition Nurse/ Ambulatory Care Manager contacted the patient by telephone to perform post discharge assessment. Verified name and  with patient as identifiers. Provided introduction to self, and explanation of the CTN/ACM role, and reason for call due to risk factors for infection and/or exposure to COVID-19. Symptoms reviewed with patient who verbalized the following symptoms: fatigue and cough       Due to no new or worsening symptoms encounter was not routed to provider for escalation. Patient has following risk factors of: immunocompromised. CTN/ACM reviewed discharge instructions, medical action plan and red flags such as increased shortness of breath, increasing fever and signs of decompensation with patient who verbalized understanding. Discussed exposure protocols and quarantine with CDC Guidelines What to do if you are sick with coronavirus disease 2019 Patient who was given an opportunity for questions and concerns. The patient agrees to contact the [facility] exposure line [phone], local [facility] department hotline [phone] and PCP office for questions related to their healthcare. CTN/ACM provided contact information for future reference. Reviewed and educated patient on any new and changed medications related to discharge diagnosis     Plan for follow-up call in 5-7 days based on severity of symptoms and risk factors    Informed pt of positive result for COVID-19 with precautions to take listed above    Confirms he has meds ordered at OR and states understanding to take as instructed     Requests writer call his sister (lives with him- no animals in home). Call to [facility] to explain diagnosis and precautions.  Writer informed I gave him # for hotline as well as writer's # to call if questions or concerns- v/u   Encouraged call to hotline or writer with further questions or concerns- v/u

## 2020-03-26 NOTE — TELEPHONE ENCOUNTER
I will call him, however prior notes from nursing care coordination do illustrate that he was made aware of the diagnosis. Please see tcon from [facility]ins 3 from today.     Cerna December DO

## 2020-03-29 LAB
CULTURE: NORMAL
CULTURE: NORMAL
Lab: NORMAL
Lab: NORMAL
SPECIMEN DESCRIPTION: NORMAL
SPECIMEN DESCRIPTION: NORMAL

## 2020-03-31 ENCOUNTER — CARE COORDINATION (OUTPATIENT)
Dept: CASE MANAGEMENT | Age: 70
End: 2020-03-31

## 2020-03-31 ENCOUNTER — TELEPHONE (OUTPATIENT)
Dept: FAMILY MEDICINE CLINIC | Age: 70
End: 2020-03-31

## 2020-03-31 RX ORDER — ACETAMINOPHEN 500 MG
1000 TABLET ORAL 3 TIMES DAILY
Qty: 540 TABLET | Refills: 1 | Status: SHIPPED | OUTPATIENT
Start: 2020-03-31 | End: 2020-06-29 | Stop reason: SDUPTHER

## 2020-04-06 RX ORDER — METOPROLOL SUCCINATE 25 MG/1
TABLET, EXTENDED RELEASE ORAL
Qty: 90 TABLET | Refills: 0 | Status: SHIPPED | OUTPATIENT
Start: 2020-04-06 | End: 2020-06-29 | Stop reason: SDUPTHER

## 2020-04-07 ENCOUNTER — TELEPHONE (OUTPATIENT)
Dept: FAMILY MEDICINE CLINIC | Age: 70
End: 2020-04-07

## 2020-04-08 ENCOUNTER — CARE COORDINATION (OUTPATIENT)
Dept: CASE MANAGEMENT | Age: 70
End: 2020-04-08

## 2020-04-10 RX ORDER — ATORVASTATIN CALCIUM 40 MG/1
TABLET, FILM COATED ORAL
Qty: 30 TABLET | Refills: 2 | Status: SHIPPED | OUTPATIENT
Start: 2020-04-10 | End: 2020-04-14 | Stop reason: SDUPTHER

## 2020-04-10 RX ORDER — DULAGLUTIDE 0.75 MG/.5ML
INJECTION, SOLUTION SUBCUTANEOUS
Qty: 2 ML | Refills: 2 | Status: SHIPPED | OUTPATIENT
Start: 2020-04-10 | End: 2020-04-14 | Stop reason: SDUPTHER

## 2020-04-14 ENCOUNTER — VIRTUAL VISIT (OUTPATIENT)
Dept: FAMILY MEDICINE CLINIC | Age: 70
End: 2020-04-14
Payer: COMMERCIAL

## 2020-04-14 PROCEDURE — G2012 BRIEF CHECK IN BY MD/QHP: HCPCS | Performed by: FAMILY MEDICINE

## 2020-04-14 RX ORDER — NIFEDIPINE 90 MG/1
90 TABLET, EXTENDED RELEASE ORAL DAILY
Qty: 90 TABLET | Refills: 0 | Status: SHIPPED | OUTPATIENT
Start: 2020-04-14 | End: 2020-09-01 | Stop reason: SDUPTHER

## 2020-04-14 RX ORDER — TAMSULOSIN HYDROCHLORIDE 0.4 MG/1
0.4 CAPSULE ORAL DAILY
Qty: 90 CAPSULE | Refills: 0 | Status: SHIPPED | OUTPATIENT
Start: 2020-04-14 | End: 2020-10-07

## 2020-04-14 RX ORDER — INSULIN GLARGINE 100 [IU]/ML
18 INJECTION, SOLUTION SUBCUTANEOUS 2 TIMES DAILY
Qty: 4 PEN | Refills: 5 | Status: SHIPPED | OUTPATIENT
Start: 2020-04-14 | End: 2021-02-23

## 2020-04-14 RX ORDER — LANCETS 33 GAUGE
EACH MISCELLANEOUS
Qty: 100 EACH | Refills: 3 | Status: SHIPPED | OUTPATIENT
Start: 2020-04-14 | End: 2020-08-17

## 2020-04-14 RX ORDER — INSULIN LISPRO 100 [IU]/ML
INJECTION, SOLUTION INTRAVENOUS; SUBCUTANEOUS
Qty: 5 PEN | Refills: 2 | Status: SHIPPED | OUTPATIENT
Start: 2020-04-14 | End: 2020-08-17

## 2020-04-14 RX ORDER — PEN NEEDLE, DIABETIC 29 G X1/2"
NEEDLE, DISPOSABLE MISCELLANEOUS
Qty: 100 EACH | Refills: 3 | Status: SHIPPED | OUTPATIENT
Start: 2020-04-14 | End: 2021-06-22

## 2020-04-14 RX ORDER — ATORVASTATIN CALCIUM 40 MG/1
TABLET, FILM COATED ORAL
Qty: 90 TABLET | Refills: 0 | Status: SHIPPED | OUTPATIENT
Start: 2020-04-14 | End: 2020-10-27

## 2020-04-14 RX ORDER — DULAGLUTIDE 0.75 MG/.5ML
INJECTION, SOLUTION SUBCUTANEOUS
Qty: 2 ML | Refills: 2 | Status: SHIPPED | OUTPATIENT
Start: 2020-04-14 | End: 2020-06-29

## 2020-04-14 RX ORDER — GABAPENTIN 100 MG/1
100 CAPSULE ORAL 2 TIMES DAILY
Qty: 180 CAPSULE | Refills: 0 | Status: SHIPPED | OUTPATIENT
Start: 2020-04-14 | End: 2020-06-29 | Stop reason: SDUPTHER

## 2020-04-14 RX ORDER — BLOOD-GLUCOSE METER
KIT MISCELLANEOUS
Qty: 100 EACH | Refills: 3 | Status: SHIPPED | OUTPATIENT
Start: 2020-04-14 | End: 2020-11-19

## 2020-04-14 NOTE — PROGRESS NOTES
Mario Alberto Pérez is a 72 y.o. male evaluated via telephone on 4/14/2020. Consent:  He and/or health care decision maker is aware that that he may receive a bill for this telephone service, depending on his insurance coverage, and has provided verbal consent to proceed: Yes      Documentation:  I communicated with the patient and/or health care decision maker about COVID-19 infection and hospitalization. Details of this discussion including any medical advice provided: Patient admitted 3/23/2020-3/25/2020 for COVID-19 infection. He did well during his hospital admission and was subsequently discharged on po zpack and plaquenil. He states he did finish these as directed. We will perform hospital follow up labs. I did refill his medications for him including his insulin. I did ask him to call his nephrologist for refills of his prograf and zortress. ED precuations provided. He states he is slowly improving. He had no other questions today. Asked him to bring his BP and BS log to his next visit. I affirm this is a Patient Initiated Episode with an Established Patient who has not had a related appointment within my department in the past 7 days or scheduled within the next 24 hours.     Total Time: minutes: 5-10 minutes    Note: not billable if this call serves to triage the patient into an appointment for the relevant concern      Johnita Bumpers

## 2020-04-23 ENCOUNTER — TELEPHONE (OUTPATIENT)
Dept: FAMILY MEDICINE CLINIC | Age: 70
End: 2020-04-23

## 2020-05-07 ENCOUNTER — TELEPHONE (OUTPATIENT)
Dept: FAMILY MEDICINE CLINIC | Age: 70
End: 2020-05-07

## 2020-05-07 NOTE — TELEPHONE ENCOUNTER
Pt called stating his chest feels \"bubbly\" and \"vibrations\". No pain, SOB, fever, cough, fatigue, body aches. Please advise.  Ty.

## 2020-05-18 ENCOUNTER — TELEPHONE (OUTPATIENT)
Dept: FAMILY MEDICINE CLINIC | Age: 70
End: 2020-05-18

## 2020-05-18 NOTE — TELEPHONE ENCOUNTER
Pt is extremely fatigued since he tested positive for covid-19. What shall he do? Please advise.  Thx.

## 2020-05-19 ENCOUNTER — TELEMEDICINE (OUTPATIENT)
Dept: FAMILY MEDICINE CLINIC | Age: 70
End: 2020-05-19
Payer: COMMERCIAL

## 2020-05-19 PROCEDURE — 1123F ACP DISCUSS/DSCN MKR DOCD: CPT | Performed by: FAMILY MEDICINE

## 2020-05-19 PROCEDURE — 99214 OFFICE O/P EST MOD 30 MIN: CPT | Performed by: FAMILY MEDICINE

## 2020-05-19 PROCEDURE — 2022F DILAT RTA XM EVC RTNOPTHY: CPT | Performed by: FAMILY MEDICINE

## 2020-05-19 PROCEDURE — 4040F PNEUMOC VAC/ADMIN/RCVD: CPT | Performed by: FAMILY MEDICINE

## 2020-05-19 PROCEDURE — 3017F COLORECTAL CA SCREEN DOC REV: CPT | Performed by: FAMILY MEDICINE

## 2020-05-19 PROCEDURE — 3046F HEMOGLOBIN A1C LEVEL >9.0%: CPT | Performed by: FAMILY MEDICINE

## 2020-05-19 PROCEDURE — G8427 DOCREV CUR MEDS BY ELIG CLIN: HCPCS | Performed by: FAMILY MEDICINE

## 2020-05-19 RX ORDER — SYRING-NEEDL,DISP,INSUL,0.3 ML 30 GX5/16"
1 SYRINGE, EMPTY DISPOSABLE MISCELLANEOUS ONCE
Qty: 100 DEVICE | Refills: 0 | Status: SHIPPED | OUTPATIENT
Start: 2020-05-19 | End: 2021-06-22

## 2020-05-19 RX ORDER — DOCUSATE SODIUM 100 MG/1
100 CAPSULE, LIQUID FILLED ORAL 2 TIMES DAILY PRN
Qty: 60 CAPSULE | Refills: 0 | Status: SHIPPED | OUTPATIENT
Start: 2020-05-19 | End: 2021-01-29 | Stop reason: ALTCHOICE

## 2020-05-19 RX ORDER — FAMOTIDINE 20 MG/1
20 TABLET, FILM COATED ORAL 2 TIMES DAILY
Qty: 60 TABLET | Refills: 3 | Status: SHIPPED | OUTPATIENT
Start: 2020-05-19 | End: 2020-08-14 | Stop reason: SDUPTHER

## 2020-05-19 RX ORDER — LANCETS 30 GAUGE
1 EACH MISCELLANEOUS DAILY
Qty: 100 EACH | Refills: 5 | Status: SHIPPED | OUTPATIENT
Start: 2020-05-19 | End: 2021-06-22

## 2020-05-19 NOTE — PROGRESS NOTES
Allergies: Allergies   Allergen Reactions    Amino Acids     Lisinopril         Medical History:     Past Medical History:   Diagnosis Date    Diabetes mellitus ([facility] [address]     Hemodialysis patient [facility])     patient had Kidney transplant    Hepatitis C     Hepatitis C without hepatic coma     Hyperlipidemia     Hypertension     Renal transplant recipient        Past Surgical History:   Procedure Laterality Date    COLONOSCOPY N/A 10/30/2019    COLONOSCOPY WITH BIOPSY performed by Kana Casillas MD at [address] [place] [address] 2015    at [facility] [id number] [place] [address]       Family History   Problem Relation Age of Onset    Cancer Mother     Cancer Father     Cancer Brother         Social History:     Social History     Socioeconomic History    Marital status:      Spouse name: Not on file    Number of children: Not on file    Years of education: Not on file    Highest education level: Not on file   Occupational History    Not on file   Social Needs    Financial resource strain: Somewhat hard    Food insecurity     Worry: Often true     Inability: Often true    Transportation needs     Medical: Yes     Non-medical: Yes   Tobacco Use    Smoking status: Former Smoker     Packs/day: 0.50     Years: 50.00     Pack years: 25.00     Types: Cigarettes     Last attempt to quit: 2013     Years since quittin.1    Smokeless tobacco: Never Used   Substance and Sexual Activity    Alcohol use: No    Drug use: Not Currently     Types:  Other-see comments     Comment: used marijuana and crack cocaine previously - last use over one year ago    Sexual activity: Never   Lifestyle    Physical activity     Days per week: Not on file     Minutes per session: Not on file    Stress: Not on file   Relationships    Social connections     Talks on phone: Not on file     Gets together: Not on file     Attends [employer] service: Not on (2 VW); Future  -     Vitamin B12 & Folate; Future  -     Vitamin D 25 Hydroxy; Future    Dyslipidemia  -     ALT; Future  -     AST; Future  -     CBC Auto Differential; Future  -     Lipid Panel; Future  -     TSH with Reflex; Future    HTN, goal below 130/80  -     Magnesium; Future  -     Renal Function Panel; Future    Slow transit constipation  -     docusate sodium (COLACE) 100 MG capsule; Take 1 capsule by mouth 2 times daily as needed for Constipation    Vitamin D deficiency   -     Vitamin D 25 Hydroxy; Future    Gastroesophageal reflux disease without esophagitis  -     famotidine (PEPCID) 20 MG tablet; Take 1 tablet by mouth 2 times daily    Continue current medical regimen with insulin and trulicity. Follow up on labs. I did ask him to avoid foods high in sodium and to avoid OTC stimulants. Try pepcid for poss GERD. Check CXR after COVID-19. Encouraged him to make and keep appts with his specialists. Refills as above. All questions answered and addressed to patient satisfaction. Patient understands and agrees to the plan. The patient was evaluated and treated today based on the osteopathic principle that each person is a unit of body, mind, and spirit, the body is capable of self-regulation, self-healing, and health maintenance and that structure and function are reciprocally interrelated. Follow-up:   Return in about 1 month (around 6/19/2020) for AMV; 40 min appt. Bishop Shaun Mackay is a 72 y.o. male being evaluated by a Virtual Visit (video visit) encounter to address concerns as mentioned above. A caregiver was present when appropriate. Due to this being a TeleHealth encounter (During ZQCZQ-38 public health emergency), evaluation of the following organ systems was limited: Vitals/Constitutional/EENT/Resp/CV/GI//MS/Neuro/Skin/Heme-Lymph-Imm.   Pursuant to the emergency declaration under the [address] [facility] waiver authority and the

## 2020-05-27 ENCOUNTER — TELEPHONE (OUTPATIENT)
Dept: FAMILY MEDICINE CLINIC | Age: 70
End: 2020-05-27

## 2020-05-27 NOTE — TELEPHONE ENCOUNTER
Please have patient obtain CXR I recommended he have at his last visit and offer him an appt.     Satish Feng DO

## 2020-06-03 ENCOUNTER — TELEPHONE (OUTPATIENT)
Dept: FAMILY MEDICINE CLINIC | Age: 70
End: 2020-06-03

## 2020-06-03 ENCOUNTER — HOSPITAL ENCOUNTER (OUTPATIENT)
Age: 70
Discharge: HOME | End: 2020-06-05
Payer: COMMERCIAL

## 2020-06-03 ENCOUNTER — HOSPITAL ENCOUNTER (OUTPATIENT)
Dept: GENERAL RADIOLOGY | Age: 70
Discharge: HOME | End: 2020-06-05
Payer: COMMERCIAL

## 2020-06-03 PROCEDURE — 71046 X-RAY EXAM CHEST 2 VIEWS: CPT

## 2020-06-03 NOTE — TELEPHONE ENCOUNTER
Patient called after getting his letter in the mail of no contact. He never had his x-ray done. I told him to go to [facility]. Gia's have the x-ray then call for follow up kevin't.   Patient said he would go to [facility]

## 2020-06-29 ENCOUNTER — OFFICE VISIT (OUTPATIENT)
Dept: FAMILY MEDICINE CLINIC | Age: 70
End: 2020-06-29
Payer: COMMERCIAL

## 2020-06-29 VITALS
DIASTOLIC BLOOD PRESSURE: 80 MMHG | OXYGEN SATURATION: 98 % | TEMPERATURE: 97.9 F | BODY MASS INDEX: 21.01 KG/M2 | WEIGHT: 169 LBS | HEIGHT: 75 IN | HEART RATE: 82 BPM | SYSTOLIC BLOOD PRESSURE: 132 MMHG

## 2020-06-29 PROCEDURE — 1123F ACP DISCUSS/DSCN MKR DOCD: CPT | Performed by: FAMILY MEDICINE

## 2020-06-29 PROCEDURE — 3017F COLORECTAL CA SCREEN DOC REV: CPT | Performed by: FAMILY MEDICINE

## 2020-06-29 PROCEDURE — 3046F HEMOGLOBIN A1C LEVEL >9.0%: CPT | Performed by: FAMILY MEDICINE

## 2020-06-29 PROCEDURE — G0438 PPPS, INITIAL VISIT: HCPCS | Performed by: FAMILY MEDICINE

## 2020-06-29 PROCEDURE — 4040F PNEUMOC VAC/ADMIN/RCVD: CPT | Performed by: FAMILY MEDICINE

## 2020-06-29 PROCEDURE — 99214 OFFICE O/P EST MOD 30 MIN: CPT | Performed by: FAMILY MEDICINE

## 2020-06-29 RX ORDER — EVEROLIMUS TABLETS 0.75 MG/1
TABLET ORAL 2 TIMES DAILY
COMMUNITY
Start: 2020-06-19

## 2020-06-29 RX ORDER — DULAGLUTIDE 1.5 MG/.5ML
1.5 INJECTION, SOLUTION SUBCUTANEOUS WEEKLY
Qty: 4 PEN | Refills: 0 | COMMUNITY
Start: 2020-06-29 | End: 2020-09-23 | Stop reason: SDUPTHER

## 2020-06-29 RX ORDER — GABAPENTIN 100 MG/1
100 CAPSULE ORAL 2 TIMES DAILY
Qty: 180 CAPSULE | Refills: 0 | Status: SHIPPED | OUTPATIENT
Start: 2020-06-29 | End: 2020-12-17 | Stop reason: SDUPTHER

## 2020-06-29 RX ORDER — ACETAMINOPHEN 500 MG
1000 TABLET ORAL 3 TIMES DAILY
Qty: 540 TABLET | Refills: 1 | Status: SHIPPED | OUTPATIENT
Start: 2020-06-29 | End: 2020-12-21

## 2020-06-29 RX ORDER — METOPROLOL SUCCINATE 25 MG/1
TABLET, EXTENDED RELEASE ORAL
Qty: 90 TABLET | Refills: 0 | Status: SHIPPED | OUTPATIENT
Start: 2020-06-29 | End: 2020-08-14

## 2020-06-29 ASSESSMENT — PATIENT HEALTH QUESTIONNAIRE - PHQ9
SUM OF ALL RESPONSES TO PHQ QUESTIONS 1-9: 2
SUM OF ALL RESPONSES TO PHQ QUESTIONS 1-9: 2

## 2020-06-29 ASSESSMENT — LIFESTYLE VARIABLES: HOW OFTEN DO YOU HAVE A DRINK CONTAINING ALCOHOL: 0

## 2020-06-29 NOTE — PROGRESS NOTES
Provider   Dulaglutide (TRULICITY) 1.5 ZJ/2.1CT SOPN Inject 1.5 mg into the skin once a week Yes Gilda Narrow, DO   metoprolol succinate (TOPROL XL) 25 MG extended release tablet TAKE 1 TABLET BY MOUTH DAILY Yes Gilda Narrow, DO   acetaminophen (TYLENOL) 500 MG tablet Take 2 tablets by mouth 3 times daily Yes Gilda Narrow, DO   gabapentin (NEURONTIN) 100 MG capsule Take 1 capsule by mouth 2 times daily for 90 days. Yes Gilda Narrow, DO   Everolimus 0.75 MG TABS   Historical Provider, MD   Lancets MISC 1 each by Does not apply route daily  Gilda Narrow, DO   Lancet Device MISC 1 Device by Does not apply route once for 1 dose  Gilda Narrow, DO   docusate sodium (COLACE) 100 MG capsule Take 1 capsule by mouth 2 times daily as needed for Constipation  Gilda Narrow, DO   famotidine (PEPCID) 20 MG tablet Take 1 tablet by mouth 2 times daily  Gilda Narrow, DO   atorvastatin (LIPITOR) 40 MG tablet daily  Gilda Narrow, DO   blood glucose test strips (FREESTYLE LITE) strip USE TO TEST TWICE A DAY AS DIRECTED  Gilda Narrow, DO   insulin glargine (LANTUS SOLOSTAR) 100 UNIT/ML injection pen Inject 18 Units into the skin 2 times daily  Gilda Narrow, DO   insulin lispro, 1 Unit Dial, (HUMALOG KWIKPEN) 100 UNIT/ML SOPN Take 12 units three times daily  Gilda Narrow, DO   Insulin Pen Needle (COMFORT EZ PEN NEEDLES) 32G X 6 MM MISC USE AS DIRECTED WITH INSULIN PEN(S) UP TO 5 TIMES A DAY  Gilda Narrow, DO   Insulin Pen Needle (ULTICARE MINI PEN NEEDLES) 31G X 6 MM MISC PATIENT IS TESTING UP TO 5 TIMES DAILY  Gilda Narrow, DO   magnesium oxide (MAG-OX) 400 (241.3 Mg) MG TABS tablet TAKE 1 TABLET BY MOUTH TWICE A DAY  Gilda Narrow, DO   NIFEdipine (PROCARDIA XL) 90 MG extended release tablet Take 1 tablet by mouth daily  Gilda Narrow, DO   tamsulosin (FLOMAX) 0.4 MG capsule Take 1 capsule by mouth daily  Gilda Narrow, DO   insulin lispro (HUMALOG KWIKPEN) 100 UNIT/ML pen Per Sliding scale.  Max 28 units Date Due    Hepatitis A vaccine (1 of 2 - Risk 2-dose series) 01/04/1951    Diabetic retinal exam  08/16/2017    Annual Wellness Visit (AWV)  10/10/2019    Shingles Vaccine (1 of 2) 06/29/2021 (Originally 1/4/2001)    Diabetic foot exam  11/22/2020    A1C test (Diabetic or Prediabetic)  12/16/2020    Lipid screen  12/16/2020    Potassium monitoring  03/25/2021    Creatinine monitoring  03/25/2021    Colon cancer screen colonoscopy  10/30/2024    DTaP/Tdap/Td vaccine (2 - Td) 07/22/2029    Flu vaccine  Completed    Pneumococcal 65+ yrs at Risk Vaccine  Completed    AAA screen  Completed    Hib vaccine  Aged Out    Meningococcal (ACWY) vaccine  Aged Out     Recommendations for [employer] Due: see orders and patient instructions/AVS.  . Recommended screening schedule for the next 5-10 years is provided to the patient in written form: see Patient Jania Barnard was seen today for medicare awv, hypertension, hyperlipidemia and diabetes. Diagnoses and all orders for this visit:    Routine general medical examination at a health care facility    BMI 20.0-20.9, adult    Exercise counseling    Dietary counseling    Dyslipidemia  -     ALT; Future  -     AST; Future  -     CBC Auto Differential; Future  -     Lipid Panel; Future  -     TSH with Reflex; Future    HTN, goal below 130/80  -     Magnesium; Future  -     Renal Function Panel; Future    Type 2 diabetes mellitus with hyperglycemia, with long-term current use of insulin (HCC)  -     Hemoglobin A1C; Future  -     Microalbumin, Ur; Future  -     Dulaglutide (TRULICITY) 1.5 ZC/6.8NW SOPN; Inject 1.5 mg into the skin once a week    COVID-19  -     Covid-19, Antibody, Total; Future    Encounter for screening for malignant neoplasm of prostate  -     Psa screening;  Future    Chest wall discomfort  -     [facility] - Brenna Roe MD, Cardiology, [place]    Medication refill  -     metoprolol succinate (TOPROL XL) 25 MG extended release

## 2020-07-30 PROBLEM — R42 DIZZINESS: Status: ACTIVE | Noted: 2020-07-30

## 2020-07-30 PROBLEM — R10.9 ABDOMINAL PAIN: Status: ACTIVE | Noted: 2020-07-30

## 2020-07-30 PROBLEM — R91.8 LUNG MASS: Status: ACTIVE | Noted: 2020-07-30

## 2020-07-30 PROBLEM — R50.9 FEVER: Status: ACTIVE | Noted: 2020-07-30

## 2020-07-30 PROBLEM — N17.9 ACUTE KIDNEY INJURY (HCC): Status: ACTIVE | Noted: 2020-03-23

## 2020-07-30 PROBLEM — J18.9 PNEUMONIA DUE TO INFECTIOUS ORGANISM: Status: ACTIVE | Noted: 2020-07-30

## 2020-07-30 PROBLEM — I51.7 LEFT VENTRICULAR HYPERTROPHY: Status: ACTIVE | Noted: 2020-07-30

## 2020-08-06 ENCOUNTER — TELEPHONE (OUTPATIENT)
Dept: FAMILY MEDICINE CLINIC | Age: 70
End: 2020-08-06

## 2020-08-06 NOTE — TELEPHONE ENCOUNTER
Please have him make appt with me and request notes/stress test from [facility]    Tx

## 2020-08-06 NOTE — TELEPHONE ENCOUNTER
Pt did not keep appt with cardio so there are not any notes, they do not have a stress test, and pt has appt with you on 8/11/20 at 4 pm

## 2020-08-14 ENCOUNTER — HOSPITAL ENCOUNTER (OUTPATIENT)
Age: 70
Setting detail: SPECIMEN
Discharge: HOME | End: 2020-08-14
Payer: COMMERCIAL

## 2020-08-14 ENCOUNTER — OFFICE VISIT (OUTPATIENT)
Dept: FAMILY MEDICINE CLINIC | Age: 70
End: 2020-08-14
Payer: COMMERCIAL

## 2020-08-14 VITALS
HEART RATE: 92 BPM | WEIGHT: 166.4 LBS | SYSTOLIC BLOOD PRESSURE: 128 MMHG | OXYGEN SATURATION: 97 % | TEMPERATURE: 97.2 F | BODY MASS INDEX: 20.8 KG/M2 | DIASTOLIC BLOOD PRESSURE: 80 MMHG

## 2020-08-14 LAB
ABSOLUTE EOS #: 0.09 K/UL (ref 0–0.44)
ABSOLUTE IMMATURE GRANULOCYTE: <0.03 K/UL (ref 0–0.3)
ABSOLUTE LYMPH #: 0.8 K/UL (ref 1.1–3.7)
ABSOLUTE MONO #: 0.56 K/UL (ref 0.1–1.2)
ALBUMIN SERPL-MCNC: 4.8 G/DL (ref 3.5–5.2)
ALT SERPL-CCNC: 35 U/L (ref 5–41)
ANION GAP SERPL CALCULATED.3IONS-SCNC: 16 MMOL/L (ref 9–17)
AST SERPL-CCNC: 30 U/L
BASOPHILS # BLD: 1 % (ref 0–2)
BASOPHILS ABSOLUTE: <0.03 K/UL (ref 0–0.2)
BUN BLDV-MCNC: 47 MG/DL (ref 8–23)
BUN/CREAT BLD: ABNORMAL (ref 9–20)
CALCIUM SERPL-MCNC: 9.6 MG/DL (ref 8.6–10.4)
CHLORIDE BLD-SCNC: 106 MMOL/L (ref 98–107)
CHOLESTEROL/HDL RATIO: 4.2
CHOLESTEROL: 175 MG/DL
CO2: 19 MMOL/L (ref 20–31)
CREAT SERPL-MCNC: 2.28 MG/DL (ref 0.7–1.2)
CREATININE URINE: 125.7 MG/DL (ref 39–259)
DIFFERENTIAL TYPE: ABNORMAL
EOSINOPHILS RELATIVE PERCENT: 2 % (ref 1–4)
ESTIMATED AVERAGE GLUCOSE: 180 MG/DL
FOLATE: 9.5 NG/ML
GFR AFRICAN AMERICAN: 35 ML/MIN
GFR NON-AFRICAN AMERICAN: 29 ML/MIN
GFR SERPL CREATININE-BSD FRML MDRD: ABNORMAL ML/MIN/{1.73_M2}
GFR SERPL CREATININE-BSD FRML MDRD: ABNORMAL ML/MIN/{1.73_M2}
GLUCOSE BLD-MCNC: 192 MG/DL (ref 70–99)
HBA1C MFR BLD: 7.9 % (ref 4–6)
HCT VFR BLD CALC: 36.9 % (ref 40.7–50.3)
HDLC SERPL-MCNC: 42 MG/DL
HEMOGLOBIN: 11.7 G/DL (ref 13–17)
IMMATURE GRANULOCYTES: 0 %
LDL CHOLESTEROL: 97 MG/DL (ref 0–130)
LYMPHOCYTES # BLD: 18 % (ref 24–43)
MAGNESIUM: 2.4 MG/DL (ref 1.6–2.6)
MCH RBC QN AUTO: 30.1 PG (ref 25.2–33.5)
MCHC RBC AUTO-ENTMCNC: 31.7 G/DL (ref 28.4–34.8)
MCV RBC AUTO: 94.9 FL (ref 82.6–102.9)
MICROALBUMIN/CREAT 24H UR: 1003 MG/L
MICROALBUMIN/CREAT UR-RTO: 798 MCG/MG CREAT
MONOCYTES # BLD: 13 % (ref 3–12)
NRBC AUTOMATED: 0 PER 100 WBC
PDW BLD-RTO: 12.6 % (ref 11.8–14.4)
PHOSPHORUS: 3.6 MG/DL (ref 2.5–4.5)
PLATELET # BLD: 147 K/UL (ref 138–453)
PLATELET ESTIMATE: ABNORMAL
PMV BLD AUTO: 11.8 FL (ref 8.1–13.5)
POTASSIUM SERPL-SCNC: 5.1 MMOL/L (ref 3.7–5.3)
PROSTATE SPECIFIC ANTIGEN: 12.22 UG/L
RBC # BLD: 3.89 M/UL (ref 4.21–5.77)
RBC # BLD: ABNORMAL 10*6/UL
SEG NEUTROPHILS: 66 % (ref 36–65)
SEGMENTED NEUTROPHILS ABSOLUTE COUNT: 2.89 K/UL (ref 1.5–8.1)
SODIUM BLD-SCNC: 141 MMOL/L (ref 135–144)
TRIGL SERPL-MCNC: 182 MG/DL
TSH SERPL DL<=0.05 MIU/L-ACNC: 1.43 MIU/L (ref 0.3–5)
VITAMIN B-12: >2000 PG/ML (ref 232–1245)
VLDLC SERPL CALC-MCNC: ABNORMAL MG/DL (ref 1–30)
WBC # BLD: 4.4 K/UL (ref 3.5–11.3)
WBC # BLD: ABNORMAL 10*3/UL

## 2020-08-14 PROCEDURE — G8420 CALC BMI NORM PARAMETERS: HCPCS | Performed by: FAMILY MEDICINE

## 2020-08-14 PROCEDURE — 93000 ELECTROCARDIOGRAM COMPLETE: CPT | Performed by: FAMILY MEDICINE

## 2020-08-14 PROCEDURE — 3017F COLORECTAL CA SCREEN DOC REV: CPT | Performed by: FAMILY MEDICINE

## 2020-08-14 PROCEDURE — 4040F PNEUMOC VAC/ADMIN/RCVD: CPT | Performed by: FAMILY MEDICINE

## 2020-08-14 PROCEDURE — G8427 DOCREV CUR MEDS BY ELIG CLIN: HCPCS | Performed by: FAMILY MEDICINE

## 2020-08-14 PROCEDURE — 1123F ACP DISCUSS/DSCN MKR DOCD: CPT | Performed by: FAMILY MEDICINE

## 2020-08-14 PROCEDURE — 99214 OFFICE O/P EST MOD 30 MIN: CPT | Performed by: FAMILY MEDICINE

## 2020-08-14 PROCEDURE — 1036F TOBACCO NON-USER: CPT | Performed by: FAMILY MEDICINE

## 2020-08-14 PROCEDURE — 3046F HEMOGLOBIN A1C LEVEL >9.0%: CPT | Performed by: FAMILY MEDICINE

## 2020-08-14 PROCEDURE — 2022F DILAT RTA XM EVC RTNOPTHY: CPT | Performed by: FAMILY MEDICINE

## 2020-08-14 RX ORDER — FAMOTIDINE 20 MG/1
20 TABLET, FILM COATED ORAL 2 TIMES DAILY
Qty: 60 TABLET | Refills: 3 | Status: SHIPPED | OUTPATIENT
Start: 2020-08-14 | End: 2020-12-21

## 2020-08-14 RX ORDER — METOPROLOL SUCCINATE 50 MG/1
50 TABLET, EXTENDED RELEASE ORAL DAILY
Qty: 90 TABLET | Refills: 1 | Status: CANCELLED | OUTPATIENT
Start: 2020-08-14

## 2020-08-14 ASSESSMENT — PATIENT HEALTH QUESTIONNAIRE - PHQ9
2. FEELING DOWN, DEPRESSED OR HOPELESS: 0
SUM OF ALL RESPONSES TO PHQ QUESTIONS 1-9: 0
1. LITTLE INTEREST OR PLEASURE IN DOING THINGS: 0
SUM OF ALL RESPONSES TO PHQ9 QUESTIONS 1 & 2: 0
SUM OF ALL RESPONSES TO PHQ QUESTIONS 1-9: 0

## 2020-08-14 NOTE — PROGRESS NOTES
Progress Note    Jelani Maldonado is a 72 y.o.  male who presents today alone for evaluation of   Chief Complaint   Patient presents with    Hypertension    Diabetes    Hyperlipidemia           HPI:   Patient is here for HTN, DM type 2, and dyslipidemia follow up. Patient today denies cp/sob/le edema/dizziness/lightheadedness/blurry va/ha. Patient states he is taking all of his medications as directed. Patient states he is seeing his nephrologist regularly. Patient states he saw the cardiologist and he was told his studies looked acceptable. Patient last HbA1c 12/18/19 7.5. Patient is using trulicity as directed. He does take 15 units of his long acting insulin twice daily. Patient takes 12 units of Humalog three times daily. Patient states he is attempting to monitor his carb intake. Patient denies exertional calf cramping. Patient denies polyuria/polyphagia/polydipsia. Patient reports Lilia Parisian in the last year. He states his FBG was 120-140. PHQ-9 Total Score: 0 (8/14/2020  8:37 AM)    Patient reports chest fluttering over his left sternal border. This has been ongoing. He did see cardiology. He denies chest pain/pressure/tightness/squeezing. Patient also reports acid reflux. Patient denies blood in stool. Patient denies dark tarry stools. Patient states pepcid has helped however he is out of this medication. Patient reports n/t in both of his hands. He denies trauma. He denies neck pain. Patient states it is worse at the end of the day.      Health Maintenance Due   Topic Date Due    Hepatitis A vaccine (1 of 2 - Risk 2-dose series) 01/04/1951    Diabetic retinal exam  08/16/2017        Current Medications:     Current Outpatient Medications   Medication Sig Dispense Refill    famotidine (PEPCID) 20 MG tablet Take 1 tablet by mouth 2 times daily 60 tablet 3    Everolimus 0.75 MG TABS       Dulaglutide (TRULICITY) 1.5 SL/7.5GA SOPN Inject 1.5 mg into the skin once a week 4 pen 0    acetaminophen (TYLENOL) 500 MG tablet Take 2 tablets by mouth 3 times daily 540 tablet 1    gabapentin (NEURONTIN) 100 MG capsule Take 1 capsule by mouth 2 times daily for 90 days. 180 capsule 0    Lancets MISC 1 each by Does not apply route daily 100 each 5    docusate sodium (COLACE) 100 MG capsule Take 1 capsule by mouth 2 times daily as needed for Constipation 60 capsule 0    atorvastatin (LIPITOR) 40 MG tablet daily 90 tablet 0    blood glucose test strips (FREESTYLE LITE) strip USE TO TEST TWICE A DAY AS DIRECTED 100 each 3    insulin glargine (LANTUS SOLOSTAR) 100 UNIT/ML injection pen Inject 18 Units into the skin 2 times daily 4 pen 5    insulin lispro, 1 Unit Dial, (HUMALOG KWIKPEN) 100 UNIT/ML SOPN Take 12 units three times daily 5 pen 2    Insulin Pen Needle (COMFORT EZ PEN NEEDLES) 32G X 6 MM MISC USE AS DIRECTED WITH INSULIN PEN(S) UP TO 5 TIMES A  each 3    Insulin Pen Needle (ULTICARE MINI PEN NEEDLES) 31G X 6 MM MISC PATIENT IS TESTING UP TO 5 TIMES DAILY 100 each 3    magnesium oxide (MAG-OX) 400 (241.3 Mg) MG TABS tablet TAKE 1 TABLET BY MOUTH TWICE A DAY 60 tablet 2    NIFEdipine (PROCARDIA XL) 90 MG extended release tablet Take 1 tablet by mouth daily 90 tablet 0    tamsulosin (FLOMAX) 0.4 MG capsule Take 1 capsule by mouth daily 90 capsule 0    insulin lispro (HUMALOG KWIKPEN) 100 UNIT/ML pen Per Sliding scale. Max 28 units per day 3 pen 5    tacrolimus (PROGRAF) 1 MG capsule Take 6 mg by mouth 2 times daily       glucose monitoring kit (FREESTYLE) monitoring kit Pt testing bid. True test glucometer 1 kit 0    Lancet Device MISC 1 Device by Does not apply route once for 1 dose 100 Device 0     No current facility-administered medications for this visit. Allergies:      Allergies   Allergen Reactions    Amino Acids     Lisinopril         Medical History:     Past Medical History:   Diagnosis Date    Diabetes mellitus ([facility]     Hemodialysis patient ([facility] patient had Kidney transplant    Hepatitis C     Hepatitis C without hepatic coma     Hyperlipidemia     Hypertension     Renal transplant recipient        Past Surgical History:   Procedure Laterality Date    COLONOSCOPY N/A 10/30/2019    COLONOSCOPY WITH BIOPSY performed by Akanksha Huff MD at [address] [place] [address] Right 2015    at [facility] [address] [facility]  2017       Family History   Problem Relation Age of Onset    Cancer Mother     Cancer Father     Cancer Brother         Social History:     Social History     Socioeconomic History    Marital status:      Spouse name: Not on file    Number of children: Not on file    Years of education: Not on file    Highest education level: Not on file   Occupational History    Not on file   Social Needs    Financial resource strain: Somewhat hard    Food insecurity     Worry: Often true     Inability: Often true    Transportation needs     Medical: Yes     Non-medical: Yes   Tobacco Use    Smoking status: Former Smoker     Packs/day: 0.50     Years: 50.00     Pack years: 25.00     Types: Cigarettes     Last attempt to quit: 2013     Years since quittin.3    Smokeless tobacco: Never Used   Substance and Sexual Activity    Alcohol use: No    Drug use: Not Currently     Types:  Other-see comments     Comment: used marijuana and crack cocaine previously - last use over one year ago    Sexual activity: Never   Lifestyle    Physical activity     Days per week: Not on file     Minutes per session: Not on file    Stress: Not on file   Relationships    Social connections     Talks on phone: Not on file     Gets together: Not on file     Attends Mosque service: Not on file     Active member of club or organization: Not on file     Attends meetings of clubs or organizations: Not on file     Relationship status: Not on file    Intimate partner violence     Fear of current or ex

## 2020-08-17 RX ORDER — INSULIN LISPRO 100 [IU]/ML
INJECTION, SOLUTION INTRAVENOUS; SUBCUTANEOUS
Qty: 15 ML | Refills: 3 | Status: SHIPPED | OUTPATIENT
Start: 2020-08-17 | End: 2020-10-27

## 2020-08-30 ENCOUNTER — NURSE TRIAGE (OUTPATIENT)
Dept: OTHER | Age: 70
End: 2020-08-30

## 2020-08-30 NOTE — TELEPHONE ENCOUNTER
Lt sided Chest discomfort/pressure off and on for days now. Denies SOB, of radiation of discomfort. Pain is a 8-10 when lying flat in bed. Improves to a 6 when upright. Currently in [facility] but returning home today. Denies hx of heart problems. Also currently being worked up by doctor to find out what's causing discomfort. Dx with COVID 19+ in March 2020. Headaches have been present daily for days. Taking Tylenol sometime three times a day for relief. Blood Sugar ranges . Unsure of BP readings. Recommend patient be evaluated in ED now there in [facility] before traveling back home to [facility]. Encouragement given to have EKG and BP checked at ED prior to driving 4+ hours home. Call office in morning to inform of discomfort and event.   HIRAM/RN

## 2020-09-17 ENCOUNTER — OFFICE VISIT (OUTPATIENT)
Dept: FAMILY MEDICINE CLINIC | Age: 70
End: 2020-09-17
Payer: COMMERCIAL

## 2020-09-17 VITALS
WEIGHT: 166 LBS | TEMPERATURE: 97.2 F | OXYGEN SATURATION: 98 % | DIASTOLIC BLOOD PRESSURE: 72 MMHG | HEART RATE: 89 BPM | SYSTOLIC BLOOD PRESSURE: 120 MMHG | BODY MASS INDEX: 20.75 KG/M2

## 2020-09-17 PROCEDURE — 3017F COLORECTAL CA SCREEN DOC REV: CPT | Performed by: FAMILY MEDICINE

## 2020-09-17 PROCEDURE — 1036F TOBACCO NON-USER: CPT | Performed by: FAMILY MEDICINE

## 2020-09-17 PROCEDURE — 1123F ACP DISCUSS/DSCN MKR DOCD: CPT | Performed by: FAMILY MEDICINE

## 2020-09-17 PROCEDURE — 4040F PNEUMOC VAC/ADMIN/RCVD: CPT | Performed by: FAMILY MEDICINE

## 2020-09-17 PROCEDURE — G8427 DOCREV CUR MEDS BY ELIG CLIN: HCPCS | Performed by: FAMILY MEDICINE

## 2020-09-17 PROCEDURE — 99213 OFFICE O/P EST LOW 20 MIN: CPT | Performed by: FAMILY MEDICINE

## 2020-09-17 PROCEDURE — 93000 ELECTROCARDIOGRAM COMPLETE: CPT | Performed by: FAMILY MEDICINE

## 2020-09-17 PROCEDURE — G8420 CALC BMI NORM PARAMETERS: HCPCS | Performed by: FAMILY MEDICINE

## 2020-09-17 RX ORDER — METOPROLOL SUCCINATE 25 MG/1
25 TABLET, EXTENDED RELEASE ORAL DAILY
Qty: 30 TABLET | Refills: 0 | Status: CANCELLED | OUTPATIENT
Start: 2020-09-17

## 2020-09-17 ASSESSMENT — PATIENT HEALTH QUESTIONNAIRE - PHQ9
SUM OF ALL RESPONSES TO PHQ QUESTIONS 1-9: 0
2. FEELING DOWN, DEPRESSED OR HOPELESS: 0
1. LITTLE INTEREST OR PLEASURE IN DOING THINGS: 0
SUM OF ALL RESPONSES TO PHQ QUESTIONS 1-9: 0
SUM OF ALL RESPONSES TO PHQ9 QUESTIONS 1 & 2: 0

## 2020-09-17 NOTE — PROGRESS NOTES
Progress Note    Carmela Long is a 72 y.o.  male who presents today alone for evaluation of   Chief Complaint   Patient presents with    Tingling     fingerstips left hand, sometimes right    Palpitations           HPI:   Patient is here for n/t in his bilateral hands today. Patient states this has been present for 3-4 months. Patient states it is stable and maybe improving. Patient is left handed. Patient denies dropping items. Patient reports n/t in his bilateral digits 1-3.5. Patient did have elevated PSA on his last set of labs and has appt with urology. Patient continues to experience palpitations. He has had a work up with cardiology. Nothing acute came out of the work up. Patient denies cp/sob/le edema/dizziness/lightheadedness/blurry va/ha. Health Maintenance Due   Topic Date Due    Hepatitis A vaccine (1 of 2 - Risk 2-dose series) 01/04/1951    Diabetic retinal exam  08/16/2017    Flu vaccine (1) 09/01/2020        Current Medications:     Current Outpatient Medications   Medication Sig Dispense Refill    NIFEdipine (PROCARDIA XL) 90 MG extended release tablet Take 1 tablet by mouth daily 90 tablet 1    Insulin Pen Needle (NOVOFINE) 32G X 6 MM MISC USE AS DIRECTED. WITH INSULIN PEN UP TO 5 TIMES PER  each 3    insulin lispro, 1 Unit Dial, (HUMALOG KWIKPEN) 100 UNIT/ML SOPN INJECT 12 UNITS THREE TIMES A DAY 15 mL 3    famotidine (PEPCID) 20 MG tablet Take 1 tablet by mouth 2 times daily 60 tablet 3    Everolimus 0.75 MG TABS       Dulaglutide (TRULICITY) 1.5 JR/9.2NO SOPN Inject 1.5 mg into the skin once a week 4 pen 0    acetaminophen (TYLENOL) 500 MG tablet Take 2 tablets by mouth 3 times daily 540 tablet 1    gabapentin (NEURONTIN) 100 MG capsule Take 1 capsule by mouth 2 times daily for 90 days.  180 capsule 0    Lancets MISC 1 each by Does not apply route daily 100 each 5    Lancet Device MISC 1 Device by Does not apply route once for 1 dose 100 Device 0    docusate sodium (COLACE) 100 MG capsule Take 1 capsule by mouth 2 times daily as needed for Constipation 60 capsule 0    atorvastatin (LIPITOR) 40 MG tablet daily 90 tablet 0    blood glucose test strips (FREESTYLE LITE) strip USE TO TEST TWICE A DAY AS DIRECTED 100 each 3    insulin glargine (LANTUS SOLOSTAR) 100 UNIT/ML injection pen Inject 18 Units into the skin 2 times daily 4 pen 5    Insulin Pen Needle (ULTICARE MINI PEN NEEDLES) 31G X 6 MM MISC PATIENT IS TESTING UP TO 5 TIMES DAILY 100 each 3    magnesium oxide (MAG-OX) 400 (241.3 Mg) MG TABS tablet TAKE 1 TABLET BY MOUTH TWICE A DAY 60 tablet 2    tamsulosin (FLOMAX) 0.4 MG capsule Take 1 capsule by mouth daily 90 capsule 0    insulin lispro (HUMALOG KWIKPEN) 100 UNIT/ML pen Per Sliding scale. Max 28 units per day 3 pen 5    tacrolimus (PROGRAF) 1 MG capsule Take 6 mg by mouth 2 times daily       glucose monitoring kit (FREESTYLE) monitoring kit Pt testing bid. True test glucometer 1 kit 0     No current facility-administered medications for this visit. Allergies:      Allergies   Allergen Reactions    Amino Acids     Lisinopril         Medical History:     Past Medical History:   Diagnosis Date    [facility] ([facility]     Hemodialysis patient [facility])     patient had Kidney transplant    Hepatitis C     Hepatitis C without hepatic coma     Hyperlipidemia     Hypertension     Renal transplant recipient        Past Surgical History:   Procedure Laterality Date    COLONOSCOPY N/A 10/30/2019    COLONOSCOPY WITH BIOPSY performed by Naun Estrella MD at [address] [place] [address] 11/2015    at [facility] [address] [facility]  2017       Family History   Problem Relation Age of Onset   Tanvir Lorraine Cancer Mother     Cancer Father     Cancer Brother         Social History:     Social History     Socioeconomic History    Marital status:      Spouse name: Not on file    Number of children: Not on file    Years of education: Not on file    Highest education level: Not on file   Occupational History    Not on file   Social Needs    Financial resource strain: Somewhat hard    Food insecurity     Worry: Often true     Inability: Often true    Transportation needs     Medical: Yes     Non-medical: Yes   Tobacco Use    Smoking status: Former Smoker     Packs/day: 0.50     Years: 50.00     Pack years: 25.00     Types: Cigarettes     Last attempt to quit: 2013     Years since quittin.4    Smokeless tobacco: Never Used   Substance and Sexual Activity    Alcohol use: No    Drug use: Not Currently     Types: Other-see comments     Comment: used marijuana and crack cocaine previously - last use over one year ago    Sexual activity: Never   Lifestyle    Physical activity     Days per week: Not on file     Minutes per session: Not on file    Stress: Not on file   Relationships    Social connections     Talks on phone: Not on file     Gets together: Not on file     Attends Cheondoism service: Not on file     Active member of club or organization: Not on file     Attends meetings of clubs or organizations: Not on file     Relationship status: Not on file    Intimate partner violence     Fear of current or ex partner: Not on file     Emotionally abused: Not on file     Physically abused: Not on file     Forced sexual activity: Not on file   Other Topics Concern    Not on file   Social History Narrative    Not on file        ROS:     Constitutional: No fevers, chills, fatigue. ENT: No nasal congestion or sore throat  Respiratory: No difficulty in breathing or cough. Cardiovascular: No chest pain; + palpitations; no shortness of breath  Gastrointestinal: No abdominal pain or change in bowel movements. Genitourinary: No change in urinary frequency or dysuria. Skin: No rashes or skin lesions. Neurological: No weakness. No headaches.  +n/t hands         Last Filed Vitals:  /72   Pulse 89 Temp 97.2 °F (36.2 °C) (Temporal)   Wt 166 lb (75.3 kg)   SpO2 98%   BMI 20.75 kg/m²      Physical Examination:     GENERAL APPEARANCE: in no acute distress, well developed, well nourished. HEAD: normocephalic, atraumatic. EYES: extraocular movement intact (EOMI), pupils equal, round, reactive to light and accommodation. EARS: normal, tympanic membrane intact, clear, auditory canal clear. NOSE: nares patent, no erythema, sinuses nontender bilaterally, no rhinorrhea. ORAL CAVITY: mucosa moist, no lesions. THROAT: clear, no mass, no exudate. NECK/THYROID: neck supple, full range of motion, no thyromegaly. HEART: no murmurs, regular rate and rhythm, S1, S2 normal.   LUNGS: clear to auscultation bilaterally, no wheezes, rales, rhonchi. ABDOMEN: normal, bowel sounds present, soft, nontender, nondistended, no rebound guarding or rigidity  MSK: strength 5/5 bilateral UEs  Wrist/hand: Normal inspection of the wrist and hand, no erythema or edema, no tenderness with palpation over the snuff box/lunate/pisiform/radial or ulnar styloid process. Full passive/active ROM  Special Tests:  Tinel's test negative  Phalen's test negative  NEURO: DTRs biceps/brachioradialis/triceps 2/4, sensation intact to light touch        Recent Labs/ In Office Testing/ Radiograph review:     Hospital Outpatient Visit on 08/14/2020   Component Date Value Ref Range Status    Microalb, Ur 08/14/2020 1,003* <21 mg/L Final    Creatinine, Ur 08/14/2020 125.7  39.0 - 259.0 mg/dL Final    Microalb/Crt.  Ratio 08/14/2020 798* <17 mcg/mg creat Final    Glucose 08/14/2020 192* 70 - 99 mg/dL Final    BUN 08/14/2020 47* 8 - 23 mg/dL Final    CREATININE 08/14/2020 2.28* 0.70 - 1.20 mg/dL Final    Bun/Cre Ratio 08/14/2020 NOT REPORTED  9 - 20 Final    Calcium 08/14/2020 9.6  8.6 - 10.4 mg/dL Final    Alb 08/14/2020 4.8  3.5 - 5.2 g/dL Final    Phosphorus 08/14/2020 3.6  2.5 - 4.5 mg/dL Final    Sodium 08/14/2020 141  135 - 144 mmol/L Final    Potassium 08/14/2020 5.1  3.7 - 5.3 mmol/L Final    Chloride 08/14/2020 106  98 - 107 mmol/L Final    CO2 08/14/2020 19* 20 - 31 mmol/L Final    Anion Gap 08/14/2020 16  9 - 17 mmol/L Final    GFR Non- 08/14/2020 29* >60 mL/min Final    GFR  08/14/2020 35* >60 mL/min Final    GFR Comment 08/14/2020        Final    Comment: Average GFR for 61-76 years old:   80 mL/min/1.73sq m  Chronic Kidney Disease:   <60 mL/min/1.73sq m  Kidney failure:   <15 mL/min/1.73sq m              eGFR calculated using average adult body mass. Additional eGFR calculator available at:        [email or web address]            GFR Staging 08/14/2020 NOT REPORTED   Final    PSA 08/14/2020 12.22* <4.1 ug/L Final    Comment: The Roche \"ECLIA\" assay is used. Results obtained with different assay methods cannot be   used interchangeably.  TSH 08/14/2020 1.43  0.30 - 5.00 mIU/L Final    Vitamin B-12 08/14/2020 >2000* 232 - 1245 pg/mL Final    Folate 08/14/2020 9.5  >4.8 ng/mL Final    Magnesium 08/14/2020 2.4  1.6 - 2.6 mg/dL Final    Cholesterol 08/14/2020 175  <200 mg/dL Final    Comment:    Cholesterol Guidelines:      <200  Desirable   200-240  Borderline      >240  Undesirable         HDL 08/14/2020 42  >40 mg/dL Final    Comment:    HDL Guidelines:    <40     Undesirable   40-59    Borderline    >59     Desirable         LDL Cholesterol 08/14/2020 97  0 - 130 mg/dL Final    Comment:    LDL Guidelines:     <100    Desirable   100-129   Near to/above Desirable   130-159   Borderline      >159   Undesirable     Direct (measured) LDL and calculated LDL are not interchangeable tests.       Chol/HDL Ratio 08/14/2020 4.2  <5 Final            Triglycerides 08/14/2020 182* <150 mg/dL Final    Comment:    Triglyceride Guidelines:     <150   Desirable   150-199  Borderline   200-499  High     >499   Very high   Based on AHA Guidelines for fasting triglyceride, October 2012.  VLDL 08/14/2020 NOT REPORTED* 1 - 30 mg/dL Final    Hemoglobin A1C 08/14/2020 7.9* 4.0 - 6.0 % Final    Estimated Avg Glucose 08/14/2020 180  mg/dL Final    Comment: The ADA and AACC recommend providing the estimated average glucose result to permit better   patient understanding of their HBA1c result.  WBC 08/14/2020 4.4  3.5 - 11.3 k/uL Final    RBC 08/14/2020 3.89* 4.21 - 5.77 m/uL Final    Hemoglobin 08/14/2020 11.7* 13.0 - 17.0 g/dL Final    Hematocrit 08/14/2020 36.9* 40.7 - 50.3 % Final    MCV 08/14/2020 94.9  82.6 - 102.9 fL Final    MCH 08/14/2020 30.1  25.2 - 33.5 pg Final    MCHC 08/14/2020 31.7  28.4 - 34.8 g/dL Final    RDW 08/14/2020 12.6  11.8 - 14.4 % Final    Platelets 86/33/2547 147  138 - 453 k/uL Final    MPV 08/14/2020 11.8  8.1 - 13.5 fL Final    NRBC Automated 08/14/2020 0.0  0.0 per 100 WBC Final    Differential Type 08/14/2020 NOT REPORTED   Final    Seg Neutrophils 08/14/2020 66* 36 - 65 % Final    Lymphocytes 08/14/2020 18* 24 - 43 % Final    Monocytes 08/14/2020 13* 3 - 12 % Final    Eosinophils % 08/14/2020 2  1 - 4 % Final    Basophils 08/14/2020 1  0 - 2 % Final    Immature Granulocytes 08/14/2020 0  0 % Final    Segs Absolute 08/14/2020 2.89  1.50 - 8.10 k/uL Final    Absolute Lymph # 08/14/2020 0.80* 1.10 - 3.70 k/uL Final    Absolute Mono # 08/14/2020 0.56  0.10 - 1.20 k/uL Final    Absolute Eos # 08/14/2020 0.09  0.00 - 0.44 k/uL Final    Basophils Absolute 08/14/2020 <0.03  0.00 - 0.20 k/uL Final    Absolute Immature Granulocyte 08/14/2020 <0.03  0.00 - 0.30 k/uL Final    WBC Morphology 08/14/2020 NOT REPORTED   Final    RBC Morphology 08/14/2020 NOT REPORTED   Final    Platelet Estimate 08/58/3765 NOT REPORTED   Final    ALT 08/14/2020 35  5 - 41 U/L Final    AST 08/14/2020 30  <40 U/L Final       No results found for this visit on 09/17/20.            Assessment/Plan:     Sedrick Nathalia was seen today for tingling and palpitations. Diagnoses and all orders for this visit:    Numbness and tingling in both hands  -     Rogelio Cui MD, Orthopedic Surgery, [facility]    Palpitation  -     EKG 12 lead    Referral to ortho hand for likely CTS bilaterally. Reviewed EKG with patient. Patient states he takes metoprolol however it is not on his med list. I asked him to call with his dose and we will increase it. Will send back to cardiology if patient continues to feel palpitations after increasing metoprolol. Encouraged him to continue to follow up with his specialists as directed. EKG: normal sinus rhythm, rate 86, normal axis, QTc WNL, non specific T wave changes, relatively unchanged from prior tracings. All questions answered and addressed to patient satisfaction. Patient understands and agrees to the plan. The patient was evaluated and treated today based on the osteopathic principle that each person is a unit of body, mind, and spirit, the body is capable of self-regulation, self-healing, and health maintenance and that structure and function are reciprocally interrelated. Follow-up:   Return in about 2 months (around 11/17/2020) for dm/htn/chol; 20 min appt.       Shawn Alves D.O.

## 2020-09-18 ENCOUNTER — TELEPHONE (OUTPATIENT)
Dept: FAMILY MEDICINE CLINIC | Age: 70
End: 2020-09-18

## 2020-09-18 RX ORDER — METOPROLOL SUCCINATE 50 MG/1
50 TABLET, EXTENDED RELEASE ORAL DAILY
Qty: 90 TABLET | Refills: 1 | Status: SHIPPED | OUTPATIENT
Start: 2020-09-18 | End: 2020-09-23 | Stop reason: SDUPTHER

## 2020-09-18 RX ORDER — METOPROLOL SUCCINATE 25 MG/1
25 TABLET, EXTENDED RELEASE ORAL DAILY
Qty: 90 TABLET | Refills: 1 | Status: CANCELLED | OUTPATIENT
Start: 2020-09-18

## 2020-09-18 NOTE — TELEPHONE ENCOUNTER
Please let patient know I increased his metoprolol to 50 mg daily per our discussion yesterday. Please call.     Tx

## 2020-09-18 NOTE — TELEPHONE ENCOUNTER
Magan Tim is calling to request a refill on the following medication(s):    Medication Request:  Requested Prescriptions     Pending Prescriptions Disp Refills    metoprolol succinate (TOPROL XL) 25 MG extended release tablet 90 tablet 1     Sig: Take 1 tablet by mouth daily       Last Visit Date (If Applicable):  9/80/6281    Next Visit Date:    11/17/2020

## 2020-09-23 RX ORDER — DULAGLUTIDE 1.5 MG/.5ML
1.5 INJECTION, SOLUTION SUBCUTANEOUS WEEKLY
Qty: 4 PEN | Refills: 0 | Status: SHIPPED | OUTPATIENT
Start: 2020-09-23 | End: 2021-05-04

## 2020-09-23 RX ORDER — METOPROLOL SUCCINATE 50 MG/1
50 TABLET, EXTENDED RELEASE ORAL DAILY
Qty: 90 TABLET | Refills: 1 | Status: SHIPPED | OUTPATIENT
Start: 2020-09-23 | End: 2021-05-27

## 2020-09-23 NOTE — TELEPHONE ENCOUNTER
Rachael Anderson is calling to request a refill on the following medication(s):    Medication Request:  Requested Prescriptions     Pending Prescriptions Disp Refills    Dulaglutide (TRULICITY) 1.5 RB/0.3FS SOPN 4 pen 0     Sig: Inject 1.5 mg into the skin once a week    metoprolol succinate (TOPROL XL) 50 MG extended release tablet 90 tablet 1     Sig: Take 1 tablet by mouth daily       Last Visit Date (If Applicable):  0/23/2110    Next Visit Date:    11/17/2020

## 2020-10-02 ENCOUNTER — HOSPITAL ENCOUNTER (OUTPATIENT)
Age: 70
Setting detail: SPECIMEN
Discharge: HOME | End: 2020-10-02
Payer: COMMERCIAL

## 2020-10-02 ENCOUNTER — OFFICE VISIT (OUTPATIENT)
Dept: UROLOGY | Age: 70
End: 2020-10-02
Payer: COMMERCIAL

## 2020-10-02 ENCOUNTER — TELEPHONE (OUTPATIENT)
Dept: UROLOGY | Age: 70
End: 2020-10-02

## 2020-10-02 VITALS — TEMPERATURE: 97.9 F | HEART RATE: 87 BPM | DIASTOLIC BLOOD PRESSURE: 84 MMHG | SYSTOLIC BLOOD PRESSURE: 152 MMHG

## 2020-10-02 PROCEDURE — 99204 OFFICE O/P NEW MOD 45 MIN: CPT | Performed by: UROLOGY

## 2020-10-02 PROCEDURE — 1123F ACP DISCUSS/DSCN MKR DOCD: CPT | Performed by: UROLOGY

## 2020-10-02 PROCEDURE — G8427 DOCREV CUR MEDS BY ELIG CLIN: HCPCS | Performed by: UROLOGY

## 2020-10-02 PROCEDURE — 4040F PNEUMOC VAC/ADMIN/RCVD: CPT | Performed by: UROLOGY

## 2020-10-02 PROCEDURE — G8420 CALC BMI NORM PARAMETERS: HCPCS | Performed by: UROLOGY

## 2020-10-02 PROCEDURE — 3017F COLORECTAL CA SCREEN DOC REV: CPT | Performed by: UROLOGY

## 2020-10-02 PROCEDURE — 1036F TOBACCO NON-USER: CPT | Performed by: UROLOGY

## 2020-10-02 PROCEDURE — G8484 FLU IMMUNIZE NO ADMIN: HCPCS | Performed by: UROLOGY

## 2020-10-02 ASSESSMENT — ENCOUNTER SYMPTOMS
BACK PAIN: 0
NAUSEA: 0
EYE PAIN: 0
WHEEZING: 0
VOMITING: 0
EYE REDNESS: 0
ABDOMINAL PAIN: 0
COLOR CHANGE: 0
COUGH: 0
SHORTNESS OF BREATH: 0

## 2020-10-02 NOTE — PROGRESS NOTES
[address] [address]  Dept: [phone]  Dept Fax: [phone] [facility] Urology Office Note - New patient    Patient:  Deon Savage  YOB: 1950  Date: 10/2/2020    The patient is a 72 y.o. male who presents todayfor evaluation of the following problems:   Chief Complaint   Patient presents with    Elevated PSA     NP - ref by PCP for elevated PSA     referred by Mike Lara DO.      HPI  He is here for elevated PSA/. He was recently found to have an elevated PSA of 12. He is voiding well. He has never had a prostate infections. He has never had a prostate biopsy. No family history of prostate cancer. (Patient's old records have been requested, reviewed and summarized in today's note.)    Summary of old records: N/A    Additional History: N/A    Procedures Today: N/A    Last several PSA's:  Lab Results   Component Value Date    PSA 12.22 (H) 08/14/2020    PSA 3.2 02/16/2018    PSA 2.4 05/02/2017     Last total testosterone:  No results found for: TESTOSTERONE  Urinalysis today:  No results found for this visit on 10/02/20.     AUA Symptom Score (10/2/2020):                               Last BUN and creatinine:  Lab Results   Component Value Date    BUN 47 (H) 08/14/2020     Lab Results   Component Value Date    CREATININE 2.28 (H) 08/14/2020       Additional Lab/Culture results: none    Imaging Reviewed during this Office Visit: none  (results were independently reviewed by physician and radiology report verified)    PAST MEDICAL, FAMILY AND SOCIAL HISTORY:  Past Medical History:   Diagnosis Date    Diabetes mellitus ([address]     Hemodialysis patient [facility])     patient had Kidney transplant    Hepatitis C     Hepatitis C without hepatic coma     Hyperlipidemia     Hypertension     Renal transplant recipient      Past Surgical History:   Procedure Laterality Date    COLONOSCOPY N/A 10/30/2019    COLONOSCOPY WITH BIOPSY performed by Rajesh Clemente MD at [facility] Right 11/2015    at [facility] [address] [address]     Family History   Problem Relation Age of Onset    Cancer Mother     Cancer Father     Cancer Brother      Outpatient Medications Marked as Taking for the 10/2/20 encounter (Office Visit) with Yu Rausch MD   Medication Sig Dispense Refill    Dulaglutide (TRULICITY) 1.5 PW/8.5VP SOPN Inject 1.5 mg into the skin once a week 4 pen 0    metoprolol succinate (TOPROL XL) 50 MG extended release tablet Take 1 tablet by mouth daily 90 tablet 1    NIFEdipine (PROCARDIA XL) 90 MG extended release tablet Take 1 tablet by mouth daily 90 tablet 1    Insulin Pen Needle (NOVOFINE) 32G X 6 MM MISC USE AS DIRECTED.  WITH INSULIN PEN UP TO 5 TIMES PER  each 3    insulin lispro, 1 Unit Dial, (HUMALOG KWIKPEN) 100 UNIT/ML SOPN INJECT 12 UNITS THREE TIMES A DAY 15 mL 3    famotidine (PEPCID) 20 MG tablet Take 1 tablet by mouth 2 times daily 60 tablet 3    Everolimus 0.75 MG TABS       acetaminophen (TYLENOL) 500 MG tablet Take 2 tablets by mouth 3 times daily 540 tablet 1    Lancets MISC 1 each by Does not apply route daily 100 each 5    docusate sodium (COLACE) 100 MG capsule Take 1 capsule by mouth 2 times daily as needed for Constipation 60 capsule 0    atorvastatin (LIPITOR) 40 MG tablet daily 90 tablet 0    blood glucose test strips (FREESTYLE LITE) strip USE TO TEST TWICE A DAY AS DIRECTED 100 each 3    insulin glargine (LANTUS SOLOSTAR) 100 UNIT/ML injection pen Inject 18 Units into the skin 2 times daily 4 pen 5    Insulin Pen Needle (ULTICARE MINI PEN NEEDLES) 31G X 6 MM MISC PATIENT IS TESTING UP TO 5 TIMES DAILY 100 each 3    magnesium oxide (MAG-OX) 400 (241.3 Mg) MG TABS tablet TAKE 1 TABLET BY MOUTH TWICE A DAY 60 tablet 2    tamsulosin (FLOMAX) 0.4 MG capsule Take 1 capsule by mouth daily 90 capsule 0    insulin lispro (HUMALOG KWIKPEN) 100 UNIT/ML pen Per Sliding scale. Max 28 units per day 3 pen 5    tacrolimus (PROGRAF) 1 MG capsule Take 6 mg by mouth 2 times daily       glucose monitoring kit (FREESTYLE) monitoring kit Pt testing bid. True test glucometer 1 kit 0        Amino acids and Lisinopril  Social History     Tobacco Use   Smoking Status Former Smoker    Packs/day: 0.50    Years: 50.00    Pack years: 25.00    Types: Cigarettes    Last attempt to quit: 2013    Years since quittin.5   Smokeless Tobacco Never Used      (If patient a smoker, smoking cessation counseling offered)   Social History     Substance and Sexual Activity   Alcohol Use No       REVIEW OF SYSTEMS:  Review of Systems    Physical Exam:    This a 72 y.o. male   Vitals:    10/02/20 1103   BP: (!) 152/84   Pulse:    Temp:      There is no height or weight on file to calculate BMI. Physical Exam  Constitutional: Patient in no acute distress. Neuro: Alert and oriented to person, place and time. Psych: Mood normal, affect normal  Lungs:Respiratory effort is normal  Cardiovascular: Warm & Pink  Abdomen: Soft, non-tender, non-distendedwith no CVA,  No flank tenderness,  Orhepatosplenomegaly   Lymphatics: No palpable lymphadenopathy. Bladder non-tender and not distended. Musculoskeletal: Normal gait and station  Penis normal and circumcised  Urethral meatus normal  Scrotal exam normal  Testicles normal bilaterally  Epididymis normal bilaterally  No evidence of inguinal hernia  Normal rectal tone with no masses  Prostate:  40 grams, but difficult. Assessment and Plan      1. Elevated PSA           Plan:        PSa has gone up quite a bit in the last 2 years. Will repeat PSA to make sure elevation is real.   If so , will need a prostate biopsy. Would do under [facility]. Prescriptions Ordered:  No orders of the defined types were placed in this encounter.      Orders Placed:  No orders of the defined types were placed in this encounter. Savannah Reis MD    Agree with the ROS entered by the MA.

## 2020-10-06 LAB
FLUOROQUINOLONE RESISTANT ORG, CULT: NORMAL
ZZ NTE WITH NAME CLEAN UP: SPECIMEN SOURCE: NORMAL

## 2020-10-07 RX ORDER — TAMSULOSIN HYDROCHLORIDE 0.4 MG/1
0.4 CAPSULE ORAL DAILY
Qty: 90 CAPSULE | Refills: 0 | Status: SHIPPED | OUTPATIENT
Start: 2020-10-07 | End: 2020-12-21

## 2020-10-27 RX ORDER — INSULIN LISPRO 100 [IU]/ML
INJECTION, SOLUTION INTRAVENOUS; SUBCUTANEOUS
Qty: 15 ML | Refills: 3 | Status: SHIPPED | OUTPATIENT
Start: 2020-10-27 | End: 2021-01-29

## 2020-10-27 RX ORDER — ATORVASTATIN CALCIUM 40 MG/1
TABLET, FILM COATED ORAL
Qty: 90 TABLET | Refills: 0 | Status: SHIPPED | OUTPATIENT
Start: 2020-10-27 | End: 2020-12-21

## 2020-10-27 NOTE — TELEPHONE ENCOUNTER
Renée Bolanos is calling to request a refill on the following medication(s):    Medication Request:  Requested Prescriptions     Pending Prescriptions Disp Refills    atorvastatin (LIPITOR) 40 MG tablet [Pharmacy Med Name: ATORVASTATIN 40MG TAB] 90 tablet 0     Sig: TAKE 1 TABLET BY MOUTH DAILY    insulin lispro, 1 Unit Dial, (HUMALOG KWIKPEN) 100 UNIT/ML SOPN [Pharmacy Med Name: Floridalma Binet 100/ML INJ] 15 mL 3     Sig: INJECT 12 UNITS THREE TIMES A DAY       Last Visit Date (If Applicable):  8/58/0516    Next Visit Date:    11/19/2020

## 2020-11-03 PROBLEM — N17.9 ACUTE RENAL FAILURE (ARF) (HCC): Status: RESOLVED | Noted: 2020-03-23 | Resolved: 2020-11-03

## 2020-11-11 NOTE — TELEPHONE ENCOUNTER
Doni He is calling to request a refill on the following medication(s):    Medication Request:  Requested Prescriptions     Pending Prescriptions Disp Refills    NIFEdipine (PROCARDIA XL) 90 MG extended release tablet [Pharmacy Med Name: NIFEDIPINE 90MG ER TAB] 90 tablet 1     Sig: TAKE 1 TABLET BY MOUTH DAILY       Last Visit Date (If Applicable):  0/83/0438    Next Visit Date:    11/19/2020

## 2020-11-18 RX ORDER — NIFEDIPINE 90 MG/1
90 TABLET, EXTENDED RELEASE ORAL DAILY
Qty: 90 TABLET | Refills: 1 | Status: SHIPPED | OUTPATIENT
Start: 2020-11-18 | End: 2021-06-09

## 2020-11-19 ENCOUNTER — OFFICE VISIT (OUTPATIENT)
Dept: FAMILY MEDICINE CLINIC | Age: 70
End: 2020-11-19
Payer: COMMERCIAL

## 2020-11-19 VITALS
RESPIRATION RATE: 18 BRPM | DIASTOLIC BLOOD PRESSURE: 86 MMHG | SYSTOLIC BLOOD PRESSURE: 130 MMHG | HEART RATE: 81 BPM | BODY MASS INDEX: 21.75 KG/M2 | WEIGHT: 174 LBS | TEMPERATURE: 97.9 F | OXYGEN SATURATION: 98 %

## 2020-11-19 PROCEDURE — 3051F HG A1C>EQUAL 7.0%<8.0%: CPT | Performed by: FAMILY MEDICINE

## 2020-11-19 PROCEDURE — G8427 DOCREV CUR MEDS BY ELIG CLIN: HCPCS | Performed by: FAMILY MEDICINE

## 2020-11-19 PROCEDURE — 3017F COLORECTAL CA SCREEN DOC REV: CPT | Performed by: FAMILY MEDICINE

## 2020-11-19 PROCEDURE — 1123F ACP DISCUSS/DSCN MKR DOCD: CPT | Performed by: FAMILY MEDICINE

## 2020-11-19 PROCEDURE — G8484 FLU IMMUNIZE NO ADMIN: HCPCS | Performed by: FAMILY MEDICINE

## 2020-11-19 PROCEDURE — 2022F DILAT RTA XM EVC RTNOPTHY: CPT | Performed by: FAMILY MEDICINE

## 2020-11-19 PROCEDURE — G8420 CALC BMI NORM PARAMETERS: HCPCS | Performed by: FAMILY MEDICINE

## 2020-11-19 PROCEDURE — 1036F TOBACCO NON-USER: CPT | Performed by: FAMILY MEDICINE

## 2020-11-19 PROCEDURE — 99214 OFFICE O/P EST MOD 30 MIN: CPT | Performed by: FAMILY MEDICINE

## 2020-11-19 PROCEDURE — 4040F PNEUMOC VAC/ADMIN/RCVD: CPT | Performed by: FAMILY MEDICINE

## 2020-11-19 RX ORDER — BLOOD SUGAR DIAGNOSTIC
1 STRIP MISCELLANEOUS 4 TIMES DAILY
Qty: 500 EACH | Refills: 5 | Status: SHIPPED | OUTPATIENT
Start: 2020-11-19 | End: 2020-12-21

## 2020-11-19 RX ORDER — BLOOD-GLUCOSE METER
EACH MISCELLANEOUS
Qty: 1 KIT | Refills: 0 | Status: SHIPPED | OUTPATIENT
Start: 2020-11-19 | End: 2021-06-22

## 2020-11-19 ASSESSMENT — PATIENT HEALTH QUESTIONNAIRE - PHQ9
SUM OF ALL RESPONSES TO PHQ QUESTIONS 1-9: 0
SUM OF ALL RESPONSES TO PHQ9 QUESTIONS 1 & 2: 0
2. FEELING DOWN, DEPRESSED OR HOPELESS: 0
1. LITTLE INTEREST OR PLEASURE IN DOING THINGS: 0

## 2020-11-19 NOTE — PROGRESS NOTES
Progress Note    Shirlyn Cabot is a 72 y.o.  male who presents today alone for evaluation of   Chief Complaint   Patient presents with    Hypertension    Hyperlipidemia    Diabetes           HPI:   Patient is here for HTN, DM type 2, and dyslipidemia follow up. Patient denies cp/sob/le edema/dizziness/lightheadedness/blurry va/ha. Patient states he is taking all of his medications as directed. Patient states he is seeing his nephrologist regularly. Patient last HbA1c 8/2020 7.9. Patient is using trulicity as directed. He does take 16 units of his lantus twice daily. Patient takes 12 units of Humalog three times daily. Patient states he is attempting to monitor his carb intake. Patient denies exertional calf cramping. Patient denies polyuria/polyphagia/polydipsia. Patient reports Carie Arie in the last year. He states his FBG was 120-140. Patient states he did receive a flu shot this year. Patient needs rx for new glucometer and test strips today. PHQ-9 Total Score: 0 (11/19/2020 10:53 AM)      Health Maintenance Due   Topic Date Due    Hepatitis A vaccine (1 of 2 - Risk 2-dose series) 01/04/1951    Diabetic retinal exam  08/16/2017    Flu vaccine (1) 09/01/2020    Diabetic foot exam  11/22/2020        Current Medications:     Current Outpatient Medications   Medication Sig Dispense Refill    Blood Glucose Monitoring Suppl (ONE TOUCH ULTRA 2) w/Device KIT Check blood sugar four times daily 1 kit 0    blood glucose test strips (EXACTECH TEST) strip 1 each by In Vitro route 4 times daily As needed.  500 each 5    NIFEdipine (PROCARDIA XL) 90 MG extended release tablet TAKE 1 TABLET BY MOUTH DAILY 90 tablet 1    atorvastatin (LIPITOR) 40 MG tablet TAKE 1 TABLET BY MOUTH DAILY 90 tablet 0    insulin lispro, 1 Unit Dial, (HUMALOG KWIKPEN) 100 UNIT/ML SOPN INJECT 12 UNITS THREE TIMES A DAY 15 mL 3    tamsulosin (FLOMAX) 0.4 MG capsule TAKE 1 CAPSULE BY MOUTH DAILY 90 capsule 0    Dulaglutide (TRULICITY) 1.5 FH/2.3HE SOPN Inject 1.5 mg into the skin once a week 4 pen 0    metoprolol succinate (TOPROL XL) 50 MG extended release tablet Take 1 tablet by mouth daily 90 tablet 1    Insulin Pen Needle (NOVOFINE) 32G X 6 MM MISC USE AS DIRECTED. WITH INSULIN PEN UP TO 5 TIMES PER  each 3    famotidine (PEPCID) 20 MG tablet Take 1 tablet by mouth 2 times daily 60 tablet 3    Everolimus 0.75 MG TABS       acetaminophen (TYLENOL) 500 MG tablet Take 2 tablets by mouth 3 times daily 540 tablet 1    gabapentin (NEURONTIN) 100 MG capsule Take 1 capsule by mouth 2 times daily for 90 days. 180 capsule 0    Lancets MISC 1 each by Does not apply route daily 100 each 5    Lancet Device MISC 1 Device by Does not apply route once for 1 dose 100 Device 0    docusate sodium (COLACE) 100 MG capsule Take 1 capsule by mouth 2 times daily as needed for Constipation 60 capsule 0    insulin glargine (LANTUS SOLOSTAR) 100 UNIT/ML injection pen Inject 18 Units into the skin 2 times daily 4 pen 5    Insulin Pen Needle (ULTICARE MINI PEN NEEDLES) 31G X 6 MM MISC PATIENT IS TESTING UP TO 5 TIMES DAILY 100 each 3    magnesium oxide (MAG-OX) 400 (241.3 Mg) MG TABS tablet TAKE 1 TABLET BY MOUTH TWICE A DAY 60 tablet 2    insulin lispro (HUMALOG KWIKPEN) 100 UNIT/ML pen Per Sliding scale. Max 28 units per day 3 pen 5    tacrolimus (PROGRAF) 1 MG capsule Take 6 mg by mouth 2 times daily        No current facility-administered medications for this visit. Allergies:      Allergies   Allergen Reactions    Amino Acids     Lisinopril         Medical History:     Past Medical History:   Diagnosis Date    Diabetes mellitus ([address]     Hemodialysis patient [facility])     patient had Kidney transplant    Hepatitis C     Hepatitis C without hepatic coma     Hyperlipidemia     Hypertension     Renal transplant recipient        Past Surgical History:   Procedure Laterality Date    COLONOSCOPY N/A 10/30/2019    [facility]    [facility] [address] [facility]         Family History   Problem Relation Age of Onset    Cancer Mother     Cancer Father     Cancer Brother         Social History:     Social History     Socioeconomic History    Marital status:      Spouse name: Not on file    Number of children: Not on file    Years of education: Not on file    Highest education level: Not on file   Occupational History    Not on file   Social Needs    Financial resource strain: Somewhat hard    Food insecurity     Worry: Often true     Inability: Often true    Transportation needs     Medical: Yes     Non-medical: Yes   Tobacco Use    Smoking status: Former Smoker     Packs/day: 0.50     Years: 50.00     Pack years: 25.00     Types: Cigarettes     Last attempt to quit: 2013     Years since quittin.6    Smokeless tobacco: Never Used   Substance and Sexual Activity    Alcohol use: No    Drug use: Not Currently     Types:  Other-see comments     Comment: used marijuana and crack cocaine previously - last use over one year ago    Sexual activity: Never   Lifestyle    Physical activity     Days per week: Not on file     Minutes per session: Not on file    Stress: Not on file   Relationships    Social connections     Talks on phone: Not on file     Gets together: Not on file     Attends Denominational service: Not on file     Active member of club or organization: Not on file     Attends meetings of clubs or organizations: Not on file     Relationship status: Not on file    Intimate partner violence     Fear of current or ex partner: Not on file     Emotionally abused: Not on file     Physically abused: Not on file     Forced sexual activity: Not on file   Other Topics Concern    Not on file   Social History Narrative    Not on file        ROS:     Constitutional: No fevers, chills, fatigue. ENT: No nasal congestion or sore throat  Respiratory: No difficulty in breathing or cough. Cardiovascular: No chest pain, no palpitations, no shortness of breath  Gastrointestinal: No abdominal pain or change in bowel movements. Genitourinary: No change in urinary frequency or dysuria. Skin: No rashes or skin lesions. Neurological: No weakness. No headaches. Last Filed Vitals:  /86   Pulse 81   Temp 97.9 °F (36.6 °C) (Temporal)   Resp 18   Wt 174 lb (78.9 kg)   SpO2 98%   BMI 21.75 kg/m²      Physical Examination:     GENERAL APPEARANCE: in no acute distress, well developed, well nourished. HEAD: normocephalic, atraumatic. EYES: extraocular movement intact (EOMI), pupils equal, round, reactive to light and accommodation. EARS: normal, tympanic membrane intact, clear, auditory canal clear. NOSE: nares patent, no erythema, sinuses nontender bilaterally, no rhinorrhea. ORAL CAVITY: mucosa moist, no lesions. THROAT: clear, no mass, no exudate. NECK/THYROID: neck supple, full range of motion, no thyromegaly. HEART: no murmurs, regular rate and rhythm, S1, S2 normal.   LUNGS: clear to auscultation bilaterally, no wheezes, rales, rhonchi. ABDOMEN: normal, bowel sounds present, soft, nontender, nondistended, no rebound guarding or rigidity    Recent Labs/ In Office Testing/ Radiograph review:     Hospital Outpatient Visit on 10/02/2020   Component Date Value Ref Range Status    Specimen Source 10/02/2020 . RECTAL SWAB   Final    Fluoroquinolone Resistant Org Cult 10/02/2020 CULTURE NEGATIVE FOR FLUOROQUINOLONE RESISTANT ORGANISMS   Final    Comment:        PERFORMED AT [facility]  [address] [place] [address]  [id number]         No results found for this visit on 11/19/20. Assessment/Plan:     Priscila Santoro was seen today for hypertension, hyperlipidemia and diabetes.     Diagnoses and all orders for this visit:    Type 2 diabetes mellitus with hyperglycemia, with long-term current use of insulin (HCC)  -     Hemoglobin A1C; Future  -     Microalbumin, Ur; Future  -     Blood Glucose Monitoring Suppl (ONE TOUCH ULTRA 2) w/Device KIT; Check blood sugar four times daily  -     blood glucose test strips (EXACTECH TEST) strip; 1 each by In Vitro route 4 times daily As needed. HTN, goal below 130/80  -     Magnesium; Future  -     Renal Function Panel; Future    Dyslipidemia  -     ALT; Future  -     AST; Future  -     CBC Auto Differential; Future  -     Lipid Panel; Future    BMI 20.0-20.9, adult    Follow up on labs. Encouraged well balanced low carb diet. Encouraged 150 mins of aerobic activity weekly. Encouraged regular follow up with his specialists. New rx for glucometer and test strips provided as above. All questions answered and addressed to patient satisfaction. Patient understands and agrees to the plan. The patient was evaluated and treated today based on the osteopathic principle that each person is a unit of body, mind, and spirit, the body is capable of self-regulation, self-healing, and health maintenance and that structure and function are reciprocally interrelated. Follow-up:   Return in about 3 months (around 2/19/2021) for dm/htn/chol; 20 min appt.       Mike Akhtar D.O.

## 2020-12-17 RX ORDER — GABAPENTIN 100 MG/1
100 CAPSULE ORAL 2 TIMES DAILY
Qty: 180 CAPSULE | Refills: 0 | Status: SHIPPED | OUTPATIENT
Start: 2020-12-17 | End: 2020-12-21

## 2020-12-17 NOTE — TELEPHONE ENCOUNTER
Maye Ross is calling to request a refill on the following medication(s):    Medication Request:  Requested Prescriptions     Pending Prescriptions Disp Refills    gabapentin (NEURONTIN) 100 MG capsule 180 capsule 0     Sig: Take 1 capsule by mouth 2 times daily for 90 days.        Last Visit Date (If Applicable):  37/99/9626    Next Visit Date:    2/23/2021

## 2020-12-21 RX ORDER — FAMOTIDINE 20 MG/1
TABLET, FILM COATED ORAL
Qty: 60 TABLET | Refills: 3 | Status: SHIPPED | OUTPATIENT
Start: 2020-12-21 | End: 2021-04-05

## 2020-12-21 RX ORDER — TAMSULOSIN HYDROCHLORIDE 0.4 MG/1
0.4 CAPSULE ORAL DAILY
Qty: 90 CAPSULE | Refills: 0 | Status: SHIPPED | OUTPATIENT
Start: 2020-12-21 | End: 2021-04-02

## 2020-12-21 RX ORDER — ATORVASTATIN CALCIUM 40 MG/1
TABLET, FILM COATED ORAL
Qty: 90 TABLET | Refills: 0 | Status: SHIPPED | OUTPATIENT
Start: 2020-12-21 | End: 2021-05-03

## 2020-12-21 RX ORDER — GABAPENTIN 100 MG/1
100 CAPSULE ORAL 2 TIMES DAILY
Qty: 180 CAPSULE | Refills: 0 | Status: SHIPPED | OUTPATIENT
Start: 2020-12-21 | End: 2021-04-05

## 2020-12-21 RX ORDER — BLOOD-GLUCOSE METER
KIT MISCELLANEOUS
Qty: 50 EACH | Refills: 5 | Status: SHIPPED | OUTPATIENT
Start: 2020-12-21 | End: 2021-06-22

## 2020-12-21 RX ORDER — PSEUDOEPHED/ACETAMINOPH/DIPHEN 30MG-500MG
TABLET ORAL
Qty: 540 TABLET | Refills: 1 | Status: SHIPPED | OUTPATIENT
Start: 2020-12-21 | End: 2021-02-23

## 2020-12-21 RX ORDER — PEN NEEDLE, DIABETIC 31 G X1/4"
NEEDLE, DISPOSABLE MISCELLANEOUS
Qty: 100 EACH | Refills: 3 | Status: SHIPPED | OUTPATIENT
Start: 2020-12-21 | End: 2021-06-22

## 2020-12-21 NOTE — TELEPHONE ENCOUNTER
Chaparrita Lozano is calling to request a refill on the following medication(s):    Medication Request:  Requested Prescriptions     Pending Prescriptions Disp Refills    famotidine (PEPCID) 20 MG tablet [Pharmacy Med Name: FAMOTIDINE 20MG TAB] 60 tablet 3     Sig: TAKE 1 TABLET BY MOUTH TWICE A DAY       Last Visit Date (If Applicable):  02/04/4013    Next Visit Date:    2/23/2021

## 2020-12-21 NOTE — TELEPHONE ENCOUNTER
Dolores Henry is calling to request a refill on the following medication(s):    Medication Request:  Requested Prescriptions     Pending Prescriptions Disp Refills    ACETAMINOPHEN EXTRA STRENGTH 500 MG tablet [Pharmacy Med Name: ACETAMINOPHE 500MG TAB] 540 tablet 1     Sig: TAKE 2 TABLETS BY MOUTH THREE TIMES A DAY    atorvastatin (LIPITOR) 40 MG tablet [Pharmacy Med Name: ATORVASTATIN 40MG TAB] 90 tablet 0     Sig: TAKE 1 TABLET BY MOUTH DAILY    FREESTYLE LITE strip [Pharmacy Med Name: FREESTYLE LITE DEX] 50 each      Sig: USE TO TEST TWICE A DAY AS DIRECTED    tamsulosin (FLOMAX) 0.4 MG capsule [Pharmacy Med Name: TAMSULOSIN 0.4MG CAP] 90 capsule 0     Sig: TAKE 1 CAPSULE BY MOUTH DAILY    gabapentin (NEURONTIN) 100 MG capsule [Pharmacy Med Name: GABAPENTIN 100MG CAP] 180 capsule 0     Sig: TAKE 1 CAPSULE BY MOUTH 2 TIMES DAILY FOR 90 DAYS.        Last Visit Date (If Applicable):  47/07/5714    Next Visit Date:    2/23/2021

## 2021-01-15 ENCOUNTER — TELEPHONE (OUTPATIENT)
Dept: FAMILY MEDICINE CLINIC | Age: 71
End: 2021-01-15

## 2021-01-15 NOTE — TELEPHONE ENCOUNTER
We received letter from [employer] stating that they would not cover his humalog. Called to notify pt, he has paperwork that they sent him that he is bringing in for us to fill out.

## 2021-01-15 NOTE — TELEPHONE ENCOUNTER
Patient called back and said this is the phone number you can call for his RJMetricsalog  [phone].  Option 2 or option 1 he is not sure which one

## 2021-01-19 NOTE — TELEPHONE ENCOUNTER
Pt. Was told to call the insurance and ask which insulin that they will cover. If we try it will be order and miss until we finally get one that they will cover. Him calling cut all of that out.

## 2021-01-20 ENCOUNTER — NURSE TRIAGE (OUTPATIENT)
Dept: OTHER | Facility: CLINIC | Age: 71
End: 2021-01-20

## 2021-01-20 DIAGNOSIS — R97.20 ELEVATED PSA: ICD-10-CM

## 2021-01-20 NOTE — TELEPHONE ENCOUNTER
Reason for Disposition   MODERATE-SEVERE rectal pain (i.e., interferes with school, work, or sleep)    Answer Assessment - Initial Assessment Questions  1. SYMPTOM:  \"What's the main symptom you're concerned about? \" (e.g., pain, itching, swelling, rash)      Pain    2. ONSET: \"When did the pain start? \"      3-4 months ago after prostate    3. RECTAL PAIN: \"Do you have any pain around your rectum? \" \"How bad is the pain? \"  (Scale 1-10; or mild, moderate, severe)   - MILD (1-3): doesn't interfere with normal activities    - MODERATE (4-7): interferes with normal activities or awakens from sleep, limping    - SEVERE (8-10): excruciating pain, unable to have a bowel movement       7    4. RECTAL ITCHING: \"Do you have any itching in this area? \" \"How bad is the itching? \"  (Scale 1-10; or mild, moderate, severe)   - MILD - doesn't interfere with normal activities    - MODERATE-SEVERE: interferes with normal activities or awakens from sleep      No    5. CONSTIPATION: \"Do you have constipation? \" If so, \"How bad is it? \"      No    6. CAUSE: \"What do you think is causing the anus symptoms? \"      Something from prostate exam    7. OTHER SYMPTOMS: \"Do you have any other symptoms? \"  (e.g., rectal bleeding, abdominal pain, vomiting, fever)     Diarrhea    8. PREGNANCY: \"Is there any chance you are pregnant? \" \"When was your last menstrual period? \"      N/A    Protocols used: RECTAL SYMPTOMS-ADULT-[place]    Patient called pre-service center [facility] with red flag complaint. Brief description of triage: pt had prostate exam 3 months ago and has been having rectal pain ever since. Rates a 7 out of 10. Triage indicates for patient to see PCP in office today. Pt advised if no available appts to go to [facility] or walk in clinic. Care advice provided, patient verbalizes understanding; denies any other questions or concerns; instructed to call back for any new or worsening symptoms.     Writer [facility] [place] [facility]. Attention Provider: Thank you for allowing me to participate in the care of your patient. The patient was connected to triage in response to information provided to the E[facility]. Please do not respond through this encounter as the response is not directed to a shared pool.

## 2021-01-22 NOTE — TELEPHONE ENCOUNTER
Per  [address] pt to have Prostate Bx under MAC, pt notified. Informed pt Surgery scheduler will contact pt with time and date.

## 2021-01-26 ENCOUNTER — TELEPHONE (OUTPATIENT)
Dept: UROLOGY | Age: 71
End: 2021-01-26

## 2021-01-26 NOTE — TELEPHONE ENCOUNTER
Prostate bx & us @ [facility] 2/12/21 9:45am **STOP BLOOD THINNERS 02/05/21**   PAT @ [address] [facility] 1/29/21 2:00pm   covid-19 @ [facility] 2/8/21 3:10pm         Spoke with patient, procedure info to be picked up in office

## 2021-01-26 NOTE — TELEPHONE ENCOUNTER
Per  [address] keflex 500 mg TID for 3 days starting the day prior to Prostate Bx. Pt notified verbal understanding given call ended.

## 2021-01-28 ENCOUNTER — OFFICE VISIT (OUTPATIENT)
Dept: FAMILY MEDICINE CLINIC | Age: 71
End: 2021-01-28
Payer: COMMERCIAL

## 2021-01-28 VITALS
SYSTOLIC BLOOD PRESSURE: 120 MMHG | HEART RATE: 76 BPM | TEMPERATURE: 97.3 F | WEIGHT: 176.6 LBS | BODY MASS INDEX: 22.66 KG/M2 | OXYGEN SATURATION: 100 % | HEIGHT: 74 IN | DIASTOLIC BLOOD PRESSURE: 70 MMHG

## 2021-01-28 DIAGNOSIS — K62.89 RECTAL PAIN: Primary | ICD-10-CM

## 2021-01-28 PROCEDURE — 1036F TOBACCO NON-USER: CPT | Performed by: FAMILY MEDICINE

## 2021-01-28 PROCEDURE — 1123F ACP DISCUSS/DSCN MKR DOCD: CPT | Performed by: FAMILY MEDICINE

## 2021-01-28 PROCEDURE — 3017F COLORECTAL CA SCREEN DOC REV: CPT | Performed by: FAMILY MEDICINE

## 2021-01-28 PROCEDURE — G8484 FLU IMMUNIZE NO ADMIN: HCPCS | Performed by: FAMILY MEDICINE

## 2021-01-28 PROCEDURE — G8420 CALC BMI NORM PARAMETERS: HCPCS | Performed by: FAMILY MEDICINE

## 2021-01-28 PROCEDURE — 4040F PNEUMOC VAC/ADMIN/RCVD: CPT | Performed by: FAMILY MEDICINE

## 2021-01-28 PROCEDURE — 99213 OFFICE O/P EST LOW 20 MIN: CPT | Performed by: FAMILY MEDICINE

## 2021-01-28 PROCEDURE — G8427 DOCREV CUR MEDS BY ELIG CLIN: HCPCS | Performed by: FAMILY MEDICINE

## 2021-01-28 ASSESSMENT — PATIENT HEALTH QUESTIONNAIRE - PHQ9
SUM OF ALL RESPONSES TO PHQ9 QUESTIONS 1 & 2: 0
2. FEELING DOWN, DEPRESSED OR HOPELESS: 0
SUM OF ALL RESPONSES TO PHQ QUESTIONS 1-9: 0

## 2021-01-28 NOTE — PROGRESS NOTES
Progress Note    Melly Chong is a 80 y.o.  male who presents today alone for evaluation of   Chief Complaint   Patient presents with    Rectal Pain           HPI:   Patient is here today for same day visit. Patient states he has had rectal pain for the last 3 mos. Patient states he has had rectal pain since a RAMOS with urology. Patient has appt with urology today. Patient states his appt is 2 pm. Patient denies discharge or blood in the stool. Patient states he is having a BM every other day. Health Maintenance Due   Topic Date Due    Hepatitis A vaccine (1 of 2 - Risk 2-dose series) 01/04/1951    Diabetic retinal exam  08/16/2017    Flu vaccine (1) 09/01/2020    Diabetic foot exam  11/22/2020        Current Medications:     Current Outpatient Medications   Medication Sig Dispense Refill    Insulin Pen Needle (TECHLITE PEN NEEDLES) 32G X 6 MM MISC USE AS DIRECTED. WITH INSULIN PEN UP TO 5 TIMES PER  each 3    ACETAMINOPHEN EXTRA STRENGTH 500 MG tablet TAKE 2 TABLETS BY MOUTH THREE TIMES A  tablet 1    atorvastatin (LIPITOR) 40 MG tablet TAKE 1 TABLET BY MOUTH DAILY 90 tablet 0    FREESTYLE LITE strip USE TO TEST TWICE A DAY AS DIRECTED 50 each 5    tamsulosin (FLOMAX) 0.4 MG capsule TAKE 1 CAPSULE BY MOUTH DAILY 90 capsule 0    gabapentin (NEURONTIN) 100 MG capsule TAKE 1 CAPSULE BY MOUTH 2 TIMES DAILY FOR 90 DAYS.  180 capsule 0    Blood Glucose Monitoring Suppl (ONE TOUCH ULTRA 2) w/Device KIT Check blood sugar four times daily 1 kit 0    NIFEdipine (PROCARDIA XL) 90 MG extended release tablet TAKE 1 TABLET BY MOUTH DAILY 90 tablet 1    insulin lispro, 1 Unit Dial, (HUMALOG KWIKPEN) 100 UNIT/ML SOPN INJECT 12 UNITS THREE TIMES A DAY 15 mL 3    Dulaglutide (TRULICITY) 1.5 UP/9.9HL SOPN Inject 1.5 mg into the skin once a week 4 pen 0    metoprolol succinate (TOPROL XL) 50 MG extended release tablet Take 1 tablet by mouth daily 90 tablet 1    Everolimus 0.75 MG TABS       Lancets MISC 1 each by Does not apply route daily 100 each 5    insulin glargine (LANTUS SOLOSTAR) 100 UNIT/ML injection pen Inject 18 Units into the skin 2 times daily 4 pen 5    Insulin Pen Needle (ULTICARE MINI PEN NEEDLES) 31G X 6 MM MISC PATIENT IS TESTING UP TO 5 TIMES DAILY 100 each 3    magnesium oxide (MAG-OX) 400 (241.3 Mg) MG TABS tablet TAKE 1 TABLET BY MOUTH TWICE A DAY 60 tablet 2    insulin lispro (HUMALOG KWIKPEN) 100 UNIT/ML pen Per Sliding scale. Max 28 units per day 3 pen 5    tacrolimus (PROGRAF) 1 MG capsule Take 6 mg by mouth 2 times daily       famotidine (PEPCID) 20 MG tablet TAKE 1 TABLET BY MOUTH TWICE A DAY (Patient not taking: Reported on 1/28/2021) 60 tablet 3    Lancet Device MISC 1 Device by Does not apply route once for 1 dose 100 Device 0    docusate sodium (COLACE) 100 MG capsule Take 1 capsule by mouth 2 times daily as needed for Constipation (Patient not taking: Reported on 1/28/2021) 60 capsule 0     No current facility-administered medications for this visit. Allergies:      Allergies   Allergen Reactions    Amino Acids     Lisinopril         Medical History:     Past Medical History:   Diagnosis Date    Diabetes mellitus ([facility] [address]     Hemodialysis patient [facility])     patient had Kidney transplant    Hepatitis C     Hepatitis C without hepatic coma     Hyperlipidemia     Hypertension     Renal transplant recipient        Past Surgical History:   Procedure Laterality Date    COLONOSCOPY N/A 10/30/2019    COLONOSCOPY WITH BIOPSY performed by Amita Long MD at [address] [place] [address] 11/2015    at [facility] [id number] [facility]  2017       Family History   Problem Relation Age of Onset    Cancer Mother     Cancer Father     Cancer Brother         Social History:     Social History     Socioeconomic History    Marital status:      Spouse name: Not on file    Number of children: Not on file    Years of education: Not on file    Highest education level: Not on file   Occupational History    Not on file   Social Needs    Financial resource strain: Somewhat hard    Food insecurity     Worry: Often true     Inability: Often true    Transportation needs     Medical: Yes     Non-medical: Yes   Tobacco Use    Smoking status: Former Smoker     Packs/day: 0.50     Years: 50.00     Pack years: 25.00     Types: Cigarettes     Quit date: 2013     Years since quittin.8    Smokeless tobacco: Never Used   Substance and Sexual Activity    Alcohol use: No    Drug use: Not Currently     Types: Other-see comments     Comment: used marijuana and crack cocaine previously - last use over one year ago    Sexual activity: Never   Lifestyle    Physical activity     Days per week: Not on file     Minutes per session: Not on file    Stress: Not on file   Relationships    Social connections     Talks on phone: Not on file     Gets together: Not on file     Attends Islam service: Not on file     Active member of club or organization: Not on file     Attends meetings of clubs or organizations: Not on file     Relationship status: Not on file    Intimate partner violence     Fear of current or ex partner: Not on file     Emotionally abused: Not on file     Physically abused: Not on file     Forced sexual activity: Not on file   Other Topics Concern    Not on file   Social History Narrative    Not on file        ROS:     Constitutional: No fevers, chills, fatigue. ENT: No nasal congestion or sore throat  Respiratory: No difficulty in breathing or cough. Cardiovascular: No chest pain, palpitations or shortness of breath  Gastrointestinal: No abdominal pain or change in bowel movements. +rectal pain  Genitourinary: No change in urinary frequency or dysuria. Skin: No rashes or skin lesions. Neurological: No weakness. No headaches.          Last Filed Vitals:  /70   Pulse 76   Temp 97.3 °F (36.3 °C) (Temporal)   Ht 6' 2\" (1.88 m)   Wt 176 lb 9.6 oz (80.1 kg)   SpO2 100%   BMI 22.67 kg/m²      Physical Examination:     GENERAL APPEARANCE: in no acute distress, well developed, well nourished. HEAD: normocephalic, atraumatic. EYES: extraocular movement intact (EOMI), pupils equal, round, reactive to light and accommodation. EARS: normal, tympanic membrane intact, clear, auditory canal clear. NOSE: nares patent, no erythema, sinuses nontender bilaterally, no rhinorrhea. ORAL CAVITY: mucosa moist, no lesions. THROAT: clear, no mass, no exudate. NECK/THYROID: neck supple, full range of motion, no thyromegaly. HEART: no murmurs, regular rate and rhythm, S1, S2 normal.   LUNGS: clear to auscultation bilaterally, no wheezes, rales, rhonchi. ABDOMEN: normal, bowel sounds present, soft, nontender, nondistended, no rebound guarding or rigidity  RAMOS: non-inflamed or thrombosed small external hemorrhoid at the 5 o'clock position; no masses or anal fissures visualized. No gross blood. Recent Labs/ In Office Testing/ Radiograph review:     Hospital Outpatient Visit on 10/02/2020   Component Date Value Ref Range Status    Specimen Source 10/02/2020 . RECTAL SWAB   Final    Fluoroquinolone Resistant Org Cult 10/02/2020 CULTURE NEGATIVE FOR FLUOROQUINOLONE RESISTANT ORGANISMS   Final    Comment:        PERFORMED AT [facility]  [address] [place] [address]         No results found for this visit on 01/28/21. Assessment/Plan:     Luis Noe was seen today for rectal pain. Diagnoses and all orders for this visit:    Rectal pain    He has appt with urology today. Would recommend he discuss this further with them as I do not see any abnormalities on exam and the discomfort started after his last appt with urology. If it continues and urology has no further recommendations, will have him see GI. Encouraged 50-60 oz of water per day. Encouraged daily fiber supplement. All questions answered and addressed to patient satisfaction. Patient understands and agrees to the plan. The patient was evaluated and treated today based on the osteopathic principle that each person is a unit of body, mind, and spirit, the body is capable of self-regulation, self-healing, and health maintenance and that structure and function are reciprocally interrelated. Follow-up:   Return in about 1 month (around 2/28/2021) for dm/htn/chol; 20 min appt.       Rafael Berry D.O.

## 2021-01-29 ENCOUNTER — HOSPITAL ENCOUNTER (OUTPATIENT)
Dept: PREADMISSION TESTING | Age: 71
Discharge: HOME | End: 2021-02-02
Payer: COMMERCIAL

## 2021-01-29 VITALS
DIASTOLIC BLOOD PRESSURE: 81 MMHG | HEIGHT: 74 IN | BODY MASS INDEX: 22.72 KG/M2 | OXYGEN SATURATION: 100 % | WEIGHT: 177 LBS | TEMPERATURE: 97.8 F | HEART RATE: 81 BPM | SYSTOLIC BLOOD PRESSURE: 141 MMHG | RESPIRATION RATE: 18 BRPM

## 2021-01-29 LAB
ABSOLUTE EOS #: 0.23 K/UL (ref 0–0.4)
ABSOLUTE IMMATURE GRANULOCYTE: ABNORMAL K/UL (ref 0–0.3)
ABSOLUTE LYMPH #: 0.8 K/UL (ref 1–4.8)
ABSOLUTE MONO #: 0.35 K/UL (ref 0.1–1.3)
ANION GAP SERPL CALCULATED.3IONS-SCNC: 9 MMOL/L (ref 9–17)
BASOPHILS # BLD: 0 % (ref 0–2)
BASOPHILS ABSOLUTE: 0 K/UL (ref 0–0.2)
BUN BLDV-MCNC: 41 MG/DL (ref 8–23)
BUN/CREAT BLD: ABNORMAL (ref 9–20)
CALCIUM SERPL-MCNC: 9 MG/DL (ref 8.6–10.4)
CHLORIDE BLD-SCNC: 110 MMOL/L (ref 98–107)
CO2: 22 MMOL/L (ref 20–31)
CREAT SERPL-MCNC: 2.68 MG/DL (ref 0.7–1.2)
DIFFERENTIAL TYPE: ABNORMAL
EOSINOPHILS RELATIVE PERCENT: 9 % (ref 0–4)
GFR AFRICAN AMERICAN: 29 ML/MIN
GFR NON-AFRICAN AMERICAN: 24 ML/MIN
GFR SERPL CREATININE-BSD FRML MDRD: ABNORMAL ML/MIN/{1.73_M2}
GFR SERPL CREATININE-BSD FRML MDRD: ABNORMAL ML/MIN/{1.73_M2}
GLUCOSE BLD-MCNC: 118 MG/DL (ref 70–99)
HCT VFR BLD CALC: 33.8 % (ref 41–53)
HEMOGLOBIN: 11.3 G/DL (ref 13.5–17.5)
IMMATURE GRANULOCYTES: ABNORMAL %
LYMPHOCYTES # BLD: 32 % (ref 24–44)
MCH RBC QN AUTO: 30 PG (ref 26–34)
MCHC RBC AUTO-ENTMCNC: 33.5 G/DL (ref 31–37)
MCV RBC AUTO: 89.6 FL (ref 80–100)
MONOCYTES # BLD: 14 % (ref 1–7)
MORPHOLOGY: NORMAL
NRBC AUTOMATED: ABNORMAL PER 100 WBC
PDW BLD-RTO: 14.3 % (ref 11.5–14.9)
PLATELET # BLD: 129 K/UL (ref 150–450)
PLATELET ESTIMATE: ABNORMAL
PMV BLD AUTO: 9.1 FL (ref 6–12)
POTASSIUM SERPL-SCNC: 5.1 MMOL/L (ref 3.7–5.3)
RBC # BLD: 3.78 M/UL (ref 4.5–5.9)
RBC # BLD: ABNORMAL 10*6/UL
SEG NEUTROPHILS: 45 % (ref 36–66)
SEGMENTED NEUTROPHILS ABSOLUTE COUNT: 1.12 K/UL (ref 1.3–9.1)
SODIUM BLD-SCNC: 141 MMOL/L (ref 135–144)
WBC # BLD: 2.5 K/UL (ref 3.5–11)
WBC # BLD: ABNORMAL 10*3/UL

## 2021-01-29 PROCEDURE — 80048 BASIC METABOLIC PNL TOTAL CA: CPT

## 2021-01-29 PROCEDURE — 85025 COMPLETE CBC W/AUTO DIFF WBC: CPT

## 2021-01-29 PROCEDURE — 36415 COLL VENOUS BLD VENIPUNCTURE: CPT

## 2021-01-29 RX ORDER — CEPHALEXIN 500 MG/1
500 CAPSULE ORAL 2 TIMES DAILY
Qty: 6 CAPSULE | Refills: 0 | Status: SHIPPED | OUTPATIENT
Start: 2021-01-29 | End: 2021-02-01

## 2021-01-29 RX ORDER — LANOLIN ALCOHOL/MO/W.PET/CERES
1000 CREAM (GRAM) TOPICAL DAILY
COMMUNITY
End: 2021-05-04

## 2021-01-29 SDOH — HEALTH STABILITY: MENTAL HEALTH: HOW MANY STANDARD DRINKS CONTAINING ALCOHOL DO YOU HAVE ON A TYPICAL DAY?: NOT ASKED

## 2021-01-29 ASSESSMENT — ENCOUNTER SYMPTOMS
DIARRHEA: 1
VOMITING: 0
NAUSEA: 0
ANAL BLEEDING: 0
SHORTNESS OF BREATH: 0
WHEEZING: 0
SORE THROAT: 0
ABDOMINAL DISTENTION: 0
COUGH: 0
ABDOMINAL PAIN: 1
RECTAL PAIN: 1
CONSTIPATION: 0
BLOOD IN STOOL: 0

## 2021-01-29 NOTE — H&P (VIEW-ONLY)
HLD:  Current medications r/t condition: LIPITOR 40 MG QD    HTN, LVH: Patient states BP is well controlled- he does follow w/cardiologist. C/o palpitations- has had for the past 4-5 months- currently wearing Holter monitor. Does note b/l leg swelling, notes more at night, improved in the AM, seem to swell w/being on his feet. Denies SOB, denies dizziness. Current medications r/t condition: PROCARDIA XL 90 MG QD, TOPROL XL 50 MG QD  BP Readings from Last 3 Encounters:   01/29/21 (!) 141/81   01/28/21 120/70   11/19/20 130/86     HEPATITIS C: States he was treated for this previously and is \"cured\". Lab Results   Component Value Date    ALKPHOS 111 03/24/2020    ALT 35 08/14/2020    AST 30 08/14/2020    PROT 7.2 03/24/2020    PROT 6.4 12/27/2018    BILITOT 0.46 03/24/2020    BILIDIR 0.2 12/27/2018    LABALBU 4.8 08/14/2020    LABALBU 4.1 12/27/2018    LABALBU 3.7 11/04/2015     HEMODIALYSIS PATIENT/HX OF RENAL TRANSPLANT/IMMUNOCOMPROMISED STATUS: Hx of kidney transplant about 4 years ago- kidney disease possibly r/t hx of drug abuse, DM. Was previously on dialysis TID, still has fistula to to right A/C. He does con't to follow w/kidney specialist. Denies any issues w/right sided kidney tx. Current medications r/t condition: EVEROLIMUS 0.75 mg- takes 3 tabs, 2 times/day, PROGRAF 6 mg BID    LUNG MASS: Monitoring. HX OF PNA: Denies cough, SOB, wheezing currently. Anemia:  Lab Results   Component Value Date    WBC 4.4 08/14/2020    HGB 11.7 (L) 08/14/2020    HCT 36.9 (L) 08/14/2020    MCV 94.9 08/14/2020     08/14/2020     Denies hx of MRSA infection. Denies hx of any complications w/anesthesia.    PAST MEDICAL HISTORY     Past Medical History:   Diagnosis Date    CAD (coronary artery disease)     Diabetes mellitus ([facility] [address]     Elevated PSA, between 10 and less than 20 ng/ml     End stage kidney disease ([address]     Hemodialysis patient [facility])     patient had Kidney transplant    Hepatitis C without hepatic coma     Hyperlipidemia     Hypertension     Irregular heart rate     wearing holter monitor 21    Palpitations     Renal transplant recipient 2017    [facility]       SURGICAL HISTORY       Past Surgical History:   Procedure Laterality Date    CARDIAC CATHETERIZATION      Patient states normal    COLONOSCOPY N/A 10/30/2019    COLONOSCOPY WITH BIOPSY performed by Dylon Jessica MD at [address] [facility] 2015    at [facility] [address]    left inguinal    KIDNEY TRANSPLANT  2017    right       SOCIAL HISTORY       Social History     Socioeconomic History    Marital status:      Spouse name: None    Number of children: None    Years of education: None    Highest education level: None   Occupational History    None   Social Needs    Financial resource strain: Somewhat hard    Food insecurity     Worry: Often true     Inability: Often true    Transportation needs     Medical: Yes     Non-medical: Yes   Tobacco Use    Smoking status: Former Smoker     Packs/day: 0.50     Years: 50.00     Pack years: 25.00     Types: Cigarettes     Quit date: 2013     Years since quittin.8    Smokeless tobacco: Never Used    Tobacco comment: No smoking in at least 3 years   Substance and Sexual Activity    Alcohol use: No     Comment: No ETOH in 3 years    Drug use: Not Currently     Types:  Other-see comments     Comment: Hx of marijuana and crack cocaine previously - last use was about 3 years ago as of 22    Sexual activity: Never   Lifestyle    Physical activity     Days per week: None     Minutes per session: None    Stress: None   Relationships    Social connections     Talks on phone: None     Gets together: None     Attends Druze service: None     Active member of club or organization: None     Attends meetings of clubs or organizations: None     Relationship status: None    Intimate partner violence     Fear of current or ex partner: None     Emotionally abused: None     Physically abused: None     Forced sexual activity: None   Other Topics Concern    None   Social History Narrative    None       REVIEW OF SYSTEMS      Allergies   Allergen Reactions    Amino Acids     Lisinopril        Current Outpatient Medications on File Prior to Encounter   Medication Sig Dispense Refill    Vitamin E 180 MG capsule Take 180 mg by mouth daily      vitamin B-12 (CYANOCOBALAMIN) 1000 MCG tablet Take 1,000 mcg by mouth daily      Ascorbic Acid (VITAMIN C ADULT GUMMIES PO) Take 250 mg by mouth daily      ACETAMINOPHEN EXTRA STRENGTH 500 MG tablet TAKE 2 TABLETS BY MOUTH THREE TIMES A  tablet 1    atorvastatin (LIPITOR) 40 MG tablet TAKE 1 TABLET BY MOUTH DAILY 90 tablet 0    tamsulosin (FLOMAX) 0.4 MG capsule TAKE 1 CAPSULE BY MOUTH DAILY 90 capsule 0    gabapentin (NEURONTIN) 100 MG capsule TAKE 1 CAPSULE BY MOUTH 2 TIMES DAILY FOR 90 DAYS. 180 capsule 0    famotidine (PEPCID) 20 MG tablet TAKE 1 TABLET BY MOUTH TWICE A DAY 60 tablet 3    NIFEdipine (PROCARDIA XL) 90 MG extended release tablet TAKE 1 TABLET BY MOUTH DAILY 90 tablet 1    Dulaglutide (TRULICITY) 1.5 WX/8.2CY SOPN Inject 1.5 mg into the skin once a week 4 pen 0    metoprolol succinate (TOPROL XL) 50 MG extended release tablet Take 1 tablet by mouth daily 90 tablet 1    Everolimus 0.75 MG TABS 2 times daily 3 tabs 2 times daily      insulin glargine (LANTUS SOLOSTAR) 100 UNIT/ML injection pen Inject 18 Units into the skin 2 times daily 4 pen 5    magnesium oxide (MAG-OX) 400 (241.3 Mg) MG TABS tablet TAKE 1 TABLET BY MOUTH TWICE A DAY 60 tablet 2    insulin lispro (HUMALOG KWIKPEN) 100 UNIT/ML pen Per Sliding scale.  Max 28 units per day 3 pen 5    tacrolimus (PROGRAF) 1 MG capsule Take 6 mg by mouth 2 times daily       cephALEXin (KEFLEX) 500 MG capsule Take 1 capsule by mouth 2 times daily for 3 days Starting the day prior to Prostate Bx 6 capsule 0    Insulin Pen Needle (TECHLITE PEN NEEDLES) 32G X 6 MM MISC USE AS DIRECTED. WITH INSULIN PEN UP TO 5 TIMES PER  each 3    FREESTYLE LITE strip USE TO TEST TWICE A DAY AS DIRECTED 50 each 5    Blood Glucose Monitoring Suppl (ONE TOUCH ULTRA 2) w/Device KIT Check blood sugar four times daily 1 kit 0    Lancets MISC 1 each by Does not apply route daily 100 each 5    Lancet Device MISC 1 Device by Does not apply route once for 1 dose 100 Device 0    Insulin Pen Needle (ULTICARE MINI PEN NEEDLES) 31G X 6 MM MISC PATIENT IS TESTING UP TO 5 TIMES DAILY 100 each 3     No current facility-administered medications on file prior to encounter. Review of Systems   Constitutional: Negative for chills and fever. HENT: Positive for postnasal drip (Sniffles at times). Negative for congestion, ear pain and sore throat. Respiratory: Negative for cough, shortness of breath and wheezing. Cardiovascular: Positive for palpitations and leg swelling. Negative for chest pain. Gastrointestinal: Positive for abdominal pain, diarrhea and rectal pain (Since RAMOS w/urologist). Negative for abdominal distention (\"Hardly ever\"), anal bleeding, blood in stool, constipation, nausea and vomiting. Genitourinary: Positive for decreased urine volume, difficulty urinating and frequency. Negative for discharge, dysuria, flank pain, hematuria, penile pain, penile swelling, scrotal swelling and testicular pain. Neurological: Positive for headaches (Once in a while). Negative for dizziness. Hematological: Does not bruise/bleed easily. GENERAL PHYSICAL EXAM     Vitals: BP (!) 141/81   Pulse 81   Temp 97.8 °F (36.6 °C)   Resp 18   Ht 6' 2\" (1.88 m)   Wt 177 lb (80.3 kg)   SpO2 100%   BMI 22.73 kg/m²               Physical Exam   Constitutional: He is oriented to person, place, and time. He appears well-developed and well-nourished. Non-toxic appearance. He does not have a sickly appearance.  He does not appear ill. No distress. HENT:   Head: Normocephalic. Right Ear: Tympanic membrane, external ear and ear canal normal.   Left Ear: Tympanic membrane, external ear and ear canal normal.   Nose: Nose normal.   Mouth/Throat: Oropharynx is clear and moist and mucous membranes are normal. No oropharyngeal exudate, posterior oropharyngeal edema, posterior oropharyngeal erythema or tonsillar abscesses. Eyes: Pupils are equal, round, and reactive to light. Conjunctivae are normal. Right eye exhibits no discharge. Left eye exhibits no discharge. Neck: Neck supple. Cardiovascular: Normal rate, regular rhythm, normal heart sounds and intact distal pulses. Pulses:       Radial pulses are 2+ on the right side and 2+ on the left side. Dorsalis pedis pulses are 2+ on the right side and 2+ on the left side. Posterior tibial pulses are 2+ on the right side and 2+ on the left side. Pulmonary/Chest: Effort normal and breath sounds normal. No respiratory distress. He has no wheezes. He has no rales. Abdominal: Soft. Bowel sounds are normal. He exhibits no distension and no mass. There is no abdominal tenderness. There is no rebound and no guarding. Musculoskeletal: Normal range of motion. Right lower leg: He exhibits no tenderness and no swelling. Left lower leg: He exhibits no tenderness and no swelling. Lymphadenopathy:     He has no cervical adenopathy. Neurological: He is alert and oriented to person, place, and time. Skin: Skin is warm and dry. He is not diaphoretic. Fistula to right a/c, + bruit/thrill. Psychiatric: He has a normal mood and affect. His behavior is normal.   Vitals reviewed.       SURGERY / PROVISIONAL DIAGNOSES:      CYSTOSCOPY PROSTATE BIOPSY W/ULTRSOUND & TECH TO OR     ELEVATED PSA    Patient Active Problem List    Diagnosis Date Noted    [facility] 03/23/2020     Priority: High    Elevated C-reactive protein (CRP) 03/23/2020     Priority: Medium  Elevated LDH 03/23/2020     Priority: Medium    Normochromic normocytic anemia 03/23/2020     Priority: Medium    Abdominal pain 07/30/2020    Dizziness 07/30/2020    Fever 07/30/2020    Left ventricular hypertrophy 07/30/2020    Lung mass 07/30/2020    Pneumonia due to infectious organism 07/30/2020    Moderate protein-calorie malnutrition ([address] 03/24/2020    Acute kidney injury ([facility] [address] 03/23/2020    General patient noncompliance 09/12/2019    Hyperlipidemia 09/12/2019    Tobacco user 09/12/2019    Chronic diarrhea 09/12/2019    Encounter for colonoscopy due to history of adenomatous colonic polyps 08/13/2018    Family hx of colon cancer requiring screening colonoscopy 08/13/2018    History of renal transplant 06/13/2018    Benign essential HTN 02/23/2017    Coronary arteriosclerosis 06/30/2016    End-stage renal disease ([address] 10/28/2015    Elevated serum cholesterol 04/23/2012    Renal insufficiency syndrome 04/23/2012    Chronic hepatitis C ([facility] [address] 04/23/2012    Controlled type 2 diabetes mellitus with stage 2 chronic kidney disease, with long-term current use of insulin ([address] 01/01/1986           Roberth Jenkins, APRN - CNP on 1/29/2021 at 3:36 PM

## 2021-01-29 NOTE — PROGRESS NOTES
Dr. Balwinder Wyman, anesthesia, was contacted and informed of patient's history and planned surgery. Orders received and no clearance required.

## 2021-01-29 NOTE — H&P
HISTORY and [facility] [id number]       NAME:  Malcom Mendez  MRN: [id number]   YOB: 1950   Date: 1/29/2021   Age: 80 y.o. Gender: male     COMPLAINT AND PRESENT HISTORY:   Malcom Mendez is 80 y.o., [place]  male, presents for pre-anesthesia/admission testing for [address] per Dr. Tanvir Vega. Primary dx: ELEVATED PSA. HPI:  Notes per Dr. Stephy Israel, 10-2-20:  HPI  He is here for elevated PSA/. He was recently found to have an elevated PSA of 12. He is voiding well. He has never had a prostate infections. He has never had a prostate biopsy. No family history of prostate cancer. Assessment and Plan  1. Elevated PSA   Plan:  PSa has gone up quite a bit in the last 2 years. Will repeat PSA to make sure elevation is real.   If so , will need a prostate biopsy. Would do under MAC. Patient states that starting stream of urine is somewhat slow at times- noted about a week ago. Denies dysuria, but does urinate throughout the night about every hour over the past urine. Denies blood in urine. Denies fever/chills. Sometimes notes lower ABD pain, comes and goes. Sometimes notes diarrhea- has had diarrhea the past 3 days, sometimes once/day, sometimes every other day- the past 3 days, has noted twice/day, up to 3 times/days. Denies blood in stool. Denies nausea/vomiting. Denies flank pain. Noted per med list, he is on Flomax daily. Testing completed r/t condition:  Lab Results   Component Value Date    PSA 12.22 (H) 08/14/2020    PSA 3.2 02/16/2018    PSA 2.4 05/02/2017     Review of additional significant medical hx:  DM II:  Current medications r/t condition: TRULICITY 1.5 MG WEEKLY, LANTUS 18 U BID, HUMALOG PER SS (2-3 times/day w/each meal)- checks BS's about 4 times/day- average BS is 110.   Lab Results   Component Value Date    LABA1C 7.9 (H) 08/14/2020     Lab Results   Component Value Date     08/14/2020 HLD:  Current medications r/t condition: LIPITOR 40 MG QD    HTN, LVH: Patient states BP is well controlled- he does follow w/cardiologist. C/o palpitations- has had for the past 4-5 months- currently wearing Holter monitor. Does note b/l leg swelling, notes more at night, improved in the AM, seem to swell w/being on his feet. Denies SOB, denies dizziness. Current medications r/t condition: PROCARDIA XL 90 MG QD, TOPROL XL 50 MG QD  BP Readings from Last 3 Encounters:   01/29/21 (!) 141/81   01/28/21 120/70   11/19/20 130/86     HEPATITIS C: States he was treated for this previously and is \"cured\". Lab Results   Component Value Date    ALKPHOS 111 03/24/2020    ALT 35 08/14/2020    AST 30 08/14/2020    PROT 7.2 03/24/2020    PROT 6.4 12/27/2018    BILITOT 0.46 03/24/2020    BILIDIR 0.2 12/27/2018    LABALBU 4.8 08/14/2020    LABALBU 4.1 12/27/2018    LABALBU 3.7 11/04/2015     HEMODIALYSIS PATIENT/HX OF RENAL TRANSPLANT/IMMUNOCOMPROMISED STATUS: Hx of kidney transplant about 4 years ago- kidney disease possibly r/t hx of drug abuse, DM. Was previously on dialysis TID, still has fistula to to right A/C. He does con't to follow w/kidney specialist. Denies any issues w/right sided kidney tx. Current medications r/t condition: EVEROLIMUS 0.75 mg- takes 3 tabs, 2 times/day, PROGRAF 6 mg BID    LUNG MASS: Monitoring. HX OF PNA: Denies cough, SOB, wheezing currently. Anemia:  Lab Results   Component Value Date    WBC 4.4 08/14/2020    HGB 11.7 (L) 08/14/2020    HCT 36.9 (L) 08/14/2020    MCV 94.9 08/14/2020     08/14/2020     Denies hx of MRSA infection. Denies hx of any complications w/anesthesia.    PAST MEDICAL HISTORY     Past Medical History:   Diagnosis Date    CAD (coronary artery disease)     Diabetes mellitus [facility]     Elevated PSA, between 10 and less than 20 ng/ml     End stage kidney disease ([address]     Hemodialysis patient [facility])     patient had Kidney transplant    Hepatitis C without hepatic coma     Hyperlipidemia     Hypertension     Irregular heart rate     wearing holter monitor 21    Palpitations     Renal transplant recipient 2017    [facility]       SURGICAL HISTORY       Past Surgical History:   Procedure Laterality Date    CARDIAC CATHETERIZATION      Patient states normal    COLONOSCOPY N/A 10/30/2019    COLONOSCOPY WITH BIOPSY performed by Edi Arizmendi MD at [address] [facility]    at [facility] [address]    left inguinal    KIDNEY TRANSPLANT  2017    right       SOCIAL HISTORY       Social History     Socioeconomic History    Marital status:      Spouse name: None    Number of children: None    Years of education: None    Highest education level: None   Occupational History    None   Social Needs    Financial resource strain: Somewhat hard    Food insecurity     Worry: Often true     Inability: Often true    Transportation needs     Medical: Yes     Non-medical: Yes   Tobacco Use    Smoking status: Former Smoker     Packs/day: 0.50     Years: 50.00     Pack years: 25.00     Types: Cigarettes     Quit date: 2013     Years since quittin.8    Smokeless tobacco: Never Used    Tobacco comment: No smoking in at least 3 years   Substance and Sexual Activity    Alcohol use: No     Comment: No ETOH in 3 years    Drug use: Not Currently     Types:  Other-see comments     Comment: Hx of marijuana and crack cocaine previously - last use was about 3 years ago as of 22    Sexual activity: Never   Lifestyle    Physical activity     Days per week: None     Minutes per session: None    Stress: None   Relationships    Social connections     Talks on phone: None     Gets together: None     Attends Restoration service: None     Active member of club or organization: None     Attends meetings of clubs or organizations: None     Relationship status: None    Intimate partner violence     Fear of current or ex partner: None     Emotionally abused: None     Physically abused: None     Forced sexual activity: None   Other Topics Concern    None   Social History Narrative    None       REVIEW OF SYSTEMS      Allergies   Allergen Reactions    Amino Acids     Lisinopril        Current Outpatient Medications on File Prior to Encounter   Medication Sig Dispense Refill    Vitamin E 180 MG capsule Take 180 mg by mouth daily      vitamin B-12 (CYANOCOBALAMIN) 1000 MCG tablet Take 1,000 mcg by mouth daily      Ascorbic Acid (VITAMIN C ADULT GUMMIES PO) Take 250 mg by mouth daily      ACETAMINOPHEN EXTRA STRENGTH 500 MG tablet TAKE 2 TABLETS BY MOUTH THREE TIMES A  tablet 1    atorvastatin (LIPITOR) 40 MG tablet TAKE 1 TABLET BY MOUTH DAILY 90 tablet 0    tamsulosin (FLOMAX) 0.4 MG capsule TAKE 1 CAPSULE BY MOUTH DAILY 90 capsule 0    gabapentin (NEURONTIN) 100 MG capsule TAKE 1 CAPSULE BY MOUTH 2 TIMES DAILY FOR 90 DAYS. 180 capsule 0    famotidine (PEPCID) 20 MG tablet TAKE 1 TABLET BY MOUTH TWICE A DAY 60 tablet 3    NIFEdipine (PROCARDIA XL) 90 MG extended release tablet TAKE 1 TABLET BY MOUTH DAILY 90 tablet 1    Dulaglutide (TRULICITY) 1.5 JR/3.1AH SOPN Inject 1.5 mg into the skin once a week 4 pen 0    metoprolol succinate (TOPROL XL) 50 MG extended release tablet Take 1 tablet by mouth daily 90 tablet 1    Everolimus 0.75 MG TABS 2 times daily 3 tabs 2 times daily      insulin glargine (LANTUS SOLOSTAR) 100 UNIT/ML injection pen Inject 18 Units into the skin 2 times daily 4 pen 5    magnesium oxide (MAG-OX) 400 (241.3 Mg) MG TABS tablet TAKE 1 TABLET BY MOUTH TWICE A DAY 60 tablet 2    insulin lispro (HUMALOG KWIKPEN) 100 UNIT/ML pen Per Sliding scale.  Max 28 units per day 3 pen 5    tacrolimus (PROGRAF) 1 MG capsule Take 6 mg by mouth 2 times daily       cephALEXin (KEFLEX) 500 MG capsule Take 1 capsule by mouth 2 times daily for 3 days Starting the day prior to Prostate Bx 6 capsule 0    Insulin Pen Needle (TECHLITE PEN NEEDLES) 32G X 6 MM MISC USE AS DIRECTED. WITH INSULIN PEN UP TO 5 TIMES PER  each 3    FREESTYLE LITE strip USE TO TEST TWICE A DAY AS DIRECTED 50 each 5    Blood Glucose Monitoring Suppl (ONE TOUCH ULTRA 2) w/Device KIT Check blood sugar four times daily 1 kit 0    Lancets MISC 1 each by Does not apply route daily 100 each 5    Lancet Device MISC 1 Device by Does not apply route once for 1 dose 100 Device 0    Insulin Pen Needle (ULTICARE MINI PEN NEEDLES) 31G X 6 MM MISC PATIENT IS TESTING UP TO 5 TIMES DAILY 100 each 3     No current facility-administered medications on file prior to encounter. Review of Systems   Constitutional: Negative for chills and fever. HENT: Positive for postnasal drip (Sniffles at times). Negative for congestion, ear pain and sore throat. Respiratory: Negative for cough, shortness of breath and wheezing. Cardiovascular: Positive for palpitations and leg swelling. Negative for chest pain. Gastrointestinal: Positive for abdominal pain, diarrhea and rectal pain (Since RAMOS w/urologist). Negative for abdominal distention (\"Hardly ever\"), anal bleeding, blood in stool, constipation, nausea and vomiting. Genitourinary: Positive for decreased urine volume, difficulty urinating and frequency. Negative for discharge, dysuria, flank pain, hematuria, penile pain, penile swelling, scrotal swelling and testicular pain. Neurological: Positive for headaches (Once in a while). Negative for dizziness. Hematological: Does not bruise/bleed easily. GENERAL PHYSICAL EXAM     Vitals: BP (!) 141/81   Pulse 81   Temp 97.8 °F (36.6 °C)   Resp 18   Ht 6' 2\" (1.88 m)   Wt 177 lb (80.3 kg)   SpO2 100%   BMI 22.73 kg/m²               Physical Exam   Constitutional: He is oriented to person, place, and time. He appears well-developed and well-nourished. Non-toxic appearance. He does not have a sickly appearance.  He does not appear ill. No distress. HENT:   Head: Normocephalic. Right Ear: Tympanic membrane, external ear and ear canal normal.   Left Ear: Tympanic membrane, external ear and ear canal normal.   Nose: Nose normal.   Mouth/Throat: Oropharynx is clear and moist and mucous membranes are normal. No oropharyngeal exudate, posterior oropharyngeal edema, posterior oropharyngeal erythema or tonsillar abscesses. Eyes: Pupils are equal, round, and reactive to light. Conjunctivae are normal. Right eye exhibits no discharge. Left eye exhibits no discharge. Neck: Neck supple. Cardiovascular: Normal rate, regular rhythm, normal heart sounds and intact distal pulses. Pulses:       Radial pulses are 2+ on the right side and 2+ on the left side. Dorsalis pedis pulses are 2+ on the right side and 2+ on the left side. Posterior tibial pulses are 2+ on the right side and 2+ on the left side. Pulmonary/Chest: Effort normal and breath sounds normal. No respiratory distress. He has no wheezes. He has no rales. Abdominal: Soft. Bowel sounds are normal. He exhibits no distension and no mass. There is no abdominal tenderness. There is no rebound and no guarding. Musculoskeletal: Normal range of motion. Right lower leg: He exhibits no tenderness and no swelling. Left lower leg: He exhibits no tenderness and no swelling. Lymphadenopathy:     He has no cervical adenopathy. Neurological: He is alert and oriented to person, place, and time. Skin: Skin is warm and dry. He is not diaphoretic. Fistula to right a/c, + bruit/thrill. Psychiatric: He has a normal mood and affect. His behavior is normal.   Vitals reviewed.       SURGERY / PROVISIONAL DIAGNOSES:      CYSTOSCOPY PROSTATE BIOPSY W/ULTRSOUND & TECH TO OR     ELEVATED PSA    Patient Active Problem List    Diagnosis Date Noted    [facility]osuppressed status ([facility] 03/23/2020     Priority: High    Elevated C-reactive protein (CRP) 03/23/2020     Priority: Medium  Elevated LDH 03/23/2020     Priority: Medium    Normochromic normocytic anemia 03/23/2020     Priority: Medium    Abdominal pain 07/30/2020    Dizziness 07/30/2020    Fever 07/30/2020    Left ventricular hypertrophy 07/30/2020    Lung mass 07/30/2020    Pneumonia due to infectious organism 07/30/2020    Moderate protein-calorie malnutrition ([address] 03/24/2020    Acute kidney injury ([facility] 03/23/2020    General patient noncompliance 09/12/2019    Hyperlipidemia 09/12/2019    Tobacco user 09/12/2019    Chronic diarrhea 09/12/2019    Encounter for colonoscopy due to history of adenomatous colonic polyps 08/13/2018    Family hx of colon cancer requiring screening colonoscopy 08/13/2018    History of renal transplant 06/13/2018    Benign essential HTN 02/23/2017    Coronary arteriosclerosis 06/30/2016    End-stage renal disease ([facility] 10/28/2015    Elevated serum cholesterol 04/23/2012    Renal insufficiency syndrome 04/23/2012    Chronic hepatitis C ([facility] [facility] 04/23/2012    Controlled type 2 diabetes mellitus with stage 2 chronic kidney disease, with long-term current use of insulin ([facility] [facility] 01/01/1986           BJORN Black - CNP on 1/29/2021 at 3:36 PM

## 2021-02-08 ENCOUNTER — HOSPITAL ENCOUNTER (OUTPATIENT)
Age: 71
Setting detail: SPECIMEN
Discharge: HOME | End: 2021-02-08
Payer: COMMERCIAL

## 2021-02-08 ENCOUNTER — TELEPHONE (OUTPATIENT)
Dept: FAMILY MEDICINE CLINIC | Age: 71
End: 2021-02-08

## 2021-02-08 PROCEDURE — U0003 INFECTIOUS AGENT DETECTION BY NUCLEIC ACID (DNA OR RNA); SEVERE ACUTE RESPIRATORY SYNDROME CORONAVIRUS 2 (SARS-COV-2) (CORONAVIRUS DISEASE [COVID-19]), AMPLIFIED PROBE TECHNIQUE, MAKING USE OF HIGH THROUGHPUT TECHNOLOGIES AS DESCRIBED BY CMS-2020-01-R: HCPCS

## 2021-02-08 PROCEDURE — U0005 INFEC AGEN DETEC AMPLI PROBE: HCPCS

## 2021-02-08 NOTE — TELEPHONE ENCOUNTER
Pt has red spots on his chest area. They itch at times. The only thing that is changed is that he is using  Dr Martin Lips relax and release  Bath foam.  No sob, no red streaks.

## 2021-02-09 LAB
SARS-COV-2, RAPID: NORMAL
SARS-COV-2: NORMAL
SARS-COV-2: NOT DETECTED
SOURCE: NORMAL

## 2021-02-11 ENCOUNTER — ANESTHESIA EVENT (OUTPATIENT)
Dept: OPERATING ROOM | Age: 71
End: 2021-02-11
Payer: COMMERCIAL

## 2021-02-11 NOTE — TELEPHONE ENCOUNTER
Procedure time moved to 9:00am, 7am arrival time. Spoke with patient, he understands all time changes.

## 2021-02-12 ENCOUNTER — HOSPITAL ENCOUNTER (OUTPATIENT)
Dept: ULTRASOUND IMAGING | Age: 71
Setting detail: OUTPATIENT SURGERY
Discharge: HOME | End: 2021-02-14
Attending: UROLOGY
Payer: COMMERCIAL

## 2021-02-12 ENCOUNTER — ANESTHESIA (OUTPATIENT)
Dept: OPERATING ROOM | Age: 71
End: 2021-02-12
Payer: COMMERCIAL

## 2021-02-12 ENCOUNTER — HOSPITAL ENCOUNTER (OUTPATIENT)
Age: 71
Setting detail: OUTPATIENT SURGERY
Discharge: HOME | End: 2021-02-12
Attending: UROLOGY | Admitting: UROLOGY
Payer: COMMERCIAL

## 2021-02-12 VITALS
DIASTOLIC BLOOD PRESSURE: 82 MMHG | TEMPERATURE: 97.8 F | OXYGEN SATURATION: 100 % | WEIGHT: 177 LBS | SYSTOLIC BLOOD PRESSURE: 179 MMHG | HEART RATE: 77 BPM | RESPIRATION RATE: 16 BRPM | BODY MASS INDEX: 22.72 KG/M2 | HEIGHT: 74 IN

## 2021-02-12 VITALS — DIASTOLIC BLOOD PRESSURE: 83 MMHG | OXYGEN SATURATION: 100 % | SYSTOLIC BLOOD PRESSURE: 164 MMHG

## 2021-02-12 LAB
GLUCOSE BLD-MCNC: 146 MG/DL (ref 75–110)
GLUCOSE BLD-MCNC: 53 MG/DL (ref 75–110)

## 2021-02-12 PROCEDURE — 2709999900 HC NON-CHARGEABLE SUPPLY: Performed by: UROLOGY

## 2021-02-12 PROCEDURE — 7100000000 HC PACU RECOVERY - FIRST 15 MIN: Performed by: UROLOGY

## 2021-02-12 PROCEDURE — 2500000003 HC RX 250 WO HCPCS: Performed by: NURSE ANESTHETIST, CERTIFIED REGISTERED

## 2021-02-12 PROCEDURE — 2580000003 HC RX 258: Performed by: ANESTHESIOLOGY

## 2021-02-12 PROCEDURE — 3700000000 HC ANESTHESIA ATTENDED CARE: Performed by: UROLOGY

## 2021-02-12 PROCEDURE — 6360000002 HC RX W HCPCS: Performed by: NURSE ANESTHETIST, CERTIFIED REGISTERED

## 2021-02-12 PROCEDURE — 88305 TISSUE EXAM BY PATHOLOGIST: CPT

## 2021-02-12 PROCEDURE — 3600000002 HC SURGERY LEVEL 2 BASE: Performed by: UROLOGY

## 2021-02-12 PROCEDURE — 7100000011 HC PHASE II RECOVERY - ADDTL 15 MIN: Performed by: UROLOGY

## 2021-02-12 PROCEDURE — 3700000001 HC ADD 15 MINUTES (ANESTHESIA): Performed by: UROLOGY

## 2021-02-12 PROCEDURE — 55700 US BIOPSY PROSTATE NEEDLE/PUNCH: CPT

## 2021-02-12 PROCEDURE — 2500000003 HC RX 250 WO HCPCS: Performed by: UROLOGY

## 2021-02-12 PROCEDURE — 7100000031 HC ASPR PHASE II RECOVERY - ADDTL 15 MIN: Performed by: UROLOGY

## 2021-02-12 PROCEDURE — 7100000030 HC ASPR PHASE II RECOVERY - FIRST 15 MIN: Performed by: UROLOGY

## 2021-02-12 PROCEDURE — 82947 ASSAY GLUCOSE BLOOD QUANT: CPT

## 2021-02-12 PROCEDURE — 88344 IMHCHEM/IMCYTCHM EA MLT ANTB: CPT

## 2021-02-12 PROCEDURE — 3600000012 HC SURGERY LEVEL 2 ADDTL 15MIN: Performed by: UROLOGY

## 2021-02-12 PROCEDURE — 7100000001 HC PACU RECOVERY - ADDTL 15 MIN: Performed by: UROLOGY

## 2021-02-12 PROCEDURE — 7100000010 HC PHASE II RECOVERY - FIRST 15 MIN: Performed by: UROLOGY

## 2021-02-12 RX ORDER — PROPOFOL 10 MG/ML
INJECTION, EMULSION INTRAVENOUS PRN
Status: DISCONTINUED | OUTPATIENT
Start: 2021-02-12 | End: 2021-02-12 | Stop reason: SDUPTHER

## 2021-02-12 RX ORDER — MEPERIDINE HYDROCHLORIDE 25 MG/ML
12.5 INJECTION INTRAMUSCULAR; INTRAVENOUS; SUBCUTANEOUS EVERY 5 MIN PRN
Status: DISCONTINUED | OUTPATIENT
Start: 2021-02-12 | End: 2021-02-12 | Stop reason: HOSPADM

## 2021-02-12 RX ORDER — SODIUM CHLORIDE 0.9 % (FLUSH) 0.9 %
10 SYRINGE (ML) INJECTION PRN
Status: DISCONTINUED | OUTPATIENT
Start: 2021-02-12 | End: 2021-02-12 | Stop reason: HOSPADM

## 2021-02-12 RX ORDER — HYDRALAZINE HYDROCHLORIDE 20 MG/ML
5 INJECTION INTRAMUSCULAR; INTRAVENOUS EVERY 10 MIN PRN
Status: DISCONTINUED | OUTPATIENT
Start: 2021-02-12 | End: 2021-02-12 | Stop reason: HOSPADM

## 2021-02-12 RX ORDER — DEXTROSE MONOHYDRATE 25 G/50ML
25 INJECTION, SOLUTION INTRAVENOUS PRN
Status: DISCONTINUED | OUTPATIENT
Start: 2021-02-12 | End: 2021-02-12 | Stop reason: HOSPADM

## 2021-02-12 RX ORDER — HYDROCODONE BITARTRATE AND ACETAMINOPHEN 5; 325 MG/1; MG/1
1 TABLET ORAL
Status: DISCONTINUED | OUTPATIENT
Start: 2021-02-12 | End: 2021-02-12 | Stop reason: HOSPADM

## 2021-02-12 RX ORDER — LIDOCAINE HYDROCHLORIDE 10 MG/ML
INJECTION, SOLUTION EPIDURAL; INFILTRATION; INTRACAUDAL; PERINEURAL PRN
Status: DISCONTINUED | OUTPATIENT
Start: 2021-02-12 | End: 2021-02-12 | Stop reason: SDUPTHER

## 2021-02-12 RX ORDER — LIDOCAINE HYDROCHLORIDE 10 MG/ML
INJECTION, SOLUTION INFILTRATION; PERINEURAL PRN
Status: DISCONTINUED | OUTPATIENT
Start: 2021-02-12 | End: 2021-02-12 | Stop reason: ALTCHOICE

## 2021-02-12 RX ORDER — DIPHENHYDRAMINE HYDROCHLORIDE 50 MG/ML
12.5 INJECTION INTRAMUSCULAR; INTRAVENOUS
Status: DISCONTINUED | OUTPATIENT
Start: 2021-02-12 | End: 2021-02-12 | Stop reason: HOSPADM

## 2021-02-12 RX ORDER — SODIUM CHLORIDE 9 MG/ML
INJECTION, SOLUTION INTRAVENOUS CONTINUOUS
Status: DISCONTINUED | OUTPATIENT
Start: 2021-02-12 | End: 2021-02-12 | Stop reason: HOSPADM

## 2021-02-12 RX ORDER — PROMETHAZINE HYDROCHLORIDE 25 MG/ML
6.25 INJECTION, SOLUTION INTRAMUSCULAR; INTRAVENOUS
Status: DISCONTINUED | OUTPATIENT
Start: 2021-02-12 | End: 2021-02-12 | Stop reason: HOSPADM

## 2021-02-12 RX ADMIN — PROPOFOL 100 MG: 10 INJECTION, EMULSION INTRAVENOUS at 08:41

## 2021-02-12 RX ADMIN — SODIUM CHLORIDE: 9 INJECTION, SOLUTION INTRAVENOUS at 08:22

## 2021-02-12 RX ADMIN — DEXTROSE MONOHYDRATE 25 G: 25 INJECTION, SOLUTION INTRAVENOUS at 08:29

## 2021-02-12 RX ADMIN — LIDOCAINE HYDROCHLORIDE 50 MG: 10 INJECTION, SOLUTION EPIDURAL; INFILTRATION; INTRACAUDAL; PERINEURAL at 08:41

## 2021-02-12 ASSESSMENT — PULMONARY FUNCTION TESTS
PIF_VALUE: 1

## 2021-02-12 ASSESSMENT — PAIN SCALES - GENERAL
PAINLEVEL_OUTOF10: 0

## 2021-02-12 NOTE — BRIEF OP NOTE
Brief Postoperative Note      Patient: Lois Khan  YOB: 1950  MRN: [id number]    Date of Procedure: 2/12/2021    Pre-Op Diagnosis: ELEVATED PSA    Post-Op Diagnosis: Same       Procedure(s):  PROSTATE BIOPSY WITH ULTRASOUND TO OR WITH TECH    Surgeon(s):  Andrea Bartholomew MD    Assistant:  * No surgical staff found *    Anesthesia: Monitor Anesthesia Care    Estimated Blood Loss (mL): Minimal    Complications: None    Specimens:   ID Type Source Tests Collected by Time Destination   A : BASE LEFT MEDIAL Tissue Prostate SURGICAL PATHOLOGY Andrea Bartholomew MD 2/12/2021 0743    B : BASE LEFT LATERAL Tissue Prostate SURGICAL PATHOLOGY Andrea Bartholomew MD 2/12/2021 0826    C : MID LEFT MEDIAL Tissue Prostate SURGICAL PATHOLOGY Andrea Bartholomew MD 2/12/2021 8881    D : MID LEFT LATERAL Tissue Prostate SURGICAL PATHOLOGY Andrea Bartholomew MD 2/12/2021 3641    E : APEX LEFT MEDIAL Tissue Prostate SURGICAL PATHOLOGY Andrea Bartholomew MD 2/12/2021 3615    F : APEX LEFT LATERAL Tissue Prostate SURGICAL PATHOLOGY Andrea Bartholomew MD 2/12/2021 7845    G : BASE RIGHT MEDIAL Tissue Prostate SURGICAL PATHOLOGY Andrea Bartholomew MD 2/12/2021 8239    H : BASE RIGHT LATERAL Tissue Prostate SURGICAL PATHOLOGY Andrea Bartholomew MD 2/12/2021 2533    I : MID RIGHT MEDIAL Tissue Prostate SURGICAL PATHOLOGY Andrea Bartholomew MD 2/12/2021 0385    J : MID RIGHT LATERAL Tissue Prostate SURGICAL PATHOLOGY Andrea Bartholomew MD 2/12/2021 2944    K : APEX RIGHT MEDIAL Tissue Prostate SURGICAL PATHOLOGY Andrea Bartholomew MD 2/12/2021 0826    L : APEX RIGHT LATERAL Tissue Prostate SURGICAL PATHOLOGY nAdrea Bartholomew MD 2/12/2021 2966        Implants:  * No implants in log *      Drains: * No LDAs found *    Findings: 12 cores obtained, vol 38.      Electronically signed by Andrea Bartholomew MD on 2/12/2021 at 8:48 AM

## 2021-02-12 NOTE — ANESTHESIA POSTPROCEDURE EVALUATION
Department of Anesthesiology  Postprocedure Note    Patient: Paulette Flores  MRN: [id number]  YOB: 1950  Date of evaluation: 2/12/2021  Time:  10:26 AM     Procedure Summary     Date: 02/12/21 Room / Location: [address] [place] [address]     Anesthesia Start: 2882 Anesthesia Stop: 9161    Procedure: PROSTATE BIOPSY WITH ULTRASOUND TO OR WITH TECH (N/A ) Diagnosis: (ELEVATED PSA)    Surgeons: Tad Cano MD Responsible Provider: Alexandra Rinaldi MD    Anesthesia Type: MAC ASA Status: 3          Anesthesia Type: MAC    Renata Phase I: Renata Score: 10    Renata Phase II: Renata Score: 10    Last vitals: Reviewed and per EMR flowsheets.        Anesthesia Post Evaluation    Patient location during evaluation: bedside  Patient participation: complete - patient participated  Level of consciousness: awake and alert  Airway patency: patent  Nausea & Vomiting: no nausea and no vomiting  Complications: no  Cardiovascular status: hemodynamically stable  Respiratory status: acceptable  Hydration status: stable

## 2021-02-12 NOTE — ANESTHESIA PRE PROCEDURE
Department of Anesthesiology  Preprocedure Note       Name:  Carmine Johns   Age:  80 y.o.  :  1950                                          MRN:  [id number]         Date:  2021      Surgeon: Yoel Ortega):  Angelina Alex MD    Procedure: Procedure(s):  PROSTATE BIOPSY WITH ULTRASOUND TO OR WITH TECH    Medications prior to admission:   Prior to Admission medications    Medication Sig Start Date End Date Taking? Authorizing Provider   Vitamin E 180 MG capsule Take 180 mg by mouth daily    Historical Provider, MD   vitamin B-12 (CYANOCOBALAMIN) 1000 MCG tablet Take 1,000 mcg by mouth daily    Historical Provider, MD   Ascorbic Acid (VITAMIN C ADULT GUMMIES PO) Take 250 mg by mouth daily    Historical Provider, MD   Insulin Pen Needle (TECHLITE PEN NEEDLES) 32G X 6 MM MISC USE AS DIRECTED. WITH INSULIN PEN UP TO 5 TIMES PER DAY 20   Bijan Rodrigues DO   ACETAMINOPHEN EXTRA STRENGTH 500 MG tablet TAKE 2 TABLETS BY MOUTH THREE TIMES A DAY 20   Bijan Rodrigues DO   atorvastatin (LIPITOR) 40 MG tablet TAKE 1 TABLET BY MOUTH DAILY 20   Bijan Rodrigues DO   FREESTYLE LITE strip USE TO TEST TWICE A DAY AS DIRECTED 20   Bijan Rodrigues DO   tamsulosin (FLOMAX) 0.4 MG capsule TAKE 1 CAPSULE BY MOUTH DAILY 20   Bijan Rodrigues DO   gabapentin (NEURONTIN) 100 MG capsule TAKE 1 CAPSULE BY MOUTH 2 TIMES DAILY FOR 90 DAYS.  12/21/20 3/21/21  Bijan Rodrigues DO   famotidine (PEPCID) 20 MG tablet TAKE 1 TABLET BY MOUTH TWICE A DAY 20   Bijan Rodrigues DO   Blood Glucose Monitoring Suppl (ONE TOUCH ULTRA 2) w/Device KIT Check blood sugar four times daily 20   Bijan Rodrigues DO   NIFEdipine (PROCARDIA XL) 90 MG extended release tablet TAKE 1 TABLET BY MOUTH DAILY 20   Mandi Reynolds DO   Dulaglutide (TRULICITY) 1.5 LM/5.0PU SOPN Inject 1.5 mg into the skin once a week 20   Bijan Rodrigues DO   metoprolol succinate (TOPROL XL) 50 MG extended release tablet Take 1 tablet by mouth daily 9/23/20   Kristine Spurling, DO   Everolimus 0.75 MG TABS 2 times daily 3 tabs 2 times daily 6/19/20   Historical Provider, MD   Lancets MISC 1 each by Does not apply route daily 5/19/20   Zullinger Spurling, DO   Lancet Device MISC 1 Device by Does not apply route once for 1 dose 5/19/20 5/19/20  Kristine Spurling, DO   insulin glargine (LANTUS SOLOSTAR) 100 UNIT/ML injection pen Inject 18 Units into the skin 2 times daily 4/14/20 4/14/21  Zullinger Spurling, DO   Insulin Pen Needle (ULTICARE MINI PEN NEEDLES) 31G X 6 MM MISC PATIENT IS TESTING UP TO 5 TIMES DAILY 4/14/20   Zullinger Spurling, DO   magnesium oxide (MAG-OX) 400 (241.3 Mg) MG TABS tablet TAKE 1 TABLET BY MOUTH TWICE A DAY 4/14/20   Zullinger Spurling, DO   insulin lispro (HUMALOG KWIKPEN) 100 UNIT/ML pen Per Sliding scale. Max 28 units per day 9/16/19   Gee López, DO   tacrolimus (PROGRAF) 1 MG capsule Take 6 mg by mouth 2 times daily     Historical Provider, MD       Current medications:    Current Facility-Administered Medications   Medication Dose Route Frequency Provider Last Rate Last Admin    sodium chloride flush 0.9 % injection 10 mL  10 mL Intravenous PRN Manassa MD Char        0.9 % sodium chloride infusion   Intravenous Continuous Shubham Pittman MD           Allergies:     Allergies   Allergen Reactions    Amino Acids     Lisinopril        Problem List:    Patient Active Problem List   Diagnosis Code    Controlled type 2 diabetes mellitus with stage 2 chronic kidney disease, with long-term current use of insulin (HCC) E11.22, N18.2, Z79.4    Elevated serum cholesterol E78.9    Renal insufficiency syndrome N28.9    Chronic hepatitis C (HCC) B18.2    Coronary arteriosclerosis I25.10    End-stage renal disease (HCC) N18.6    Benign essential HTN I10    General patient noncompliance Z91.19    Encounter for colonoscopy due to history of adenomatous colonic polyps Z12.11, Z86.010    Family hx of colon cancer requiring screening colonoscopy Z80.0    History of renal transplant Z94.0    Hyperlipidemia E78.5    Tobacco user Z72.0    Chronic diarrhea K52.9    Immunosuppressed status (HCC) D84.9    Elevated C-reactive protein (CRP) R79.82    Elevated LDH R74.02    Normochromic normocytic anemia D64.9    Moderate protein-calorie malnutrition (HCC) E44.0    Abdominal pain R10.9    Acute kidney injury ([facility] [address] N17.9    Dizziness R42    Fever R50.9    Left ventricular hypertrophy I51.7    Lung mass R91.8    Pneumonia due to infectious organism J18.9    Elevated PSA, between 10 and less than 20 ng/ml R97.20       Past Medical History:        Diagnosis Date    Anemia     CAD (coronary artery disease)     CKD (chronic kidney disease) stage 4, GFR 15-29 ml/min (HCC)     Elevated PSA, between 10 and less than 20 ng/ml     End stage kidney disease (HCC)     Hemod[facility])     patient had Kidney transplant    Hepatitis C without hepatic coma     Hyperlipidemia     Hypertension     Irregular heart rate     wearing holter monitor 21    Leukopenia 2021    Palpitations     [facility] recipient 2017    [facility]    [facility] 2021    Type 2 diabetes mellitus ([facility]        Past Surgical History:        Procedure Laterality Date    CARDIAC CATHETERIZATION      Patient states normal    COLONOSCOPY N/A 10/30/2019    COLONOSCOPY WITH BIOPSY performed by Rahel June MD at [address] [place] [address] Right 2015    at [facility]    left inguinal    KIDNEY TRANSPLANT  2017    right       Social History:    Social History     Tobacco Use    Smoking status: Former Smoker     Packs/day: 0.50     Years: 50.00     Pack years: 25.00     Types: Cigarettes     Quit date: 2013     Years since quittin.8    Smokeless tobacco: Never Used    Tobacco comment: No smoking in at least 3 years   Substance Use Topics    Alcohol use:  No Comment: No ETOH in 3 years                                Counseling given: Not Answered  Comment: No smoking in at least 3 years      Vital Signs (Current): There were no vitals filed for this visit. BP Readings from Last 3 Encounters:   01/29/21 (!) 141/81   01/28/21 120/70   11/19/20 130/86       NPO Status:                                                                                 BMI:   Wt Readings from Last 3 Encounters:   01/29/21 177 lb (80.3 kg)   01/28/21 176 lb 9.6 oz (80.1 kg)   11/19/20 174 lb (78.9 kg)     There is no height or weight on file to calculate BMI.    CBC:   Lab Results   Component Value Date    WBC 2.5 01/29/2021    RBC 3.78 01/29/2021    RBC 3.71 12/27/2018    HGB 11.3 01/29/2021    HCT 33.8 01/29/2021    MCV 89.6 01/29/2021    RDW 14.3 01/29/2021     01/29/2021       CMP:   Lab Results   Component Value Date     01/29/2021    K 5.1 01/29/2021     01/29/2021    CO2 22 01/29/2021    BUN 41 01/29/2021    CREATININE 2.68 01/29/2021    GFRAA 29 01/29/2021    LABGLOM 24 01/29/2021    GLUCOSE 118 01/29/2021    GLUCOSE 207 12/27/2018    PROT 7.2 03/24/2020    PROT 6.4 12/27/2018    CALCIUM 9.0 01/29/2021    BILITOT 0.46 03/24/2020    ALKPHOS 111 03/24/2020    AST 30 08/14/2020    ALT 35 08/14/2020       POC Tests: No results for input(s): POCGLU, POCNA, POCK, POCCL, POCBUN, POCHEMO, POCHCT in the last 72 hours.     Coags:   Lab Results   Component Value Date    PROTIME 10.7 03/24/2020    PROTIME 14.1 09/12/2017    INR 1.0 03/24/2020    APTT 32.6 09/12/2017       HCG (If Applicable): No results found for: PREGTESTUR, PREGSERUM, HCG, HCGQUANT     ABGs: No results found for: PHART, PO2ART, ARN3RXQ, YHY5DWF, BEART, V3KXIWJD     Type & Screen (If Applicable):  No results found for: LABABO, LABRH    Drug/Infectious Status (If Applicable):  Lab Results   Component Value Date    HEPCAB POSITIVE 05/02/2017       COVID-19 Screening (If Applicable):   Lab Results   Component Value Date    COVID19 Not Detected 02/08/2021    COVID19 DETECTED 03/23/2020         Anesthesia Evaluation  Patient summary reviewed and Nursing notes reviewed no history of anesthetic complications:   Airway: Mallampati: I  TM distance: >3 FB   Neck ROM: full  Mouth opening: > = 3 FB Dental:      Comment: Missing teeth    Pulmonary:Negative Pulmonary ROS and normal exam                               Cardiovascular:  Exercise tolerance: poor (<4 METS),   (+) hypertension:, CAD:, dysrhythmias:,                   Neuro/Psych:   Negative Neuro/Psych ROS              GI/Hepatic/Renal:   (+) hepatitis: C, renal disease: ESRD,          ROS comment: Renal transplant. Endo/Other:    (+) DiabetesType II DM, using insulin, . Abdominal:           Vascular:                                      Anesthesia Plan      MAC     ASA 3       Induction: intravenous. MIPS: Postoperative opioids intended and Prophylactic antiemetics administered. Anesthetic plan and risks discussed with patient. Plan discussed with CRNA.                   Demetrice Fu MD   2/12/2021

## 2021-02-12 NOTE — INTERVAL H&P NOTE
Update History & Physical     The patient's History and Physical of 1-29-21 was reviewed with the patient. I personally examined the patient, I concur with above findings, there was no change EXCEPT he did receive his first COVID-19 vaccine on 2-10-21, notes some left upper arm soreness, no other abnormal s/s. Holter monitor has been completed- patient does not have results. He did not check his blood sugar yet this morning- last took insulin last night, he denies hypoglycemic s/s. Physical assessment unchanged except b/l TM's not examined today. NPO status: Patient states they have been NPO since before midnight. Medications last taken: PROCARDIA XL, TOPROL XL, PROGRAF, KEFLEX, EVEROLIMUS, TAMSULOSIN TAKEN THIS MORNING. Anticoagulation status: Patient denies taking any anti-coagulants, including aspirin currently. Personal or family hx of complications w/anesthesia: Denies. Denies CP, palpitations, dizziness, SOB, URI s/s. Review current vital signs per RN flow sheet.   (Notation: Medications listed are not currently reconciled at the signing of this H&P note, to be reconciled in pre-op per RN)    Patient does have CKD, hx of renal transplant as noted per source note, see elevated creatinine/decreased GFR as noted below (last creatinine on file to compare was 8-14-20 was 2.28):    Most recent lab work reviewed:  Lab Results   Component Value Date     01/29/2021    K 5.1 01/29/2021     (H) 01/29/2021    CO2 22 01/29/2021    BUN 41 (H) 01/29/2021    CREATININE 2.68 (H) 01/29/2021    GLUCOSE 118 (H) 01/29/2021    CALCIUM 9.0 01/29/2021    PROT 7.2 03/24/2020    LABALBU 4.8 08/14/2020    BILITOT 0.46 03/24/2020    ALKPHOS 111 03/24/2020    AST 30 08/14/2020    ALT 35 08/14/2020    LABGLOM 24 (L) 01/29/2021    GFRAA 29 (L) 01/29/2021       Lab Results   Component Value Date    WBC 2.5 (L) 01/29/2021    HGB 11.3 (L) 01/29/2021    HCT 33.8 (L) 01/29/2021    MCV 89.6 01/29/2021     (L) 01/29/2021       Surgical site was confirmed per myself and the patient.   Electronically signed by BJORN Young CNP on 2/12/2021 at 7:13 AM

## 2021-02-13 NOTE — OP NOTE
[address] [facility]                 [address] [facility], [address]                                OPERATIVE REPORT    PATIENT NAME: Jose Kumar                    :        1950  MED REC NO:   [id number]                              ROOM:  ACCOUNT NO:   [de-identified]                           ADMIT DATE: 2021  PROVIDER:     Ross Rivera    DATE OF PROCEDURE:  2021    PREOPERATIVE DIAGNOSIS:  Elevated PSA. POSTOPERATIVE DIAGNOSIS:  Elevated PSA. OPERATION PERFORMED:  Transrectal ultrasound-guided biopsy of the  prostate. SURGEON:  Ross Rivera MD    ANESTHESIA:  MAC.    COMPLICATIONS:  None. BLEEDING:  Minimal.    SPECIMEN:  Prostate biopsy. HISTORY OF PRESENT ILLNESS:  The patient is a 73-year-old male, who was  found to have an elevated PSA of 12. He is here today for prostate  biopsy. PROCEDURE IN DETAIL:  The patient was brought back to the operating room  and laid on the operating table in the supine position. Once MAC  anesthesia was obtained, a time-out was performed. He was properly  identified. He has been on preoperative Cipro. The ultrasound probe  was then inserted into the rectum. A volumetric study of the prostate  was performed and was found to be 38 mL. A periprostatic nerve block  was achieved by injecting 1% lidocaine at the base of the prostate and  the junction of the seminal vesicles. At that point, 12 biopsies were  obtained, six per side, two at the base, two at the mid gland, and two  at the apex. Once all biopsies were completed, he awoke from anesthesia  without any complications and was taken back to postoperative anesthesia  care in good condition. He will be discharged home today and will  follow up to go over the results of the prostate biopsy. He is to  finish his Cipro, take Tylenol as needed. Postoperative instructions  were given.         Ginny Nicholas    D: 2021 8:49:35       T: 02/12/2021 10:30:10     [email or web address]  Job#: [id number]     Doc#: [id number]    CC:  Latricia Machado

## 2021-02-16 LAB — SURGICAL PATHOLOGY REPORT: NORMAL

## 2021-02-18 RX ORDER — INSULIN GLARGINE 100 [IU]/ML
18 INJECTION, SOLUTION SUBCUTANEOUS NIGHTLY
Qty: 5 PEN | Refills: 3 | Status: SHIPPED | OUTPATIENT
Start: 2021-02-18 | End: 2021-06-22

## 2021-02-18 NOTE — TELEPHONE ENCOUNTER
Rimma Anderson is calling to request a refill on the following medication(s):    Medication Request:  Requested Prescriptions     Pending Prescriptions Disp Refills    insulin glargine (BASAGLAR KWIKPEN) 100 UNIT/ML injection pen 5 pen 3     Sig: Inject 18 Units into the skin nightly       Last Visit Date (If Applicable):  9/38/6904    Next Visit Date:    2/23/2021

## 2021-02-23 ENCOUNTER — OFFICE VISIT (OUTPATIENT)
Dept: UROLOGY | Age: 71
End: 2021-02-23
Payer: COMMERCIAL

## 2021-02-23 ENCOUNTER — OFFICE VISIT (OUTPATIENT)
Dept: FAMILY MEDICINE CLINIC | Age: 71
End: 2021-02-23
Payer: COMMERCIAL

## 2021-02-23 VITALS — TEMPERATURE: 97.9 F | SYSTOLIC BLOOD PRESSURE: 169 MMHG | HEART RATE: 79 BPM | DIASTOLIC BLOOD PRESSURE: 99 MMHG

## 2021-02-23 VITALS
SYSTOLIC BLOOD PRESSURE: 120 MMHG | TEMPERATURE: 97.2 F | DIASTOLIC BLOOD PRESSURE: 80 MMHG | OXYGEN SATURATION: 98 % | BODY MASS INDEX: 22.77 KG/M2 | HEIGHT: 74 IN | WEIGHT: 177.4 LBS | HEART RATE: 74 BPM

## 2021-02-23 DIAGNOSIS — E78.5 DYSLIPIDEMIA: ICD-10-CM

## 2021-02-23 DIAGNOSIS — E11.65 TYPE 2 DIABETES MELLITUS WITH HYPERGLYCEMIA, WITH LONG-TERM CURRENT USE OF INSULIN (HCC): Primary | ICD-10-CM

## 2021-02-23 DIAGNOSIS — I10 HTN, GOAL BELOW 130/80: ICD-10-CM

## 2021-02-23 DIAGNOSIS — C61 PROSTATE CANCER (HCC): ICD-10-CM

## 2021-02-23 DIAGNOSIS — B35.9 TINEA: ICD-10-CM

## 2021-02-23 DIAGNOSIS — Z79.4 TYPE 2 DIABETES MELLITUS WITH HYPERGLYCEMIA, WITH LONG-TERM CURRENT USE OF INSULIN (HCC): Primary | ICD-10-CM

## 2021-02-23 DIAGNOSIS — R97.20 ELEVATED PSA: Primary | ICD-10-CM

## 2021-02-23 PROCEDURE — 3017F COLORECTAL CA SCREEN DOC REV: CPT | Performed by: FAMILY MEDICINE

## 2021-02-23 PROCEDURE — 1036F TOBACCO NON-USER: CPT | Performed by: UROLOGY

## 2021-02-23 PROCEDURE — 4040F PNEUMOC VAC/ADMIN/RCVD: CPT | Performed by: FAMILY MEDICINE

## 2021-02-23 PROCEDURE — 2022F DILAT RTA XM EVC RTNOPTHY: CPT | Performed by: FAMILY MEDICINE

## 2021-02-23 PROCEDURE — G8420 CALC BMI NORM PARAMETERS: HCPCS | Performed by: FAMILY MEDICINE

## 2021-02-23 PROCEDURE — G8427 DOCREV CUR MEDS BY ELIG CLIN: HCPCS | Performed by: FAMILY MEDICINE

## 2021-02-23 PROCEDURE — 99214 OFFICE O/P EST MOD 30 MIN: CPT | Performed by: UROLOGY

## 2021-02-23 PROCEDURE — 1123F ACP DISCUSS/DSCN MKR DOCD: CPT | Performed by: UROLOGY

## 2021-02-23 PROCEDURE — 3046F HEMOGLOBIN A1C LEVEL >9.0%: CPT | Performed by: FAMILY MEDICINE

## 2021-02-23 PROCEDURE — 3017F COLORECTAL CA SCREEN DOC REV: CPT | Performed by: UROLOGY

## 2021-02-23 PROCEDURE — G8484 FLU IMMUNIZE NO ADMIN: HCPCS | Performed by: UROLOGY

## 2021-02-23 PROCEDURE — G8420 CALC BMI NORM PARAMETERS: HCPCS | Performed by: UROLOGY

## 2021-02-23 PROCEDURE — 4040F PNEUMOC VAC/ADMIN/RCVD: CPT | Performed by: UROLOGY

## 2021-02-23 PROCEDURE — 1123F ACP DISCUSS/DSCN MKR DOCD: CPT | Performed by: FAMILY MEDICINE

## 2021-02-23 PROCEDURE — G8427 DOCREV CUR MEDS BY ELIG CLIN: HCPCS | Performed by: UROLOGY

## 2021-02-23 PROCEDURE — 1036F TOBACCO NON-USER: CPT | Performed by: FAMILY MEDICINE

## 2021-02-23 PROCEDURE — 99214 OFFICE O/P EST MOD 30 MIN: CPT | Performed by: FAMILY MEDICINE

## 2021-02-23 PROCEDURE — G8484 FLU IMMUNIZE NO ADMIN: HCPCS | Performed by: FAMILY MEDICINE

## 2021-02-23 RX ORDER — CLOTRIMAZOLE AND BETAMETHASONE DIPROPIONATE 10; .64 MG/G; MG/G
CREAM TOPICAL
Qty: 45 G | Refills: 0 | Status: SHIPPED | OUTPATIENT
Start: 2021-02-23 | End: 2021-03-09

## 2021-02-23 ASSESSMENT — ENCOUNTER SYMPTOMS
ABDOMINAL PAIN: 0
EYE REDNESS: 0
NAUSEA: 0
COLOR CHANGE: 0
SHORTNESS OF BREATH: 0
COUGH: 0
EYE PAIN: 0
BACK PAIN: 0
WHEEZING: 0
VOMITING: 0

## 2021-02-23 ASSESSMENT — PATIENT HEALTH QUESTIONNAIRE - PHQ9
SUM OF ALL RESPONSES TO PHQ QUESTIONS 1-9: 0
SUM OF ALL RESPONSES TO PHQ QUESTIONS 1-9: 0
1. LITTLE INTEREST OR PLEASURE IN DOING THINGS: 0
2. FEELING DOWN, DEPRESSED OR HOPELESS: 0

## 2021-02-23 NOTE — PROGRESS NOTES
Progress Note    Krystal English is a 80 y.o.  male who presents today alone for evaluation of   Chief Complaint   Patient presents with    3 Month Follow-Up    Rash     chest    Diabetes    Hypertension    Hyperlipidemia           HPI:   Patient is here for HTN, DM type 2, and dyslipidemia follow up. Patient denies cp/sob/le edema/dizziness/lightheadedness/blurry va/ha. Patient states he is taking all of his medications as directed. Patient states he is seeing his nephrologist regularly. Patient last HbA1c 8/2020 7.9. Patient is using trulicity as directed. Patient states he is using his basaglar 18 units twice daily. Patient takes 12 units of Humalog three times daily. Patient states he is attempting to monitor his carb intake. Patient denies exertional calf cramping. Patient denies polyuria/polyphagia/polydipsia. Patient reports Navya Alyson in the last year. He states his FBG was 120-140. PHQ-9 Total Score: 0 (2/23/2021  3:26 PM)    Patient states he has had a rash on his chest for the past few months. Patient states it is itchy. Patient denies bite or trauma. Patient denies blistering or vesicles. Patient denies needing refills. Patient is following with urology for his newly dx'd prostate cancer. Health Maintenance Due   Topic Date Due    Hepatitis A vaccine (1 of 2 - Risk 2-dose series) 01/04/1951    Diabetic retinal exam  08/16/2017    Diabetic foot exam  11/22/2020        Current Medications:     Current Outpatient Medications   Medication Sig Dispense Refill    clotrimazole-betamethasone (LOTRISONE) 1-0.05 % cream Apply topically 2 times daily. 45 g 0    insulin glargine (BASAGLAR KWIKPEN) 100 UNIT/ML injection pen Inject 18 Units into the skin nightly 5 pen 3    Vitamin E 180 MG capsule Take 180 mg by mouth daily      vitamin B-12 (CYANOCOBALAMIN) 1000 MCG tablet Take 1,000 mcg by mouth daily      Insulin Pen Needle (TECHLITE PEN NEEDLES) 32G X 6 MM MISC USE AS DIRECTED. WITH INSULIN PEN UP TO 5 TIMES PER  each 3    atorvastatin (LIPITOR) 40 MG tablet TAKE 1 TABLET BY MOUTH DAILY 90 tablet 0    FREESTYLE LITE strip USE TO TEST TWICE A DAY AS DIRECTED 50 each 5    tamsulosin (FLOMAX) 0.4 MG capsule TAKE 1 CAPSULE BY MOUTH DAILY 90 capsule 0    gabapentin (NEURONTIN) 100 MG capsule TAKE 1 CAPSULE BY MOUTH 2 TIMES DAILY FOR 90 DAYS. 180 capsule 0    famotidine (PEPCID) 20 MG tablet TAKE 1 TABLET BY MOUTH TWICE A DAY 60 tablet 3    Blood Glucose Monitoring Suppl (ONE TOUCH ULTRA 2) w/Device KIT Check blood sugar four times daily 1 kit 0    NIFEdipine (PROCARDIA XL) 90 MG extended release tablet TAKE 1 TABLET BY MOUTH DAILY 90 tablet 1    Dulaglutide (TRULICITY) 1.5 LR/8.3DD SOPN Inject 1.5 mg into the skin once a week 4 pen 0    metoprolol succinate (TOPROL XL) 50 MG extended release tablet Take 1 tablet by mouth daily 90 tablet 1    Everolimus 0.75 MG TABS 2 times daily 3 tabs 2 times daily      Lancets MISC 1 each by Does not apply route daily 100 each 5    Insulin Pen Needle (ULTICARE MINI PEN NEEDLES) 31G X 6 MM MISC PATIENT IS TESTING UP TO 5 TIMES DAILY 100 each 3    magnesium oxide (MAG-OX) 400 (241.3 Mg) MG TABS tablet TAKE 1 TABLET BY MOUTH TWICE A DAY 60 tablet 2    insulin lispro (HUMALOG KWIKPEN) 100 UNIT/ML pen Per Sliding scale. Max 28 units per day 3 pen 5    tacrolimus (PROGRAF) 1 MG capsule Take 6 mg by mouth 2 times daily       Ascorbic Acid (VITAMIN C ADULT GUMMIES PO) Take 250 mg by mouth daily      Lancet Device MISC 1 Device by Does not apply route once for 1 dose 100 Device 0     No current facility-administered medications for this visit. Allergies:      Allergies   Allergen Reactions    Amino Acids     Lisinopril         Medical History:     Past Medical History:   Diagnosis Date    Anemia     CAD (coronary artery disease)     CKD (chronic kidney disease) stage 4, GFR 15-29 ml/min ([facility])     Elevated PSA, between 10 and less than 20 ng/ml     End stage kidney disease ([facility] [address]     [facility]     patient had Kidney transplant    Hepatitis C without hepatic coma     Hyperlipidemia     Hypertension     Irregular heart rate     wearing holter monitor 21    Leukopenia 2021    Palpitations     [facility] 2017    [facility]    Thrombocytopenia ([facility] 2021    Type [facility] diabetes [facility]        Past Surgical History:   Procedure Laterality Date    CARDIAC CATHETERIZATION      Patient states normal    COLONOSCOPY N/A 10/30/2019    [facility]    at [facility]    left inguinal    KIDNEY TRANSPLANT  2017    right    PROSTATE BIOPSY N/A 2021    PROSTATE BIOPSY WITH ULTRASOUND TO OR WITH TECH performed by Cassidy Dietrich MD at [facility]  2021    US PROSTATE NEEDLE PUNCH 2021 [facility] ULTRASOUND       Family History   Problem Relation Age of Onset   [facility] Cancer Mother         Lymphoma    Lung Cancer Father     Colon Cancer Brother         Social History:     Social History     Socioeconomic History    Marital status:      Spouse name: Not on file    Number of children: Not on file    Years of education: Not on file    Highest education level: Not on file   Occupational History    Not on file   Social Needs    Financial resource strain: Somewhat hard    Food insecurity     Worry: Often true     Inability: Often true    Transportation needs     Medical: Yes     Non-medical: Yes   Tobacco Use    Smoking status: Former Smoker     Packs/day: 0.50     Years: 50.00     Pack years: 25.00     Types: Cigarettes     Quit date: 2013     Years since quittin.9    Smokeless tobacco: Never Used    Tobacco comment: No smoking in at least 3 years   Substance and Sexual Activity    Alcohol use: No     Comment: No ETOH in 3 years    Drug use: Not Currently     Types: Other-see comments     Comment: Hx of marijuana and crack cocaine previously - last use was about 3 years ago as of 1-29-21    Sexual activity: Never   Lifestyle    Physical activity     Days per week: Not on file     Minutes per session: Not on file    Stress: Not on file   Relationships    Social connections     Talks on phone: Not on file     Gets together: Not on file     Attends [employer] service: Not on file     Active member of club or organization: Not on file     Attends meetings of clubs or organizations: Not on file     Relationship status: Not on file    Intimate partner violence     Fear of current or ex partner: Not on file     Emotionally abused: Not on file     Physically abused: Not on file     Forced sexual activity: Not on file   Other Topics Concern    Not on file   Social History Narrative    Not on file        ROS:     Constitutional: No fevers, chills, fatigue. ENT: No nasal congestion or sore throat  Respiratory: No difficulty in breathing or cough. Cardiovascular: No chest pain, no palpitations, no shortness of breath  Gastrointestinal: No abdominal pain or change in bowel movements. Genitourinary: No change in urinary frequency or dysuria. Skin: +rash over chest; no lesions  Neurological: No weakness. No headaches. Last Filed Vitals:  /80   Pulse 74   Temp 97.2 °F (36.2 °C) (Temporal)   Ht 6' 2\" (1.88 m)   Wt 177 lb 6.4 oz (80.5 kg)   SpO2 98%   BMI 22.78 kg/m²      Physical Examination:     GENERAL APPEARANCE: in no acute distress, well developed, well nourished. HEAD: normocephalic, atraumatic. EYES: extraocular movement intact (EOMI), pupils equal, round, reactive to light and accommodation. EARS: normal, tympanic membrane intact, clear, auditory canal clear. NOSE: nares patent, no erythema, sinuses nontender bilaterally, no rhinorrhea. ORAL CAVITY: mucosa moist, no lesions.    THROAT: clear, no mass, no exudate. NECK/THYROID: neck supple, full range of motion, no thyromegaly. HEART: no murmurs, regular rate and rhythm, S1, S2 normal.   LUNGS: clear to auscultation bilaterally, no wheezes, rales, rhonchi. ABDOMEN: normal, bowel sounds present, soft, nontender, nondistended, no rebound guarding or rigidity  SKIN: +erythematous patch over chest with central clearing and scale. Recent Labs/ In Office Testing/ Radiograph review:         No results found for this visit on 02/23/21. Assessment/Plan:     Yoli Aguilar was seen today for 3 month follow-up, rash, diabetes, hypertension and hyperlipidemia. Diagnoses and all orders for this visit:    Type 2 diabetes mellitus with hyperglycemia, with long-term current use of insulin (HCC)  -     Hemoglobin A1C; Future  -     Microalbumin, Ur; Future    HTN, goal below 130/80  -     Magnesium; Future  -     Renal Function Panel; Future    Dyslipidemia  -     ALT; Future  -     AST; Future  -     CBC Auto Differential; Future  -     Lipid Panel; Future  -     TSH with Reflex; Future    BMI 22.0-22.9, adult    Tinea  -     clotrimazole-betamethasone (LOTRISONE) 1-0.05 % cream; Apply topically 2 times daily. Follow up on labs. Encouraged well balanced low carb diet. Encouraged 150 mins of aerobic activity weekly. Encouraged regular follow up with his specialists. Rx as above for tinea rash over chest.    All questions answered and addressed to patient satisfaction. Patient understands and agrees to the plan. The patient was evaluated and treated today based on the osteopathic principle that each person is a unit of body, mind, and spirit, the body is capable of self-regulation, self-healing, and health maintenance and that structure and function are reciprocally interrelated. Follow-up:   Return in about 3 months (around 5/23/2021) for dm/htn/chol; 20 min appt.       Karina Ku D.O.

## 2021-02-23 NOTE — PROGRESS NOTES
Review of Systems   Constitutional: Negative for appetite change, chills and fever. Eyes: Negative for pain, redness and visual disturbance. Respiratory: Negative for cough, shortness of breath and wheezing. Cardiovascular: Negative for chest pain and leg swelling. Gastrointestinal: Negative for abdominal pain, nausea and vomiting. Genitourinary: Positive for frequency (at night x10 ). Negative for difficulty urinating, discharge, dysuria, flank pain, hematuria, scrotal swelling, testicular pain and urgency. Musculoskeletal: Negative for back pain, joint swelling and myalgias. Skin: Negative for color change, rash and wound. Neurological: Negative for dizziness, tremors and numbness. Hematological: Negative for adenopathy. Does not bruise/bleed easily.

## 2021-02-23 NOTE — LETTER
[address] [address]  Dept: [phone]  Dept Fax: [phone]        2/23/21    Patient: Melly Chong  YOB: 1950    Dear Leatha Navarro DO,    I had the pleasure of seeing one of your patients, Juan Gutiérrez today in the office today. Below are the relevant portions of my assessment and plan of care. IMPRESSION:  1. Elevated PSA    2. Prostate cancer ([facility] [address]        PLAN:  Had elevated PSA of 12. Biopsy shows prostate cancer, worst being Preston 8. Will obtain bone scan and CT scan. Will need treatment after met eval is done. Return in about 3 weeks (around 3/16/2021). Prescriptions Ordered:  No orders of the defined types were placed in this encounter. Orders Placed:  Orders Placed This Encounter   Procedures    NM BONE SCAN WHOLE BODY     Standing Status:   Future     Standing Expiration Date:   2/24/2022    CT PELVIS WO CONTRAST Additional Contrast? None     Standing Status:   Future     Standing Expiration Date:   2/23/2022     Order Specific Question:   Additional Contrast?     Answer:   None        Thank you for allowing me to participate in the care of this patient. I will keep you updated on this patient's follow up and I look forward to serving you and your patients again in the future.         Elif Witt MD

## 2021-02-23 NOTE — PROGRESS NOTES
[address] [address]  Dept: [address] [facility] Urology Office Note - Established    Patient:  Pk Cyr  YOB: 1950  Date: 2/23/2021    The patient is a 80 y.o. male who presents todayfor evaluation of the following problems:   Chief Complaint   Patient presents with    Elevated PSA     bx results        HPI  He is here after biopsy. He did well. No bleeding, no fevers, no pain. His path did show some prostate cancer. His PSA had gone from 3 to 12. He had Zoraida 6, 7, 8 on biopsy. Summary of old records: N/A    Additional History: N/A    Procedures Today: N/A    Urinalysis today:  No results found for this visit on 02/23/21. Last several PSA's:  Lab Results   Component Value Date    PSA 12.22 (H) 08/14/2020    PSA 3.2 02/16/2018    PSA 2.4 05/02/2017     Last total testosterone:  No results found for: TESTOSTERONE    AUA Symptom Score (2/23/2021):   INCOMPLETE EMPTYING: How often have you had the sensation of not emptying your bladder?: Not at all  FREQUENCY: How often do you have to urinate less than every two hours?: Not at all  INTERMITTENCY: How often have you found you stopped and started again several times when you urinated?: Not at all  URGENCY: How often have you found it difficult to postpone urination?: Not at all  WEAK STREAM: How often have you had a weak urinary stream?: About Half the time  STRAINING: How often have you had to strain to start  urination?: Not at all  NOCTURIA: How many times did you typically get up at night to uriniate?: 5 Times(pt states 10 times per night )  TOTAL I-PSS SCORE[de-identified] 8  How would you feel if you were to spend the rest of your life with your urinary condition?: Unhappy    Last BUN and creatinine:  Lab Results   Component Value Date    BUN 41 (H) 01/29/2021     Lab Results   Component Value Date    CREATININE 2.68 (H) 01/29/2021       Additional Lab/Culture results: path report reviewed.    Imaging Reviewed during this Office Visit: none  (results were independently reviewed by physician and radiology report verified)    PAST MEDICAL, FAMILY AND SOCIAL HISTORY UPDATE:  Past Medical History:   Diagnosis Date    Anemia     CAD (coronary artery disease)     CKD (chronic kidney disease) stage 4, GFR 15-29 ml/min (HCC)     Elevated PSA, between 10 and less than 20 ng/ml     [facility] stage kidney disease ([facility] [address]     Hemod[facility])     patient had Kidney transplant    Hepatitis C without hepatic coma     Hyperlipidemia     Hypertension     Irregular heart rate     wearing holter monitor 1/29/21    Leukopenia 01/29/2021    Palpitations     [facility]    Thrombocytopenia ([facility] [address] 01/29/2021    Type 2 diabetes mellitus ([facility] [address]      Past Surgical History:   Procedure Laterality Date    CARDIAC CATHETERIZATION      Patient states normal    COLONOSCOPY N/A 10/30/2019    COLONOSCOPY WITH BIOPSY performed by Dylon Jessica MD at [address] [place] [address] Right 11/2015    at [facility]    left inguinal    KIDNEY TRANSPLANT  2017    right    PROSTATE BIOPSY N/A 2/12/2021    PROSTATE BIOPSY WITH ULTRASOUND TO OR WITH TECH performed by Jens Joya MD at [facility]  2/12/2021    US PROSTATE NEEDLE PUNCH 2/12/2021 [facility] ULTRASOUND     Family History   Problem Relation Age of Onset    Cancer Mother         Lymphoma    Lung Cancer Father     Colon Cancer Brother      Outpatient Medications Marked as Taking for the 2/23/21 encounter (Office Visit) with Jens Joya MD   Medication Sig Dispense Refill    insulin glargine (BASAGLAR KWIKPEN) 100 UNIT/ML injection pen Inject 18 Units into the skin nightly 5 pen 3    Vitamin E 180 MG capsule Take 180 mg by mouth daily      vitamin B-12 (CYANOCOBALAMIN) 1000 MCG tablet Take 1,000 mcg by mouth daily  Ascorbic Acid (VITAMIN C ADULT GUMMIES PO) Take 250 mg by mouth daily      Insulin Pen Needle (TECHLITE PEN NEEDLES) 32G X 6 MM MISC USE AS DIRECTED. WITH INSULIN PEN UP TO 5 TIMES PER  each 3    ACETAMINOPHEN EXTRA STRENGTH 500 MG tablet TAKE 2 TABLETS BY MOUTH THREE TIMES A  tablet 1    atorvastatin (LIPITOR) 40 MG tablet TAKE 1 TABLET BY MOUTH DAILY 90 tablet 0    FREESTYLE LITE strip USE TO TEST TWICE A DAY AS DIRECTED 50 each 5    tamsulosin (FLOMAX) 0.4 MG capsule TAKE 1 CAPSULE BY MOUTH DAILY 90 capsule 0    gabapentin (NEURONTIN) 100 MG capsule TAKE 1 CAPSULE BY MOUTH 2 TIMES DAILY FOR 90 DAYS. 180 capsule 0    famotidine (PEPCID) 20 MG tablet TAKE 1 TABLET BY MOUTH TWICE A DAY 60 tablet 3    Blood Glucose Monitoring Suppl (ONE TOUCH ULTRA 2) w/Device KIT Check blood sugar four times daily 1 kit 0    NIFEdipine (PROCARDIA XL) 90 MG extended release tablet TAKE 1 TABLET BY MOUTH DAILY 90 tablet 1    Dulaglutide (TRULICITY) 1.5 YH/1.8XM SOPN Inject 1.5 mg into the skin once a week 4 pen 0    metoprolol succinate (TOPROL XL) 50 MG extended release tablet Take 1 tablet by mouth daily 90 tablet 1    Everolimus 0.75 MG TABS 2 times daily 3 tabs 2 times daily      Lancets MISC 1 each by Does not apply route daily 100 each 5    insulin glargine (LANTUS SOLOSTAR) 100 UNIT/ML injection pen Inject 18 Units into the skin 2 times daily 4 pen 5    Insulin Pen Needle (ULTICARE MINI PEN NEEDLES) 31G X 6 MM MISC PATIENT IS TESTING UP TO 5 TIMES DAILY 100 each 3    magnesium oxide (MAG-OX) 400 (241.3 Mg) MG TABS tablet TAKE 1 TABLET BY MOUTH TWICE A DAY 60 tablet 2    insulin lispro (HUMALOG KWIKPEN) 100 UNIT/ML pen Per Sliding scale.  Max 28 units per day 3 pen 5    tacrolimus (PROGRAF) 1 MG capsule Take 6 mg by mouth 2 times daily          Amino acids and Lisinopril  Social History     Tobacco Use   Smoking Status Former Smoker    Packs/day: 0.50    Years: 50.00    Pack years: 25.00    Types: Cigarettes    Quit date: 2013    Years since quittin.9   Smokeless Tobacco Never Used   Tobacco Comment    No smoking in at least 3 years     (Ifpatient a smoker, smoking cessation counseling offered)    Social History     Substance and Sexual Activity   Alcohol Use No    Comment: No ETOH in 3 years       REVIEW OF SYSTEMS:  Review of Systems    Physical Exam:      Vitals:    21 1152   BP: (!) 169/99   Pulse: 79   Temp: 97.9 °F (36.6 °C)     There is no height or weight on file to calculate BMI. Patient is a 80 y.o. male in no acute distress and alert and oriented to person, place and time. Physical Exam  Constitutional: Patient in no acute distress. Neuro: Alert and oriented to person, place and time. Psych: Mood normal, affect normal  Skin: No rash noted  Lungs: Respiratory effort is normal  Cardiovascular: Warm & Pink  Abdomen: Soft, non-tender, non-distended with no CVA,  No flank tenderness,  Or hepatosplenomegaly   Lymphatics: No palpablelymphadenopathy. Bladder non-tender and not distended. Musculoskeletal: Normal gait and station      Assessment and Plan      1. Elevated PSA    2. Prostate cancer [facility])       cancer is a new diagnosis. Plan:          Had elevated PSA of 12.   biopsy shows prostate cancer, worst being Mack 8. Will obtain bone scan and CT scan. Return in about 3 weeks (around 3/16/2021). Prescriptions Ordered:  No orders of the defined types were placed in this encounter. Orders Placed:  Orders Placed This Encounter   Procedures    NM BONE SCAN WHOLE BODY     Standing Status:   Future     Standing Expiration Date:   2022    CT PELVIS WO CONTRAST Additional Contrast? None     Standing Status:   Future     Standing Expiration Date:   2022     Order Specific Question:   Additional Contrast?     Answer:   None           Jesus Sosa MD    Agree with the ROS entered by the MA.

## 2021-02-25 ENCOUNTER — TELEPHONE (OUTPATIENT)
Dept: FAMILY MEDICINE CLINIC | Age: 71
End: 2021-02-25

## 2021-03-09 ENCOUNTER — HOSPITAL ENCOUNTER (OUTPATIENT)
Dept: CT IMAGING | Age: 71
Discharge: HOME | End: 2021-03-11
Payer: COMMERCIAL

## 2021-03-09 ENCOUNTER — HOSPITAL ENCOUNTER (OUTPATIENT)
Dept: NUCLEAR MEDICINE | Age: 71
Discharge: HOME | End: 2021-03-11
Payer: COMMERCIAL

## 2021-03-09 VITALS — WEIGHT: 178 LBS | BODY MASS INDEX: 22.84 KG/M2 | HEIGHT: 74 IN

## 2021-03-09 DIAGNOSIS — C61 PROSTATE CANCER (HCC): ICD-10-CM

## 2021-03-09 PROCEDURE — 78306 BONE IMAGING WHOLE BODY: CPT

## 2021-03-09 PROCEDURE — 2580000003 HC RX 258: Performed by: UROLOGY

## 2021-03-09 PROCEDURE — 72192 CT PELVIS W/O DYE: CPT

## 2021-03-09 PROCEDURE — A9503 TC99M MEDRONATE: HCPCS | Performed by: UROLOGY

## 2021-03-09 PROCEDURE — 3430000000 HC RX DIAGNOSTIC RADIOPHARMACEUTICAL: Performed by: UROLOGY

## 2021-03-09 RX ORDER — SODIUM CHLORIDE 0.9 % (FLUSH) 0.9 %
10 SYRINGE (ML) INJECTION PRN
Status: DISCONTINUED | OUTPATIENT
Start: 2021-03-09 | End: 2021-03-12 | Stop reason: HOSPADM

## 2021-03-09 RX ORDER — TC 99M MEDRONATE 20 MG/10ML
25 INJECTION, POWDER, LYOPHILIZED, FOR SOLUTION INTRAVENOUS
Status: COMPLETED | OUTPATIENT
Start: 2021-03-09 | End: 2021-03-09

## 2021-03-09 RX ADMIN — Medication 10 ML: at 11:02

## 2021-03-09 RX ADMIN — TC 99M MEDRONATE 28.3 MILLICURIE: 20 INJECTION, POWDER, LYOPHILIZED, FOR SOLUTION INTRAVENOUS at 11:02

## 2021-03-16 ENCOUNTER — OFFICE VISIT (OUTPATIENT)
Dept: UROLOGY | Age: 71
End: 2021-03-16
Payer: COMMERCIAL

## 2021-03-16 VITALS
BODY MASS INDEX: 22.84 KG/M2 | HEART RATE: 84 BPM | WEIGHT: 178 LBS | DIASTOLIC BLOOD PRESSURE: 84 MMHG | HEIGHT: 74 IN | TEMPERATURE: 97.8 F | SYSTOLIC BLOOD PRESSURE: 138 MMHG

## 2021-03-16 DIAGNOSIS — C61 PROSTATE CANCER (HCC): ICD-10-CM

## 2021-03-16 DIAGNOSIS — R97.20 ELEVATED PSA: Primary | ICD-10-CM

## 2021-03-16 PROCEDURE — G8420 CALC BMI NORM PARAMETERS: HCPCS | Performed by: UROLOGY

## 2021-03-16 PROCEDURE — 99214 OFFICE O/P EST MOD 30 MIN: CPT | Performed by: UROLOGY

## 2021-03-16 PROCEDURE — 3017F COLORECTAL CA SCREEN DOC REV: CPT | Performed by: UROLOGY

## 2021-03-16 PROCEDURE — G8484 FLU IMMUNIZE NO ADMIN: HCPCS | Performed by: UROLOGY

## 2021-03-16 PROCEDURE — G8427 DOCREV CUR MEDS BY ELIG CLIN: HCPCS | Performed by: UROLOGY

## 2021-03-16 PROCEDURE — 1036F TOBACCO NON-USER: CPT | Performed by: UROLOGY

## 2021-03-16 PROCEDURE — 4040F PNEUMOC VAC/ADMIN/RCVD: CPT | Performed by: UROLOGY

## 2021-03-16 PROCEDURE — 1123F ACP DISCUSS/DSCN MKR DOCD: CPT | Performed by: UROLOGY

## 2021-03-16 ASSESSMENT — ENCOUNTER SYMPTOMS
BACK PAIN: 0
COUGH: 0
EYE REDNESS: 0
NAUSEA: 0
DIARRHEA: 0
VOMITING: 0
ABDOMINAL PAIN: 0
EYE PAIN: 0
WHEEZING: 0
CONSTIPATION: 0
SHORTNESS OF BREATH: 0

## 2021-03-16 NOTE — PROGRESS NOTES
Review of Systems   Constitutional: Negative for appetite change, chills and fatigue. Eyes: Negative for pain, redness and visual disturbance. Respiratory: Negative for cough, shortness of breath and wheezing. Cardiovascular: Positive for leg swelling. Negative for chest pain. Gastrointestinal: Negative for abdominal pain, constipation, diarrhea, nausea and vomiting. Genitourinary: Negative for difficulty urinating, dysuria, flank pain, frequency, hematuria and urgency. Musculoskeletal: Negative for back pain, joint swelling and myalgias. Skin: Positive for rash. Negative for wound. Neurological: Negative for dizziness, weakness and numbness. Hematological: Does not bruise/bleed easily.

## 2021-03-16 NOTE — PROGRESS NOTES
[address] [address]  Dept: [address] [facility] Urology Office Note - Established    Patient:  Baljit Weeks  YOB: 1950  Date: 3/16/2021    The patient is a 80 y.o. male who presents todayfor evaluation of the following problems:   Chief Complaint   Patient presents with    Prostate Cancer    Results     CT and bone scan        HPI  He is here in follow up for prostate cancer. He had a biopsy, which did show Zoraida 8 disease. He had a met eval and is here today. He does have a history of a kidney transplant. Summary of old records: N/A    Additional History: N/A    Procedures Today: N/A    Urinalysis today:  No results found for this visit on 03/16/21. Last several PSA's:  Lab Results   Component Value Date    PSA 12.22 (H) 08/14/2020    PSA 3.2 02/16/2018    PSA 2.4 05/02/2017     Last total testosterone:  No results found for: TESTOSTERONE    AUA Symptom Score (3/16/2021):   INCOMPLETE EMPTYING: How often have you had the sensation of not emptying your bladder?: Not at all  FREQUENCY: How often do you have to urinate less than every two hours?: Not at all  INTERMITTENCY: How often have you found you stopped and started again several times when you urinated?: Less than 1 to 5 times  URGENCY: How often have you found it difficult to postpone urination?: About Half the time  WEAK STREAM: How often have you had a weak urinary stream?: Less than 1 to 5 times  STRAINING: How often have you had to strain to start  urination?: Less than 1 to 5 times  NOCTURIA: How many times did you typically get up at night to uriniate?: 5 Times  TOTAL I-PSS SCORE[de-identified] 11  How would you feel if you were to spend the rest of your life with your urinary condition?: Unhappy    Last BUN and creatinine:  Lab Results   Component Value Date    BUN 41 (H) 01/29/2021     Lab Results   Component Value Date    CREATININE 2.68 (H) 01/29/2021       Additional Lab/Culture results: none    Imaging Reviewed during this Office Visit:   CT reviewed and negative  Bone scan images reviewed and negative.    (results were independently reviewed by physician and radiology report verified)    PAST MEDICAL, FAMILY AND SOCIAL HISTORY UPDATE:  Past Medical History:   Diagnosis Date    Anemia     CAD (coronary artery disease)     CKD (chronic kidney disease) stage 4, GFR 15-29 ml/min (HCC)     Elevated PSA, between 10 and less than 20 ng/ml     [facility] stage kidney disease ([facility] [address]     Hemod[facility])     patient had Kidney transplant    Hepatitis C without hepatic coma     Hyperlipidemia     Hypertension     Irregular heart rate     wearing holter monitor 1/29/21    Leukopenia 01/29/2021    Palpitations     [facility]    [facility]    Thrombocytopenia ([facility] [address] 01/29/2021    Type 2 diabetes mellitus ([facility] [address]      Past Surgical History:   Procedure Laterality Date    CARDIAC CATHETERIZATION      Patient states normal    COLONOSCOPY N/A 10/30/2019    COLONOSCOPY WITH BIOPSY performed by James Bejarano MD at [address] [place] [address] Right 11/2015    at [facility]    left inguinal    KIDNEY TRANSPLANT  2017    right    PROSTATE BIOPSY N/A 2/12/2021    PROSTATE BIOPSY WITH ULTRASOUND TO OR WITH TECH performed by Gustavo Bello [facility] at [facility]  2/12/2021    US PROSTATE NEEDLE PUNCH 2/12/2021 [facility] ULTRASOUND     Family History   Problem Relation Age of Onset    Cancer Mother         Lymphoma    Lung Cancer Father     Colon Cancer Brother      Outpatient Medications Marked as Taking for the 3/16/21 encounter (Office Visit) with Gustavo Bello MD   Medication Sig Dispense Refill    insulin glargine (BASAGLAR KWIKPEN) 100 UNIT/ML injection pen Inject 18 Units into the skin nightly (Patient taking differently: Inject 15 Units into the skin nightly Per quittin.9   Smokeless Tobacco Never Used   Tobacco Comment    No smoking in at least 3 years     (Ifpatient a smoker, smoking cessation counseling offered)    Social History     Substance and Sexual Activity   Alcohol Use No    Comment: No ETOH in 3 years       REVIEW OF SYSTEMS:  Review of Systems    Physical Exam:      Vitals:    21 1139   BP: 138/84   Pulse:    Temp:      Body mass index is 22.85 kg/m². Patient is a 80 y.o. male in no acute distress and alert and oriented to person, place and time. Physical Exam  Constitutional: Patient in no acute distress. Neuro: Alert and oriented to person, place and time. Psych: Mood normal, affect normal  Skin: No rash noted  Lungs: Respiratory effort is normal  Cardiovascular: Warm & Pink  Abdomen: Soft, non-tender, non-distended with no CVA,  No flank tenderness,  Or hepatosplenomegaly   Lymphatics: No palpablelymphadenopathy. Bladder non-tender and not distended. Musculoskeletal: Normal gait and station      Assessment and Plan      1. Elevated PSA    2. Prostate cancer [facility])           Plan:          Has high risk prostate cancer, Zoraida 8. We discussed his options. He is interested in surgery  He has had a renal transplant, but would likely be able to move forward with radical prostatectomy. Return in about 2 weeks (around 3/30/2021). Prescriptions Ordered:  No orders of the defined types were placed in this encounter. Orders Placed:  No orders of the defined types were placed in this encounter. Qian Staton MD    Agree with the ROS entered by the MA.

## 2021-04-01 ENCOUNTER — OFFICE VISIT (OUTPATIENT)
Dept: UROLOGY | Age: 71
End: 2021-04-01
Payer: COMMERCIAL

## 2021-04-01 VITALS
HEART RATE: 83 BPM | OXYGEN SATURATION: 99 % | DIASTOLIC BLOOD PRESSURE: 74 MMHG | SYSTOLIC BLOOD PRESSURE: 160 MMHG | TEMPERATURE: 97.6 F

## 2021-04-01 DIAGNOSIS — R97.20 ELEVATED PSA: ICD-10-CM

## 2021-04-01 DIAGNOSIS — C61 PROSTATE CANCER (HCC): Primary | ICD-10-CM

## 2021-04-01 PROCEDURE — G8420 CALC BMI NORM PARAMETERS: HCPCS | Performed by: UROLOGY

## 2021-04-01 PROCEDURE — 1036F TOBACCO NON-USER: CPT | Performed by: UROLOGY

## 2021-04-01 PROCEDURE — 99214 OFFICE O/P EST MOD 30 MIN: CPT | Performed by: UROLOGY

## 2021-04-01 PROCEDURE — G8427 DOCREV CUR MEDS BY ELIG CLIN: HCPCS | Performed by: UROLOGY

## 2021-04-01 PROCEDURE — 3017F COLORECTAL CA SCREEN DOC REV: CPT | Performed by: UROLOGY

## 2021-04-01 PROCEDURE — 4040F PNEUMOC VAC/ADMIN/RCVD: CPT | Performed by: UROLOGY

## 2021-04-01 PROCEDURE — 1123F ACP DISCUSS/DSCN MKR DOCD: CPT | Performed by: UROLOGY

## 2021-04-01 ASSESSMENT — ENCOUNTER SYMPTOMS
BACK PAIN: 0
COUGH: 0
VOMITING: 0
WHEEZING: 0
EYE REDNESS: 0
DIARRHEA: 0
ABDOMINAL PAIN: 0
SHORTNESS OF BREATH: 0
EYE PAIN: 0
NAUSEA: 0
CONSTIPATION: 0

## 2021-04-01 NOTE — PROGRESS NOTES
report verified)    PAST MEDICAL, FAMILY AND SOCIAL HISTORY UPDATE:  Past Medical History:   Diagnosis Date    Anemia     CAD (coronary artery disease)     CKD (chronic kidney disease) stage 4, GFR 15-29 ml/min (HCC)     Elevated PSA, between 10 and less than 20 ng/ml     [facility] stage kidney disease ([facility] [address]     Hemod[facility])     patient had Kidney transplant    Hepatitis C without hepatic coma     Hyperlipidemia     Hypertension     Irregular heart rate     wearing holter monitor 1/29/21    Leukopenia 01/29/2021    Palpitations     [facility]/2017    [facility]    Thrombocytopenia ([facility] [address] 01/29/2021    Type 2 diabetes mellitus ([facility] [address]      Past Surgical History:   Procedure Laterality Date    CARDIAC CATHETERIZATION      Patient states normal    COLONOSCOPY N/A 10/30/2019    COLONOSCOPY WITH BIOPSY performed by Charlene Angeles MD at [address] [place] [address] 11/2015    at [facility] 1045    left inguinal    KIDNEY TRANSPLANT  2017    right    PROSTATE BIOPSY N/A 2/12/2021    PROSTATE BIOPSY WITH ULTRASOUND TO OR WITH TECH performed by Ammy Vargas MD at [facility]  2/12/2021    US PROSTATE NEEDLE PUNCH 2/12/2021 [facility] ULTRASOUND     Family History   Problem Relation Age of Onset    Cancer Mother         Lymphoma    Lung Cancer Father     Colon Cancer Brother      Outpatient Medications Marked as Taking for the 4/1/21 encounter (Office Visit) with Eyad Spivey MD   Medication Sig Dispense Refill    insulin glargine (BASAGLAR KWIKPEN) 100 UNIT/ML injection pen Inject 18 Units into the skin nightly (Patient taking differently: Inject 15 Units into the skin nightly Per Dr. Zara De La Rosa) 5 pen 3    Vitamin E 180 MG capsule Take 180 mg by mouth daily      vitamin B-12 (CYANOCOBALAMIN) 1000 MCG tablet Take 1,000 mcg by mouth daily      Ascorbic Acid (VITAMIN C ADULT GUMMIES PO) Take 250 mg by mouth daily      Insulin Pen Needle (TECHLITE PEN NEEDLES) 32G X 6 MM MISC USE AS DIRECTED. WITH INSULIN PEN UP TO 5 TIMES PER  each 3    atorvastatin (LIPITOR) 40 MG tablet TAKE 1 TABLET BY MOUTH DAILY 90 tablet 0    FREESTYLE LITE strip USE TO TEST TWICE A DAY AS DIRECTED 50 each 5    tamsulosin (FLOMAX) 0.4 MG capsule TAKE 1 CAPSULE BY MOUTH DAILY 90 capsule 0    famotidine (PEPCID) 20 MG tablet TAKE 1 TABLET BY MOUTH TWICE A DAY 60 tablet 3    Blood Glucose Monitoring Suppl (ONE TOUCH ULTRA 2) w/Device KIT Check blood sugar four times daily 1 kit 0    NIFEdipine (PROCARDIA XL) 90 MG extended release tablet TAKE 1 TABLET BY MOUTH DAILY 90 tablet 1    Dulaglutide (TRULICITY) 1.5 AY/0.5SR SOPN Inject 1.5 mg into the skin once a week 4 pen 0    metoprolol succinate (TOPROL XL) 50 MG extended release tablet Take 1 tablet by mouth daily 90 tablet 1    Everolimus 0.75 MG TABS 2 times daily 3 tabs 2 times daily      Lancets MISC 1 each by Does not apply route daily 100 each 5    Insulin Pen Needle (ULTICARE MINI PEN NEEDLES) 31G X 6 MM MISC PATIENT IS TESTING UP TO 5 TIMES DAILY 100 each 3    magnesium oxide (MAG-OX) 400 (241.3 Mg) MG TABS tablet TAKE 1 TABLET BY MOUTH TWICE A DAY 60 tablet 2    insulin lispro (HUMALOG KWIKPEN) 100 UNIT/ML pen Per Sliding scale.  Max 28 units per day 3 pen 5    tacrolimus (PROGRAF) 1 MG capsule Take 6 mg by mouth 2 times daily          Amino acids and Lisinopril  Social History     Tobacco Use   Smoking Status Former Smoker    Packs/day: 0.50    Years: 50.00    Pack years: 25.00    Types: Cigarettes    Quit date: 2013    Years since quittin.0   Smokeless Tobacco Never Used   Tobacco Comment    No smoking in at least 3 years     (Ifpatient a smoker, smoking cessation counseling offered)    Social History     Substance and Sexual Activity   Alcohol Use No    Comment: No ETOH in 3 years       REVIEW OF SYSTEMS:  Review of Systems    Physical Exam:      Vitals:    21 1107   BP: (!) 160/74   Pulse:    Temp:    SpO2:      There is no height or weight on file to calculate BMI. Patient is a 80 y.o. male in no acute distress and alert and oriented to person, place and time. Physical Exam  Constitutional: Patient in no acute distress. Neuro: Alert and oriented to person, place and time. Psych: Mood normal, affect normal  Skin: No rash noted  HEENT: Head: Normocephalic andatraumatic  Conjunctivae and EOM are normal. Pupils are equal, round  Nose:Normal  Right External Ear: Normal; Left External Ear: Normal  Mouth: Mucosa Moist  Neck: Supple  Lungs: Respiratory effort is normal  Cardiovascular: Warm & Pink  Abdomen: Soft, non-tender, non-distended with no CVA,  No flank     Assessment and Plan      1. Prostate cancer ([address]    2. Elevated PSA           Plan:     discussed for 40 min about surgery and scheduling  Do robo prostate at [facility], cat 2  Cysto stent placement same time  Standard and left plnd  HAS TRANSPLANT KIDNEY ON RIGHT PELVIS, CAREFUL ON ENTRY AND WATCH URETER. PUT STENT WITH STRING IN TRANSPLANT URETER TO IDENTIFY DURING SURGERY  NEED NEPH AND CARDIAC CLEARANCE  Return for Surgery. Prescriptions Ordered:  No orders of the defined types were placed in this encounter. Orders Placed:  No orders of the defined types were placed in this encounter. Farooq Munguia MD    Agree with the ROS entered by the MA.

## 2021-04-02 DIAGNOSIS — Z76.0 MEDICATION REFILL: ICD-10-CM

## 2021-04-02 RX ORDER — TAMSULOSIN HYDROCHLORIDE 0.4 MG/1
0.4 CAPSULE ORAL DAILY
Qty: 90 CAPSULE | Refills: 1 | Status: ON HOLD | OUTPATIENT
Start: 2021-04-02 | End: 2021-05-20 | Stop reason: HOSPADM

## 2021-04-02 NOTE — TELEPHONE ENCOUNTER
Anny Grey is calling to request a refill on the following medication(s):    Medication Request:  Requested Prescriptions     Pending Prescriptions Disp Refills    tamsulosin (FLOMAX) 0.4 MG capsule [Pharmacy Med Name: Tamsulosin HCl 0.4MG CAPS] 90 capsule 0     Sig: TAKE 1 CAPSULE BY MOUTH DAILY       Last Visit Date (If Applicable):  8/35/1780    Next Visit Date:    5/24/2021

## 2021-04-05 DIAGNOSIS — Z76.0 MEDICATION REFILL: ICD-10-CM

## 2021-04-05 DIAGNOSIS — K21.9 GASTROESOPHAGEAL REFLUX DISEASE WITHOUT ESOPHAGITIS: ICD-10-CM

## 2021-04-05 RX ORDER — FAMOTIDINE 20 MG/1
TABLET, FILM COATED ORAL
Qty: 60 TABLET | Refills: 3 | Status: SHIPPED | OUTPATIENT
Start: 2021-04-05 | End: 2021-07-22

## 2021-04-05 RX ORDER — GABAPENTIN 100 MG/1
CAPSULE ORAL
Qty: 180 CAPSULE | Refills: 0 | Status: SHIPPED | OUTPATIENT
Start: 2021-04-05 | End: 2021-07-22

## 2021-04-05 NOTE — TELEPHONE ENCOUNTER
Babatunde Xiong is calling to request a refill on the following medication(s):    Medication Request:  Requested Prescriptions     Pending Prescriptions Disp Refills    famotidine (PEPCID) 20 MG tablet [Pharmacy Med Name: Famotidine 20MG TABS] 60 tablet 3     Sig: TAKE 1 TABLET BY MOUTH TWICE A DAY    gabapentin (NEURONTIN) 100 MG capsule [Pharmacy Med Name: Gabapentin 100MG CAPS] 180 capsule 0     Sig: TAKE 1 CAPSULE BY MOUTH TWICE A DAY       Last Visit Date (If Applicable):  1/38/2841    Next Visit Date:    5/24/2021      Last Refill:  12/21/2020

## 2021-04-07 RX ORDER — EVEROLIMUS TABLETS 0.75 MG/1
TABLET ORAL 2 TIMES DAILY
Qty: 60 TABLET | Refills: 1 | OUTPATIENT
Start: 2021-04-07

## 2021-04-07 RX ORDER — EVEROLIMUS 1 MG/1
1 TABLET ORAL 2 TIMES DAILY
Qty: 60 TABLET | Refills: 1 | OUTPATIENT
Start: 2021-04-07

## 2021-04-14 ENCOUNTER — TELEPHONE (OUTPATIENT)
Dept: UROLOGY | Age: 71
End: 2021-04-14

## 2021-04-14 DIAGNOSIS — C61 PROSTATE CANCER (HCC): Primary | ICD-10-CM

## 2021-05-03 ENCOUNTER — TELEPHONE (OUTPATIENT)
Dept: FAMILY MEDICINE CLINIC | Age: 71
End: 2021-05-03

## 2021-05-03 DIAGNOSIS — Z76.0 MEDICATION REFILL: ICD-10-CM

## 2021-05-03 RX ORDER — ATORVASTATIN CALCIUM 40 MG/1
TABLET, FILM COATED ORAL
Qty: 90 TABLET | Refills: 0 | Status: SHIPPED | OUTPATIENT
Start: 2021-05-03 | End: 2021-05-28

## 2021-05-03 NOTE — TELEPHONE ENCOUNTER
Last OV:02-  Next OV: 05-    Spoke with Matias Hilliard who see's Dr. Destiney patton at [facility].  The patient is requesting a refill of the lipitor

## 2021-05-03 NOTE — TELEPHONE ENCOUNTER
Pt having procedure for prostrate cancer on 5/19/21. He needs to know if there is any medications that should be stopped prior to the procedure?   The Surgeon told him to ask but did not name any medications of concern, please advise

## 2021-05-04 ENCOUNTER — OFFICE VISIT (OUTPATIENT)
Dept: UROLOGY | Age: 71
End: 2021-05-04
Payer: COMMERCIAL

## 2021-05-04 ENCOUNTER — HOSPITAL ENCOUNTER (OUTPATIENT)
Dept: PREADMISSION TESTING | Age: 71
Discharge: HOME | End: 2021-05-08
Payer: COMMERCIAL

## 2021-05-04 VITALS
TEMPERATURE: 97.3 F | SYSTOLIC BLOOD PRESSURE: 159 MMHG | BODY MASS INDEX: 23.74 KG/M2 | WEIGHT: 185 LBS | RESPIRATION RATE: 18 BRPM | OXYGEN SATURATION: 99 % | DIASTOLIC BLOOD PRESSURE: 74 MMHG | HEART RATE: 81 BPM | HEIGHT: 74 IN

## 2021-05-04 VITALS
TEMPERATURE: 97.2 F | SYSTOLIC BLOOD PRESSURE: 141 MMHG | BODY MASS INDEX: 23.74 KG/M2 | WEIGHT: 185 LBS | HEART RATE: 74 BPM | HEIGHT: 74 IN | DIASTOLIC BLOOD PRESSURE: 71 MMHG

## 2021-05-04 DIAGNOSIS — C61 PROSTATE CANCER (HCC): Primary | ICD-10-CM

## 2021-05-04 LAB
ANION GAP SERPL CALCULATED.3IONS-SCNC: 10 MMOL/L (ref 9–17)
BUN BLDV-MCNC: 39 MG/DL (ref 8–23)
CHLORIDE BLD-SCNC: 104 MMOL/L (ref 98–107)
CO2: 23 MMOL/L (ref 20–31)
CREAT SERPL-MCNC: 2.81 MG/DL (ref 0.7–1.2)
GFR AFRICAN AMERICAN: 27 ML/MIN
GFR NON-AFRICAN AMERICAN: 22 ML/MIN
GFR SERPL CREATININE-BSD FRML MDRD: ABNORMAL ML/MIN/{1.73_M2}
GFR SERPL CREATININE-BSD FRML MDRD: ABNORMAL ML/MIN/{1.73_M2}
GLUCOSE BLD-MCNC: 171 MG/DL (ref 70–99)
HCT VFR BLD CALC: 30.7 % (ref 40.7–50.3)
HEMOGLOBIN: 10 G/DL (ref 13–17)
MCH RBC QN AUTO: 30.1 PG (ref 25.2–33.5)
MCHC RBC AUTO-ENTMCNC: 32.6 G/DL (ref 28.4–34.8)
MCV RBC AUTO: 92.5 FL (ref 82.6–102.9)
NRBC AUTOMATED: 0 PER 100 WBC
PDW BLD-RTO: 12.5 % (ref 11.8–14.4)
PLATELET # BLD: 120 K/UL (ref 138–453)
PMV BLD AUTO: 11.7 FL (ref 8.1–13.5)
POTASSIUM SERPL-SCNC: 5 MMOL/L (ref 3.7–5.3)
RBC # BLD: 3.32 M/UL (ref 4.21–5.77)
SODIUM BLD-SCNC: 137 MMOL/L (ref 135–144)
WBC # BLD: 3.2 K/UL (ref 3.5–11.3)

## 2021-05-04 PROCEDURE — 86901 BLOOD TYPING SEROLOGIC RH(D): CPT

## 2021-05-04 PROCEDURE — 86920 COMPATIBILITY TEST SPIN: CPT

## 2021-05-04 PROCEDURE — 3017F COLORECTAL CA SCREEN DOC REV: CPT | Performed by: NURSE PRACTITIONER

## 2021-05-04 PROCEDURE — 1123F ACP DISCUSS/DSCN MKR DOCD: CPT | Performed by: NURSE PRACTITIONER

## 2021-05-04 PROCEDURE — 36415 COLL VENOUS BLD VENIPUNCTURE: CPT

## 2021-05-04 PROCEDURE — 99214 OFFICE O/P EST MOD 30 MIN: CPT | Performed by: NURSE PRACTITIONER

## 2021-05-04 PROCEDURE — G8427 DOCREV CUR MEDS BY ELIG CLIN: HCPCS | Performed by: NURSE PRACTITIONER

## 2021-05-04 PROCEDURE — G8420 CALC BMI NORM PARAMETERS: HCPCS | Performed by: NURSE PRACTITIONER

## 2021-05-04 PROCEDURE — 1036F TOBACCO NON-USER: CPT | Performed by: NURSE PRACTITIONER

## 2021-05-04 PROCEDURE — 82947 ASSAY GLUCOSE BLOOD QUANT: CPT

## 2021-05-04 PROCEDURE — 86900 BLOOD TYPING SEROLOGIC ABO: CPT

## 2021-05-04 PROCEDURE — 80051 ELECTROLYTE PANEL: CPT

## 2021-05-04 PROCEDURE — 86850 RBC ANTIBODY SCREEN: CPT

## 2021-05-04 PROCEDURE — 87086 URINE CULTURE/COLONY COUNT: CPT

## 2021-05-04 PROCEDURE — 4040F PNEUMOC VAC/ADMIN/RCVD: CPT | Performed by: NURSE PRACTITIONER

## 2021-05-04 PROCEDURE — 82565 ASSAY OF CREATININE: CPT

## 2021-05-04 PROCEDURE — 84520 ASSAY OF UREA NITROGEN: CPT

## 2021-05-04 PROCEDURE — 85027 COMPLETE CBC AUTOMATED: CPT

## 2021-05-04 PROCEDURE — 93005 ELECTROCARDIOGRAM TRACING: CPT | Performed by: ANESTHESIOLOGY

## 2021-05-04 RX ORDER — EVEROLIMUS 1 MG/1
1 TABLET ORAL 2 TIMES DAILY
COMMUNITY
Start: 2021-04-06 | End: 2021-12-07

## 2021-05-04 RX ORDER — SODIUM CHLORIDE 9 MG/ML
INJECTION, SOLUTION INTRAVENOUS PRN
Status: CANCELLED | OUTPATIENT
Start: 2021-05-04

## 2021-05-04 RX ORDER — SODIUM CHLORIDE, SODIUM LACTATE, POTASSIUM CHLORIDE, CALCIUM CHLORIDE 600; 310; 30; 20 MG/100ML; MG/100ML; MG/100ML; MG/100ML
1000 INJECTION, SOLUTION INTRAVENOUS CONTINUOUS
Status: CANCELLED | OUTPATIENT
Start: 2021-05-04

## 2021-05-04 ASSESSMENT — ENCOUNTER SYMPTOMS
EYE PAIN: 0
EYE REDNESS: 0
SHORTNESS OF BREATH: 0
BACK PAIN: 0
DIARRHEA: 0
COUGH: 0
NAUSEA: 0
VOMITING: 0
CONSTIPATION: 0
ABDOMINAL PAIN: 0
WHEEZING: 0

## 2021-05-04 NOTE — LETTER
[address] [address]  Dept: [phone]  Dept Fax: [phone]        5/4/21    Patient: Silvestre Schumacher  YOB: 1950    Dear Lesley Sever, DO,    I had the pleasure of seeing one of your patients, Domingo Byrne today in the office today. Below are the relevant portions of my assessment and plan of care. IMPRESSION:  Prostate cancer     PLAN:   1. Start kegels exercise to help with post op incontinence: do not do kegel or quick flick with cath in- do pre-operation and after catheteri is removed  Kegel exercise: squeeze and hold 3 seconds, relax and release 6 seconds, repeat 10-15 times in a row, at lease 6 times  per day  Quick flicks: after kegel, squeeze, release no hold, 12-15 times in a row, at least 6 times per day    2. Pre-op bowel cleanse as ordered- clear liquids the day before     3. Avoid post-op constipation as discussed    4. Follow-up Cystograms scheduled 1 week after surgery at [facility] , start antibiotic the day before the cystogram, take the day off and complete the day after, call the office if you did not get a prescription for antibiotic at discharge. After cystogram, come directly to office for post-op appt    5. Call with questions or concerns    6. No vacuum pump at this time   Thank you for allowing me to participate in the care of this patient. I will keep you updated on this patient's follow up and I look forward to serving you and your patients again in the future.     Balwinder Blake, BJORN - CNP

## 2021-05-04 NOTE — PROGRESS NOTES
Anesthesia Focused Assessment    Has patient ever tested positive for COVID? Yes, 3/2020. He has since been vaccinated. STOP-BANG Sleep Apnea Questionnaire    SNORE loudly (heard through closed doors)? No  TIRED, fatigued, sleepy during daytime? No  OBSERVED stopping breathing during sleep? No  High blood PRESSURE being treated? Yes    BMI over 35? No  AGE over 48? Yes  NECK circumference over 16\"? No  GENDER (male)? Yes             Total 3  High risk 5-8  Intermediate risk 3-4  Low risk 0-2    Obstructive Sleep Apnea: denies  If YES, machine used: no     Type 1 DM:   no  T2DM:  yes    Coronary Artery Disease:  Yes, follows with [address]. Hypertension:  yes    Active smoker:  no  Drinks Alcohol:  no    Dentition: missing teeth, no dentures    Defib / AICD / Pacemaker: no      Renal Failure/dialysis:  no    Patient was evaluated in PAT & anesthesia guidelines were applied. NPO guidelines, medication instructions and scheduled arrival time were reviewed with patient. Hx of anesthesia complications:  no  Family hx of anesthesia complications:  no                                                                                                                     Anesthesia contacted:   no  Medical or cardiac clearance ordered: cardiac clearance is in the chart. Nephrology clearance was requested by surgeon, but was sent to a nephrologist the patient no longer sees. Will update surgeon's office with Dr. Sarah Tang name instead [facility]).     Kristen Mahajan PA-C  5/4/21  1:31 PM

## 2021-05-04 NOTE — PROGRESS NOTES
often have you found you stopped and started again several times when you urinated?: Not at all  URGENCY: How often have you found it difficult to postpone urination?: Less than Half the time  WEAK STREAM: How often have you had a weak urinary stream?: Less than 1 to 5 times  STRAINING: How often have you had to strain to start  urination?: Not at all  NOCTURIA: How many times did you typically get up at night to uriniate?: 5 Times  TOTAL I-PSS SCORE[de-identified] 10  How would you feel if you were to spend the rest of your life with your urinary condition?: Terrible    Last BUN and creatinine:  Lab Results   Component Value Date    BUN 39 (H) 05/04/2021     Lab Results   Component Value Date    CREATININE 2.81 (H) 05/04/2021       Additional Lab/Culture results:   2/16/2021 12:10 PM - Filipe, Chaya Incoming Lab Results From [facility] Collected Lab   Surgical Pathology Report 02/12/2021  8:26  [facility]   [id number]   [address] [facility]   [address]   [phone]   Fax: [phone]   SURGICAL PATHOLOGY REPORT     Patient Name: Ana Garcia   MR#: [id number]   Specimen #[id number]         Final Diagnosis       SPECIMEN \"A\":  PROSTATE, BASE LEFT MEDIAL, ULTRASOUND-GUIDED CORE   BIOPSY:          PROSTATIC ADENOCARCINOMA, ACINAR TYPE, GRADE GROUP 4, ABDULLAHI   SCORE (4+4=8)     TUMOR INVOLVES ONE-OF-ONE CORE, MEASURES 1 MM, COMPRISES 10% OF THE   PROSTATE TISSUE     FOCAL PERINEURAL INVASION PRESENT     SPECIMEN \"B\":  PROSTATE, BASE LEFT LATERAL, ULTRASOUND-GUIDED CORE   BIOPSY:          PROSTATIC ADENOCARCINOMA, ACINAR TYPE, GRADE GROUP 1, ABDULLAHI   SCORE (3+3=6)     TUMOR PRESENT IN ONE-OF-ONE CORE, MEASURES 2 MM AND COMPRISES 25% OF   THE PROSTATE TISSUE     SPECIMEN \"C\":  PROSTATE, MID LEFT MEDIAL, ULTRASOUND-GUIDED CORE   BIOPSY:          PROSTATIC ADENOCARCINOMA, ACINAR TYPE, GRADE GROUP 1, ABDULLAHI   SCORE (3+3=6)     TUMOR PRESENT IN ONE-OF-ONE CORE, MEASURES 4 MM AND COMPRISES 35% OF   THE PROSTATE TISSUE     BENIGN COLON TISSUE     SPECIMEN \"D\":  PROSTATE, MID LEFT LATERAL, ULTRASOUND-GUIDED CORE   BIOPSY:          PROSTATIC ADENOCARCINOMA, ACINAR TYPE, GRADE GROUP 2, ABDULLAHI   SCORE (3+4=7)     TUMOR INVOLVES ONE-OF-ONE CORE, MEASURES 3 MM AND COMPRISES 38% OF THE   PROSTATE TISSUE     PERINEURAL INVASION PRESENT     SPECIMEN \"E\":  PROSTATE, APEX LEFT MEDIAL, ULTRASOUND-GUIDED CORE   BIOPSY:          PROSTATIC ADENOCARCINOMA, ACINAR TYPE, GRADE GROUP 1, ABDULLAHI   SCORE (3+3=6)     TUMOR PRESENT IN ONE-OF-ONE CORE, MEASURES 2.5 MM AND COMPRISES 45% OF   THE PROSTATE TISSUE     PERINEURAL INVASION PRESENT     BENIGN COLON TISSUE     SPECIMEN \"F\":  PROSTATE, APEX LEFT LATERAL, ULTRASOUND-GUIDED CORE   BIOPSY:          PROSTATIC ADENOCARCINOMA, GRADE GROUP 1, ABDULLAHI SCORE (3+3=6)     TUMOR PRESENT IN ONE-OF-ONE CORE, MEASURES 5 MM AND COMPRISES 77% OF   THE PROSTATE TISSUE     BENIGN COLON TISSUE     SPECIMEN \"G\":  PROSTATE, BASE RIGHT MEDIAL, ULTRASOUND-GUIDED CORE   BIOPSY:          BENIGN PROSTATE AND COLON TISSUE     SPECIMEN \"H\":  PROSTATE, BASE RIGHT LATERAL, ULTRASOUND-GUIDED CORE   BIOPSY:          BENIGN PROSTATE AND COLON TISSUE     SPECIMEN \"I\":  PROSTATE, MID RIGHT MEDIAL, ULTRASOUND-GUIDED CORE   BIOPSY:          ATYPICAL SMALL ACINAR PROLIFERATION (ASAP) AND HIGH-GRADE   PROSTATIC INTRAEPITHELIAL NEOPLASIA (PIN)     SPECIMEN \"J\":  PROSTATE, MID RIGHT LATERAL, ULTRASOUND-GUIDED CORE   BIOPSY:          HIGH-GRADE PROSTATIC INTRAEPITHELIAL NEOPLASIA (PIN)     BENIGN COLON TISSUE     SPECIMEN \"K\":  PROSTATE, APEX RIGHT MEDIAL, ULTRASOUND-GUIDED CORE   BIOPSY:          PROSTATIC ADENOCARCINOMA, ACINAR TYPE, GRADE GROUP 2, ABDULLAHI   SCORE (3+4=7)     TUMOR PRESENT IN ONE-OF-ONE CORE, MEASURES 1.5 MM AND COMPRISES 17% OF   THE PROSTATE TISSUE     PERINEURAL INVASION PRESENT     BENIGN COLON TISSUE     SPECIMEN \"L\":  PROSTATE, APEX RIGHT LATERAL, ULTRASOUND-GUIDED CORE   BIOPSY:          HIGH-GRADE PROSTATIC INTRAEPITHELIAL NEOPLASIA (PIN)     BENIGN COLON TISSUE     Diagnosis Comment   The malignancy diagnosis is confirmed by review of a second   pathologist. Simran Dunn M.D.   **Electronically Signed Out**         clj/2/15/2021        Imaging Reviewed during this Office Visit:   (results were independently reviewed by physician and radiology report verified)    PAST MEDICAL, FAMILY AND SOCIAL HISTORY UPDATE:  Past Medical History:   Diagnosis Date    Anemia     CAD (coronary artery disease)     ([address] [facility])    CKD (chronic kidney disease) stage 4, GFR 15-29 ml/min [facility]     COVID-19 03/2020    Elevated PSA, between 10 and less than 20 ng/ml     End stage kidney disease ([facility]     Hemodialysis access, fistula mature ([facility]     right arm    Hemodialysis patient ([facility]     patient had Kidney transplant-no longer on dialysis    Hepatitis C without hepatic coma     Hx of inguinal hernia repair     Hyperlipidemia     Hypertension     Irregular heart rate     wearing holter monitor 1/29/21    Leukopenia 01/29/2021    Palpitations     Prostate cancer ([facility]     prostate    [facility]    Thrombocytopenia ([facility] 01/29/2021    Type 2 diabetes mellitus [facility]     Under care of team 05/04/2021    [facility]phrology-[facility]-last visit april 2021    Under care of team 05/04/2021    [facility]-last visit onc 2020    Wellness examination 05/04/2021    pcp-Dr Cruz[facility]-last visit march 2021     Past Surgical History:   Procedure Laterality Date    CARDIAC CATHETERIZATION      Patient states normal    COLONOSCOPY N/A 10/30/2019    COLONOSCOPY WITH BIOPSY performed by Shan Charles MD at [address] [place] [address] Right 11/2015    at [facility]    left inguinal    KIDNEY TRANSPLANT  2017    right    LIVER BIOPSY  PROSTATE BIOPSY N/A 2/12/2021    PROSTATE BIOPSY WITH ULTRASOUND TO OR WITH TECH performed by Parker Norman MD at [facility]  2/12/2021    US PROSTATE NEEDLE PUNCH 2/12/2021 [facility] ULTRASOUND     Family History   Problem Relation Age of Onset    Cancer Mother         Lymphoma    Lung Cancer Father     Colon Cancer Brother      Outpatient Medications Marked as Taking for the 5/4/21 encounter (Office Visit) with BJORN Antunez CNP   Medication Sig Dispense Refill    ZORTRESS 1 MG TABS Take 1 mg by mouth 2 times daily      atorvastatin (LIPITOR) 40 MG tablet TAKE 1 TABLET BY MOUTH DAILY 90 tablet 0    famotidine (PEPCID) 20 MG tablet TAKE 1 TABLET BY MOUTH TWICE A DAY 60 tablet 3    gabapentin (NEURONTIN) 100 MG capsule TAKE 1 CAPSULE BY MOUTH TWICE A  capsule 0    tamsulosin (FLOMAX) 0.4 MG capsule TAKE 1 CAPSULE BY MOUTH DAILY 90 capsule 1    insulin glargine (BASAGLAR KWIKPEN) 100 UNIT/ML injection pen Inject 18 Units into the skin nightly (Patient taking differently: Inject 15 Units into the skin nightly Per Dr. Bebeto Arriaga) 5 pen 3    Insulin Pen Needle (TECHLITE PEN NEEDLES) 32G X 6 MM MISC USE AS DIRECTED.  WITH INSULIN PEN UP TO 5 TIMES PER  each 3    FREESTYLE LITE strip USE TO TEST TWICE A DAY AS DIRECTED 50 each 5    Blood Glucose Monitoring Suppl (ONE TOUCH ULTRA 2) w/Device KIT Check blood sugar four times daily 1 kit 0    NIFEdipine (PROCARDIA XL) 90 MG extended release tablet TAKE 1 TABLET BY MOUTH DAILY 90 tablet 1    metoprolol succinate (TOPROL XL) 50 MG extended release tablet Take 1 tablet by mouth daily 90 tablet 1    Everolimus 0.75 MG TABS 2 times daily 3 tabs 2 times daily      Lancets MISC 1 each by Does not apply route daily 100 each 5    Insulin Pen Needle (ULTICARE MINI PEN NEEDLES) 31G X 6 MM MISC PATIENT IS TESTING UP TO 5 TIMES DAILY 100 each 3    magnesium oxide (MAG-OX) 400 (241.3 Mg) MG TABS tablet TAKE 1 TABLET BY MOUTH a row, at least 6 times per day    2. Pre-op bowel cleanse as ordered- clear liquids the day before     3. Avoid post-op constipation as discussed    4. Follow-up Cystograms scheduled 1 week after surgery at [facility] , start antibiotic the day before the cystogram, take the day off and complete the day after, call the office if you did not get a prescription for antibiotic at discharge. After cystogram, come directly to office for post-op appt    5. Call with questions or concerns    6. No vacuum pump at this time   No follow-ups on file. Prescriptions Ordered:  No orders of the defined types were placed in this encounter. Orders Placed:  No orders of the defined types were placed in this encounter. BJORN Tena CNP    Reviewed and agree Agree with the ROS entered by the MA.

## 2021-05-05 LAB
CULTURE: NO GROWTH
EKG ATRIAL RATE: 84 BPM
EKG P AXIS: 66 DEGREES
EKG P-R INTERVAL: 194 MS
EKG Q-T INTERVAL: 378 MS
EKG QRS DURATION: 88 MS
EKG QTC CALCULATION (BAZETT): 446 MS
EKG R AXIS: 16 DEGREES
EKG T AXIS: 54 DEGREES
EKG VENTRICULAR RATE: 84 BPM
Lab: NORMAL
SPECIMEN DESCRIPTION: NORMAL

## 2021-05-05 PROCEDURE — 93010 ELECTROCARDIOGRAM REPORT: CPT | Performed by: INTERNAL MEDICINE

## 2021-05-10 ENCOUNTER — TELEPHONE (OUTPATIENT)
Dept: FAMILY MEDICINE CLINIC | Age: 71
End: 2021-05-10

## 2021-05-10 ENCOUNTER — TELEPHONE (OUTPATIENT)
Dept: UROLOGY | Age: 71
End: 2021-05-10

## 2021-05-10 DIAGNOSIS — Z79.4 TYPE 2 DIABETES MELLITUS WITH HYPERGLYCEMIA, WITH LONG-TERM CURRENT USE OF INSULIN (HCC): ICD-10-CM

## 2021-05-10 DIAGNOSIS — E11.65 TYPE 2 DIABETES MELLITUS WITH HYPERGLYCEMIA, WITH LONG-TERM CURRENT USE OF INSULIN (HCC): ICD-10-CM

## 2021-05-10 NOTE — TELEPHONE ENCOUNTER
Pt called requesting incontinence supplies, pull ups briefs for after surgery. My insurance will cover it.

## 2021-05-10 NOTE — TELEPHONE ENCOUNTER
Be White is calling to request a refill on the following medication(s):    Medication Request:  Requested Prescriptions     Pending Prescriptions Disp Refills    blood glucose test strips (ASCENSIA AUTODISC VI;ONE TOUCH ULTRA TEST VI) strip 200 strip 1     Sig: Use with associated glucose meter. True Metrix.   Testing four times daily       Last Visit Date (If Applicable):  0/28/4170    Next Visit Date:    5/24/2021

## 2021-05-11 RX ORDER — DIAPER,BRIEF,ADULT, DISPOSABLE
EACH MISCELLANEOUS
Qty: 180 ML | Refills: 0 | Status: SHIPPED | OUTPATIENT
Start: 2021-05-11 | End: 2021-06-22

## 2021-05-15 ENCOUNTER — HOSPITAL ENCOUNTER (OUTPATIENT)
Dept: LAB | Age: 71
Setting detail: SPECIMEN
Discharge: HOME | End: 2021-05-15
Payer: COMMERCIAL

## 2021-05-15 DIAGNOSIS — Z20.822 COVID-19 RULED OUT BY LABORATORY TESTING: Primary | ICD-10-CM

## 2021-05-15 PROCEDURE — U0003 INFECTIOUS AGENT DETECTION BY NUCLEIC ACID (DNA OR RNA); SEVERE ACUTE RESPIRATORY SYNDROME CORONAVIRUS 2 (SARS-COV-2) (CORONAVIRUS DISEASE [COVID-19]), AMPLIFIED PROBE TECHNIQUE, MAKING USE OF HIGH THROUGHPUT TECHNOLOGIES AS DESCRIBED BY CMS-2020-01-R: HCPCS

## 2021-05-15 PROCEDURE — U0005 INFEC AGEN DETEC AMPLI PROBE: HCPCS

## 2021-05-16 LAB
SARS-COV-2: NORMAL
SARS-COV-2: NOT DETECTED
SOURCE: NORMAL

## 2021-05-17 ENCOUNTER — TELEPHONE (OUTPATIENT)
Dept: PRIMARY CARE CLINIC | Age: 71
End: 2021-05-17

## 2021-05-19 ENCOUNTER — ANESTHESIA (OUTPATIENT)
Dept: OPERATING ROOM | Age: 71
End: 2021-05-19
Payer: COMMERCIAL

## 2021-05-19 ENCOUNTER — HOSPITAL ENCOUNTER (OUTPATIENT)
Age: 71
Setting detail: OBSERVATION
Discharge: HOME | End: 2021-05-21
Attending: UROLOGY | Admitting: UROLOGY
Payer: COMMERCIAL

## 2021-05-19 ENCOUNTER — ANESTHESIA EVENT (OUTPATIENT)
Dept: OPERATING ROOM | Age: 71
End: 2021-05-19
Payer: COMMERCIAL

## 2021-05-19 VITALS — DIASTOLIC BLOOD PRESSURE: 74 MMHG | OXYGEN SATURATION: 98 % | TEMPERATURE: 94.7 F | SYSTOLIC BLOOD PRESSURE: 149 MMHG

## 2021-05-19 DIAGNOSIS — G89.18 POST-OPERATIVE PAIN: Primary | ICD-10-CM

## 2021-05-19 PROBLEM — C61 PROSTATE CANCER (HCC): Status: ACTIVE | Noted: 2021-05-19

## 2021-05-19 LAB
ANION GAP SERPL CALCULATED.3IONS-SCNC: 11 MMOL/L (ref 9–17)
ANION GAP SERPL CALCULATED.3IONS-SCNC: 8 MMOL/L (ref 9–17)
BUN BLDV-MCNC: 36 MG/DL (ref 8–23)
BUN BLDV-MCNC: 38 MG/DL (ref 8–23)
BUN/CREAT BLD: ABNORMAL (ref 9–20)
BUN/CREAT BLD: ABNORMAL (ref 9–20)
CALCIUM SERPL-MCNC: 7.8 MG/DL (ref 8.6–10.4)
CALCIUM SERPL-MCNC: 8.3 MG/DL (ref 8.6–10.4)
CHLORIDE BLD-SCNC: 109 MMOL/L (ref 98–107)
CHLORIDE BLD-SCNC: 112 MMOL/L (ref 98–107)
CO2: 20 MMOL/L (ref 20–31)
CO2: 21 MMOL/L (ref 20–31)
CREAT SERPL-MCNC: 2.71 MG/DL (ref 0.7–1.2)
CREAT SERPL-MCNC: 2.75 MG/DL (ref 0.7–1.2)
GFR AFRICAN AMERICAN: 28 ML/MIN
GFR AFRICAN AMERICAN: 28 ML/MIN
GFR NON-AFRICAN AMERICAN: 23 ML/MIN
GFR NON-AFRICAN AMERICAN: 23 ML/MIN
GFR SERPL CREATININE-BSD FRML MDRD: ABNORMAL ML/MIN/{1.73_M2}
GLUCOSE BLD-MCNC: 122 MG/DL (ref 70–99)
GLUCOSE BLD-MCNC: 159 MG/DL (ref 75–110)
GLUCOSE BLD-MCNC: 170 MG/DL (ref 70–99)
GLUCOSE BLD-MCNC: 197 MG/DL (ref 75–110)
GLUCOSE BLD-MCNC: 202 MG/DL (ref 75–110)
GLUCOSE BLD-MCNC: 95 MG/DL (ref 75–110)
HCT VFR BLD CALC: 30.3 % (ref 40.7–50.3)
HEMOGLOBIN: 9.9 G/DL (ref 13–17)
MCH RBC QN AUTO: 30.2 PG (ref 25.2–33.5)
MCHC RBC AUTO-ENTMCNC: 32.7 G/DL (ref 28.4–34.8)
MCV RBC AUTO: 92.4 FL (ref 82.6–102.9)
NRBC AUTOMATED: 0 PER 100 WBC
PDW BLD-RTO: 12.1 % (ref 11.8–14.4)
PLATELET # BLD: 118 K/UL (ref 138–453)
PMV BLD AUTO: 11.7 FL (ref 8.1–13.5)
POC POTASSIUM: 2.4 MMOL/L (ref 3.5–4.5)
POTASSIUM SERPL-SCNC: 4.2 MMOL/L (ref 3.7–5.3)
POTASSIUM SERPL-SCNC: 4.2 MMOL/L (ref 3.7–5.3)
RBC # BLD: 3.28 M/UL (ref 4.21–5.77)
SODIUM BLD-SCNC: 140 MMOL/L (ref 135–144)
SODIUM BLD-SCNC: 141 MMOL/L (ref 135–144)
WBC # BLD: 8.1 K/UL (ref 3.5–11.3)

## 2021-05-19 PROCEDURE — 2580000003 HC RX 258: Performed by: STUDENT IN AN ORGANIZED HEALTH CARE EDUCATION/TRAINING PROGRAM

## 2021-05-19 PROCEDURE — 2580000003 HC RX 258

## 2021-05-19 PROCEDURE — 7100000001 HC PACU RECOVERY - ADDTL 15 MIN: Performed by: UROLOGY

## 2021-05-19 PROCEDURE — 85027 COMPLETE CBC AUTOMATED: CPT

## 2021-05-19 PROCEDURE — 87086 URINE CULTURE/COLONY COUNT: CPT

## 2021-05-19 PROCEDURE — G0378 HOSPITAL OBSERVATION PER HR: HCPCS

## 2021-05-19 PROCEDURE — 88309 TISSUE EXAM BY PATHOLOGIST: CPT

## 2021-05-19 PROCEDURE — 2500000003 HC RX 250 WO HCPCS

## 2021-05-19 PROCEDURE — 6360000002 HC RX W HCPCS: Performed by: PHYSICIAN ASSISTANT

## 2021-05-19 PROCEDURE — 2580000003 HC RX 258: Performed by: ANESTHESIOLOGY

## 2021-05-19 PROCEDURE — 82330 ASSAY OF CALCIUM: CPT

## 2021-05-19 PROCEDURE — 2500000003 HC RX 250 WO HCPCS: Performed by: UROLOGY

## 2021-05-19 PROCEDURE — 82374 ASSAY BLOOD CARBON DIOXIDE: CPT

## 2021-05-19 PROCEDURE — 84132 ASSAY OF SERUM POTASSIUM: CPT

## 2021-05-19 PROCEDURE — 84520 ASSAY OF UREA NITROGEN: CPT

## 2021-05-19 PROCEDURE — 6360000002 HC RX W HCPCS

## 2021-05-19 PROCEDURE — 82565 ASSAY OF CREATININE: CPT

## 2021-05-19 PROCEDURE — 3700000001 HC ADD 15 MINUTES (ANESTHESIA): Performed by: UROLOGY

## 2021-05-19 PROCEDURE — C1769 GUIDE WIRE: HCPCS | Performed by: UROLOGY

## 2021-05-19 PROCEDURE — 83605 ASSAY OF LACTIC ACID: CPT

## 2021-05-19 PROCEDURE — 2709999900 HC NON-CHARGEABLE SUPPLY: Performed by: UROLOGY

## 2021-05-19 PROCEDURE — 6360000002 HC RX W HCPCS: Performed by: STUDENT IN AN ORGANIZED HEALTH CARE EDUCATION/TRAINING PROGRAM

## 2021-05-19 PROCEDURE — 82947 ASSAY GLUCOSE BLOOD QUANT: CPT

## 2021-05-19 PROCEDURE — 3600000019 HC SURGERY ROBOT ADDTL 15MIN: Performed by: UROLOGY

## 2021-05-19 PROCEDURE — 6370000000 HC RX 637 (ALT 250 FOR IP): Performed by: STUDENT IN AN ORGANIZED HEALTH CARE EDUCATION/TRAINING PROGRAM

## 2021-05-19 PROCEDURE — 80048 BASIC METABOLIC PNL TOTAL CA: CPT

## 2021-05-19 PROCEDURE — S2900 ROBOTIC SURGICAL SYSTEM: HCPCS | Performed by: UROLOGY

## 2021-05-19 PROCEDURE — 3700000000 HC ANESTHESIA ATTENDED CARE: Performed by: UROLOGY

## 2021-05-19 PROCEDURE — 2580000003 HC RX 258: Performed by: UROLOGY

## 2021-05-19 PROCEDURE — 3600000009 HC SURGERY ROBOT BASE: Performed by: UROLOGY

## 2021-05-19 PROCEDURE — 7100000000 HC PACU RECOVERY - FIRST 15 MIN: Performed by: UROLOGY

## 2021-05-19 PROCEDURE — 6360000002 HC RX W HCPCS: Performed by: ANESTHESIOLOGY

## 2021-05-19 PROCEDURE — 82435 ASSAY OF BLOOD CHLORIDE: CPT

## 2021-05-19 PROCEDURE — 85014 HEMATOCRIT: CPT

## 2021-05-19 RX ORDER — MAGNESIUM HYDROXIDE 1200 MG/15ML
LIQUID ORAL CONTINUOUS PRN
Status: COMPLETED | OUTPATIENT
Start: 2021-05-19 | End: 2021-05-19

## 2021-05-19 RX ORDER — BUPIVACAINE HYDROCHLORIDE AND EPINEPHRINE 5; 5 MG/ML; UG/ML
INJECTION, SOLUTION PERINEURAL PRN
Status: DISCONTINUED | OUTPATIENT
Start: 2021-05-19 | End: 2021-05-19 | Stop reason: HOSPADM

## 2021-05-19 RX ORDER — SODIUM CHLORIDE 9 MG/ML
INJECTION, SOLUTION INTRAVENOUS CONTINUOUS
Status: DISCONTINUED | OUTPATIENT
Start: 2021-05-19 | End: 2021-05-21 | Stop reason: HOSPADM

## 2021-05-19 RX ORDER — ROCURONIUM BROMIDE 10 MG/ML
INJECTION, SOLUTION INTRAVENOUS PRN
Status: DISCONTINUED | OUTPATIENT
Start: 2021-05-19 | End: 2021-05-19 | Stop reason: SDUPTHER

## 2021-05-19 RX ORDER — SODIUM CHLORIDE 9 MG/ML
25 INJECTION, SOLUTION INTRAVENOUS PRN
Status: DISCONTINUED | OUTPATIENT
Start: 2021-05-19 | End: 2021-05-21 | Stop reason: HOSPADM

## 2021-05-19 RX ORDER — MORPHINE SULFATE 2 MG/ML
2 INJECTION, SOLUTION INTRAMUSCULAR; INTRAVENOUS EVERY 4 HOURS PRN
Status: DISCONTINUED | OUTPATIENT
Start: 2021-05-19 | End: 2021-05-21 | Stop reason: HOSPADM

## 2021-05-19 RX ORDER — INSULIN GLARGINE 100 [IU]/ML
15 INJECTION, SOLUTION SUBCUTANEOUS NIGHTLY
Status: DISCONTINUED | OUTPATIENT
Start: 2021-05-19 | End: 2021-05-21 | Stop reason: HOSPADM

## 2021-05-19 RX ORDER — NICOTINE POLACRILEX 4 MG
15 LOZENGE BUCCAL PRN
Status: DISCONTINUED | OUTPATIENT
Start: 2021-05-19 | End: 2021-05-21 | Stop reason: HOSPADM

## 2021-05-19 RX ORDER — ONDANSETRON 2 MG/ML
4 INJECTION INTRAMUSCULAR; INTRAVENOUS EVERY 6 HOURS PRN
Status: DISCONTINUED | OUTPATIENT
Start: 2021-05-19 | End: 2021-05-21 | Stop reason: HOSPADM

## 2021-05-19 RX ORDER — MIDAZOLAM HYDROCHLORIDE 2 MG/2ML
1 INJECTION, SOLUTION INTRAMUSCULAR; INTRAVENOUS EVERY 10 MIN PRN
Status: DISCONTINUED | OUTPATIENT
Start: 2021-05-19 | End: 2021-05-19

## 2021-05-19 RX ORDER — NIFEDIPINE 90 MG/1
90 TABLET, EXTENDED RELEASE ORAL DAILY
Status: DISCONTINUED | OUTPATIENT
Start: 2021-05-19 | End: 2021-05-21 | Stop reason: HOSPADM

## 2021-05-19 RX ORDER — METOPROLOL SUCCINATE 50 MG/1
50 TABLET, EXTENDED RELEASE ORAL DAILY
Status: DISCONTINUED | OUTPATIENT
Start: 2021-05-20 | End: 2021-05-21 | Stop reason: HOSPADM

## 2021-05-19 RX ORDER — MAGNESIUM HYDROXIDE 1200 MG/15ML
LIQUID ORAL PRN
Status: DISCONTINUED | OUTPATIENT
Start: 2021-05-19 | End: 2021-05-19 | Stop reason: HOSPADM

## 2021-05-19 RX ORDER — EVEROLIMUS TABLETS 0.5 MG/1
2.25 TABLET ORAL 2 TIMES DAILY
Status: DISCONTINUED | OUTPATIENT
Start: 2021-05-19 | End: 2021-05-19

## 2021-05-19 RX ORDER — SODIUM CHLORIDE 9 MG/ML
INJECTION, SOLUTION INTRAVENOUS CONTINUOUS PRN
Status: DISCONTINUED | OUTPATIENT
Start: 2021-05-19 | End: 2021-05-19 | Stop reason: SDUPTHER

## 2021-05-19 RX ORDER — MEPERIDINE HYDROCHLORIDE 50 MG/ML
12.5 INJECTION INTRAMUSCULAR; INTRAVENOUS; SUBCUTANEOUS EVERY 5 MIN PRN
Status: DISCONTINUED | OUTPATIENT
Start: 2021-05-19 | End: 2021-05-19 | Stop reason: HOSPADM

## 2021-05-19 RX ORDER — FENTANYL CITRATE 50 UG/ML
25 INJECTION, SOLUTION INTRAMUSCULAR; INTRAVENOUS EVERY 5 MIN PRN
Status: DISCONTINUED | OUTPATIENT
Start: 2021-05-19 | End: 2021-05-19

## 2021-05-19 RX ORDER — SODIUM CHLORIDE 0.9 % (FLUSH) 0.9 %
5-40 SYRINGE (ML) INJECTION PRN
Status: DISCONTINUED | OUTPATIENT
Start: 2021-05-19 | End: 2021-05-21 | Stop reason: HOSPADM

## 2021-05-19 RX ORDER — FENTANYL CITRATE 50 UG/ML
INJECTION, SOLUTION INTRAMUSCULAR; INTRAVENOUS PRN
Status: DISCONTINUED | OUTPATIENT
Start: 2021-05-19 | End: 2021-05-19 | Stop reason: SDUPTHER

## 2021-05-19 RX ORDER — DEXTROSE MONOHYDRATE 25 G/50ML
12.5 INJECTION, SOLUTION INTRAVENOUS PRN
Status: DISCONTINUED | OUTPATIENT
Start: 2021-05-19 | End: 2021-05-21 | Stop reason: HOSPADM

## 2021-05-19 RX ORDER — LIDOCAINE HYDROCHLORIDE 10 MG/ML
INJECTION, SOLUTION EPIDURAL; INFILTRATION; INTRACAUDAL; PERINEURAL PRN
Status: DISCONTINUED | OUTPATIENT
Start: 2021-05-19 | End: 2021-05-19 | Stop reason: SDUPTHER

## 2021-05-19 RX ORDER — ACETAMINOPHEN 325 MG/1
650 TABLET ORAL EVERY 6 HOURS SCHEDULED
Status: DISCONTINUED | OUTPATIENT
Start: 2021-05-19 | End: 2021-05-21 | Stop reason: HOSPADM

## 2021-05-19 RX ORDER — FAMOTIDINE 20 MG/1
20 TABLET, FILM COATED ORAL DAILY
Status: DISCONTINUED | OUTPATIENT
Start: 2021-05-20 | End: 2021-05-21 | Stop reason: HOSPADM

## 2021-05-19 RX ORDER — SODIUM CHLORIDE 0.9 % (FLUSH) 0.9 %
5-40 SYRINGE (ML) INJECTION PRN
Status: DISCONTINUED | OUTPATIENT
Start: 2021-05-19 | End: 2021-05-19

## 2021-05-19 RX ORDER — SODIUM CHLORIDE, SODIUM LACTATE, POTASSIUM CHLORIDE, CALCIUM CHLORIDE 600; 310; 30; 20 MG/100ML; MG/100ML; MG/100ML; MG/100ML
1000 INJECTION, SOLUTION INTRAVENOUS CONTINUOUS
Status: DISCONTINUED | OUTPATIENT
Start: 2021-05-19 | End: 2021-05-19

## 2021-05-19 RX ORDER — OXYCODONE HYDROCHLORIDE 5 MG/1
5 TABLET ORAL EVERY 4 HOURS PRN
Status: DISCONTINUED | OUTPATIENT
Start: 2021-05-19 | End: 2021-05-21 | Stop reason: HOSPADM

## 2021-05-19 RX ORDER — ATORVASTATIN CALCIUM 40 MG/1
40 TABLET, FILM COATED ORAL DAILY
Status: DISCONTINUED | OUTPATIENT
Start: 2021-05-20 | End: 2021-05-21 | Stop reason: HOSPADM

## 2021-05-19 RX ORDER — SODIUM CHLORIDE, SODIUM LACTATE, POTASSIUM CHLORIDE, CALCIUM CHLORIDE 600; 310; 30; 20 MG/100ML; MG/100ML; MG/100ML; MG/100ML
INJECTION, SOLUTION INTRAVENOUS CONTINUOUS
Status: DISCONTINUED | OUTPATIENT
Start: 2021-05-19 | End: 2021-05-19

## 2021-05-19 RX ORDER — EVEROLIMUS 1 MG/1
1 TABLET ORAL 2 TIMES DAILY
Status: DISCONTINUED | OUTPATIENT
Start: 2021-05-19 | End: 2021-05-20

## 2021-05-19 RX ORDER — EVEROLIMUS TABLETS 0.5 MG/1
1 TABLET ORAL 2 TIMES DAILY
Status: DISCONTINUED | OUTPATIENT
Start: 2021-05-19 | End: 2021-05-19

## 2021-05-19 RX ORDER — FENTANYL CITRATE 50 UG/ML
25 INJECTION, SOLUTION INTRAMUSCULAR; INTRAVENOUS EVERY 5 MIN PRN
Status: COMPLETED | OUTPATIENT
Start: 2021-05-19 | End: 2021-05-19

## 2021-05-19 RX ORDER — PROPOFOL 10 MG/ML
INJECTION, EMULSION INTRAVENOUS PRN
Status: DISCONTINUED | OUTPATIENT
Start: 2021-05-19 | End: 2021-05-19 | Stop reason: SDUPTHER

## 2021-05-19 RX ORDER — EVEROLIMUS TABLETS 0.75 MG/1
2.25 TABLET ORAL 2 TIMES DAILY
Status: DISCONTINUED | OUTPATIENT
Start: 2021-05-19 | End: 2021-05-19

## 2021-05-19 RX ORDER — OXYCODONE HYDROCHLORIDE 5 MG/1
10 TABLET ORAL EVERY 4 HOURS PRN
Status: DISCONTINUED | OUTPATIENT
Start: 2021-05-19 | End: 2021-05-21 | Stop reason: HOSPADM

## 2021-05-19 RX ORDER — HEPARIN SODIUM 5000 [USP'U]/ML
5000 INJECTION, SOLUTION INTRAVENOUS; SUBCUTANEOUS ONCE
Status: COMPLETED | OUTPATIENT
Start: 2021-05-19 | End: 2021-05-19

## 2021-05-19 RX ORDER — GABAPENTIN 100 MG/1
100 CAPSULE ORAL 2 TIMES DAILY
Status: DISCONTINUED | OUTPATIENT
Start: 2021-05-19 | End: 2021-05-21 | Stop reason: HOSPADM

## 2021-05-19 RX ORDER — GLYCOPYRROLATE 1 MG/5 ML
SYRINGE (ML) INTRAVENOUS PRN
Status: DISCONTINUED | OUTPATIENT
Start: 2021-05-19 | End: 2021-05-19 | Stop reason: SDUPTHER

## 2021-05-19 RX ORDER — SODIUM CHLORIDE 0.9 % (FLUSH) 0.9 %
5-40 SYRINGE (ML) INJECTION EVERY 12 HOURS SCHEDULED
Status: DISCONTINUED | OUTPATIENT
Start: 2021-05-19 | End: 2021-05-19

## 2021-05-19 RX ORDER — NEOSTIGMINE METHYLSULFATE 5 MG/5 ML
SYRINGE (ML) INTRAVENOUS PRN
Status: DISCONTINUED | OUTPATIENT
Start: 2021-05-19 | End: 2021-05-19 | Stop reason: SDUPTHER

## 2021-05-19 RX ORDER — DEXTROSE MONOHYDRATE 50 MG/ML
100 INJECTION, SOLUTION INTRAVENOUS PRN
Status: DISCONTINUED | OUTPATIENT
Start: 2021-05-19 | End: 2021-05-21 | Stop reason: HOSPADM

## 2021-05-19 RX ORDER — FENTANYL CITRATE 50 UG/ML
50 INJECTION, SOLUTION INTRAMUSCULAR; INTRAVENOUS EVERY 5 MIN PRN
Status: COMPLETED | OUTPATIENT
Start: 2021-05-19 | End: 2021-05-19

## 2021-05-19 RX ORDER — SODIUM CHLORIDE 0.9 % (FLUSH) 0.9 %
5-40 SYRINGE (ML) INJECTION EVERY 12 HOURS SCHEDULED
Status: DISCONTINUED | OUTPATIENT
Start: 2021-05-19 | End: 2021-05-21 | Stop reason: HOSPADM

## 2021-05-19 RX ORDER — ONDANSETRON 2 MG/ML
INJECTION INTRAMUSCULAR; INTRAVENOUS PRN
Status: DISCONTINUED | OUTPATIENT
Start: 2021-05-19 | End: 2021-05-19 | Stop reason: SDUPTHER

## 2021-05-19 RX ORDER — SODIUM CHLORIDE 9 MG/ML
25 INJECTION, SOLUTION INTRAVENOUS PRN
Status: DISCONTINUED | OUTPATIENT
Start: 2021-05-19 | End: 2021-05-19

## 2021-05-19 RX ORDER — SODIUM CHLORIDE 9 MG/ML
INJECTION, SOLUTION INTRAVENOUS PRN
Status: DISCONTINUED | OUTPATIENT
Start: 2021-05-19 | End: 2021-05-21 | Stop reason: HOSPADM

## 2021-05-19 RX ORDER — LIDOCAINE HYDROCHLORIDE 10 MG/ML
1 INJECTION, SOLUTION EPIDURAL; INFILTRATION; INTRACAUDAL; PERINEURAL
Status: DISCONTINUED | OUTPATIENT
Start: 2021-05-19 | End: 2021-05-19

## 2021-05-19 RX ADMIN — Medication 0.6 MG: at 10:41

## 2021-05-19 RX ADMIN — CEFAZOLIN SODIUM 2000 MG: 1 INJECTION, POWDER, FOR SOLUTION INTRAMUSCULAR; INTRAVENOUS at 18:24

## 2021-05-19 RX ADMIN — ACETAMINOPHEN 650 MG: 325 TABLET ORAL at 20:29

## 2021-05-19 RX ADMIN — MORPHINE SULFATE 2 MG: 2 INJECTION, SOLUTION INTRAMUSCULAR; INTRAVENOUS at 16:27

## 2021-05-19 RX ADMIN — TACROLIMUS 6 MG: 1 CAPSULE ORAL at 21:03

## 2021-05-19 RX ADMIN — PROPOFOL 120 MG: 10 INJECTION, EMULSION INTRAVENOUS at 08:17

## 2021-05-19 RX ADMIN — NIFEDIPINE 90 MG: 90 TABLET, FILM COATED, EXTENDED RELEASE ORAL at 18:24

## 2021-05-19 RX ADMIN — FENTANYL CITRATE 25 MCG: 50 INJECTION, SOLUTION INTRAMUSCULAR; INTRAVENOUS at 13:22

## 2021-05-19 RX ADMIN — FENTANYL CITRATE 50 MCG: 50 INJECTION, SOLUTION INTRAMUSCULAR; INTRAVENOUS at 11:48

## 2021-05-19 RX ADMIN — SODIUM CHLORIDE: 9 INJECTION, SOLUTION INTRAVENOUS at 08:26

## 2021-05-19 RX ADMIN — FENTANYL CITRATE 25 MCG: 50 INJECTION, SOLUTION INTRAMUSCULAR; INTRAVENOUS at 13:02

## 2021-05-19 RX ADMIN — MORPHINE SULFATE 2 MG: 2 INJECTION, SOLUTION INTRAMUSCULAR; INTRAVENOUS at 22:18

## 2021-05-19 RX ADMIN — INSULIN GLARGINE 15 UNITS: 100 INJECTION, SOLUTION SUBCUTANEOUS at 21:04

## 2021-05-19 RX ADMIN — FENTANYL CITRATE 50 MCG: 50 INJECTION, SOLUTION INTRAMUSCULAR; INTRAVENOUS at 11:27

## 2021-05-19 RX ADMIN — SODIUM CHLORIDE: 9 INJECTION, SOLUTION INTRAVENOUS at 18:23

## 2021-05-19 RX ADMIN — GABAPENTIN 100 MG: 100 CAPSULE ORAL at 21:03

## 2021-05-19 RX ADMIN — FENTANYL CITRATE 50 MCG: 50 INJECTION, SOLUTION INTRAMUSCULAR; INTRAVENOUS at 11:00

## 2021-05-19 RX ADMIN — HYOSCYAMINE SULFATE 125 MCG: 0.12 TABLET ORAL; SUBLINGUAL at 20:29

## 2021-05-19 RX ADMIN — SODIUM CHLORIDE: 9 INJECTION, SOLUTION INTRAVENOUS at 08:13

## 2021-05-19 RX ADMIN — FENTANYL CITRATE 50 MCG: 50 INJECTION, SOLUTION INTRAMUSCULAR; INTRAVENOUS at 11:37

## 2021-05-19 RX ADMIN — ONDANSETRON 4 MG: 2 INJECTION INTRAMUSCULAR; INTRAVENOUS at 22:18

## 2021-05-19 RX ADMIN — OXYCODONE HYDROCHLORIDE 10 MG: 5 TABLET ORAL at 13:44

## 2021-05-19 RX ADMIN — SODIUM CHLORIDE, PRESERVATIVE FREE 10 ML: 5 INJECTION INTRAVENOUS at 21:05

## 2021-05-19 RX ADMIN — FENTANYL CITRATE 100 MCG: 50 INJECTION, SOLUTION INTRAMUSCULAR; INTRAVENOUS at 08:17

## 2021-05-19 RX ADMIN — ROCURONIUM BROMIDE 50 MG: 10 INJECTION INTRAVENOUS at 08:17

## 2021-05-19 RX ADMIN — EVEROLIMUS 1 MG: 1 TABLET ORAL at 18:24

## 2021-05-19 RX ADMIN — CEFAZOLIN SODIUM 2000 MG: 1 INJECTION, POWDER, FOR SOLUTION INTRAMUSCULAR; INTRAVENOUS at 08:52

## 2021-05-19 RX ADMIN — INSULIN LISPRO 2 UNITS: 100 INJECTION, SOLUTION INTRAVENOUS; SUBCUTANEOUS at 21:05

## 2021-05-19 RX ADMIN — HEPARIN SODIUM 5000 UNITS: 5000 INJECTION INTRAVENOUS; SUBCUTANEOUS at 07:49

## 2021-05-19 RX ADMIN — LIDOCAINE HYDROCHLORIDE 50 MG: 10 INJECTION, SOLUTION EPIDURAL; INFILTRATION; INTRACAUDAL; PERINEURAL at 08:17

## 2021-05-19 RX ADMIN — ROCURONIUM BROMIDE 10 MG: 10 INJECTION INTRAVENOUS at 10:13

## 2021-05-19 RX ADMIN — SODIUM CHLORIDE, POTASSIUM CHLORIDE, SODIUM LACTATE AND CALCIUM CHLORIDE: 600; 310; 30; 20 INJECTION, SOLUTION INTRAVENOUS at 07:50

## 2021-05-19 RX ADMIN — ROCURONIUM BROMIDE 20 MG: 10 INJECTION INTRAVENOUS at 09:08

## 2021-05-19 RX ADMIN — ONDANSETRON 4 MG: 2 INJECTION INTRAMUSCULAR; INTRAVENOUS at 10:20

## 2021-05-19 RX ADMIN — SODIUM CHLORIDE: 9 INJECTION, SOLUTION INTRAVENOUS at 10:34

## 2021-05-19 RX ADMIN — FENTANYL CITRATE 50 MCG: 50 INJECTION, SOLUTION INTRAMUSCULAR; INTRAVENOUS at 11:21

## 2021-05-19 RX ADMIN — FENTANYL CITRATE 50 MCG: 50 INJECTION, SOLUTION INTRAMUSCULAR; INTRAVENOUS at 09:14

## 2021-05-19 RX ADMIN — Medication 3 MG: at 10:41

## 2021-05-19 RX ADMIN — OXYCODONE HYDROCHLORIDE 10 MG: 5 TABLET ORAL at 18:24

## 2021-05-19 RX ADMIN — FENTANYL CITRATE 25 MCG: 50 INJECTION, SOLUTION INTRAMUSCULAR; INTRAVENOUS at 11:56

## 2021-05-19 RX ADMIN — FENTANYL CITRATE 25 MCG: 50 INJECTION, SOLUTION INTRAMUSCULAR; INTRAVENOUS at 12:42

## 2021-05-19 RX ADMIN — EVEROLIMUS TABLETS 2.25 MG: 0.75 TABLET ORAL at 18:23

## 2021-05-19 RX ADMIN — MAGNESIUM GLUCONATE 500 MG ORAL TABLET 400 MG: 500 TABLET ORAL at 21:03

## 2021-05-19 RX ADMIN — SUGAMMADEX 200 MG: 100 INJECTION, SOLUTION INTRAVENOUS at 10:57

## 2021-05-19 RX ADMIN — ACETAMINOPHEN 650 MG: 325 TABLET ORAL at 13:44

## 2021-05-19 ASSESSMENT — PULMONARY FUNCTION TESTS
PIF_VALUE: 24
PIF_VALUE: 6
PIF_VALUE: 24
PIF_VALUE: 25
PIF_VALUE: 25
PIF_VALUE: 4
PIF_VALUE: 25
PIF_VALUE: 15
PIF_VALUE: 4
PIF_VALUE: 8
PIF_VALUE: 24
PIF_VALUE: 18
PIF_VALUE: 5
PIF_VALUE: 17
PIF_VALUE: 22
PIF_VALUE: 5
PIF_VALUE: 14
PIF_VALUE: 25
PIF_VALUE: 26
PIF_VALUE: 15
PIF_VALUE: 6
PIF_VALUE: 25
PIF_VALUE: 18
PIF_VALUE: 0
PIF_VALUE: 4
PIF_VALUE: 26
PIF_VALUE: 24
PIF_VALUE: 18
PIF_VALUE: 1
PIF_VALUE: 26
PIF_VALUE: 25
PIF_VALUE: 15
PIF_VALUE: 19
PIF_VALUE: 25
PIF_VALUE: 0
PIF_VALUE: 0
PIF_VALUE: 26
PIF_VALUE: 19
PIF_VALUE: 25
PIF_VALUE: 15
PIF_VALUE: 15
PIF_VALUE: 25
PIF_VALUE: 15
PIF_VALUE: 26
PIF_VALUE: 14
PIF_VALUE: 14
PIF_VALUE: 24
PIF_VALUE: 14
PIF_VALUE: 5
PIF_VALUE: 26
PIF_VALUE: 24
PIF_VALUE: 19
PIF_VALUE: 5
PIF_VALUE: 26
PIF_VALUE: 14
PIF_VALUE: 25
PIF_VALUE: 24
PIF_VALUE: 27
PIF_VALUE: 26
PIF_VALUE: 15
PIF_VALUE: 26
PIF_VALUE: 17
PIF_VALUE: 24
PIF_VALUE: 26
PIF_VALUE: 26
PIF_VALUE: 4
PIF_VALUE: 26
PIF_VALUE: 23
PIF_VALUE: 15
PIF_VALUE: 0
PIF_VALUE: 19
PIF_VALUE: 25
PIF_VALUE: 19
PIF_VALUE: 14
PIF_VALUE: 26
PIF_VALUE: 14
PIF_VALUE: 14
PIF_VALUE: 25
PIF_VALUE: 0
PIF_VALUE: 15
PIF_VALUE: 25
PIF_VALUE: 14
PIF_VALUE: 25
PIF_VALUE: 14
PIF_VALUE: -9
PIF_VALUE: 15
PIF_VALUE: 25
PIF_VALUE: 27

## 2021-05-19 ASSESSMENT — PAIN SCALES - GENERAL
PAINLEVEL_OUTOF10: 8
PAINLEVEL_OUTOF10: 5

## 2021-05-19 NOTE — ANESTHESIA POSTPROCEDURE EVALUATION
Department of Anesthesiology  Postprocedure Note    Patient: Rhea Berry  MRN: [id number]  [place]: 1950  Date of evaluation: 5/19/2021  Time:  12:55 PM     Procedure Summary     Date: 05/19/21 Room / Location: [facility] 16 / [address] [facility] [place]    Anesthesia Start: 0813 Anesthesia Stop: 5521    Procedures:       CYSTOSCOPY (Right )      XI ROBOTIC LAPAROSCOPIC PROSTATECTOMY DISSECTION (N/A ) Diagnosis: (PROSTATE CANCER)    Surgeons: Norris Samayoa MD Responsible Provider: Carie Vergara MD    Anesthesia Type: general ASA Status: 3          Anesthesia Type: general    Renata Phase I: Renata Score: 9    Renata Phase II:      Last vitals: Reviewed and per EMR flowsheets.        Anesthesia Post Evaluation    Patient location during evaluation: PACU  Patient participation: complete - patient participated  Level of consciousness: awake and alert  Pain score: 3  Airway patency: patent  Nausea & Vomiting: no vomiting and no nausea  Complications: no  Cardiovascular status: hemodynamically stable  Respiratory status: acceptable  Hydration status: stable

## 2021-05-19 NOTE — H&P
History and Physical    Patient:  Radha Thomas  MRN: [id number]  YOB: 1950    CHIEF COMPLAINT:  Prostate cancer    HISTORY OF PRESENT ILLNESS:   The patient is a 80 y.o. male who presents with history of Zoraida 7 prostate cancer. He has a history of a Right renal transplant. He is on tacrolimus and evirolimus. Patient's old records, notes and chart reviewed and summarized above.      Past Medical History:    Past Medical History:   Diagnosis Date    Anemia     [facility]on[facility])     [facility]    CKD (chronic kidney disease) stage 4, GFR 15-29 ml/min ([facility]     COVID-19 03/2020    Elevated PSA, between 10 and less than 20 ng/ml     [facility] stage kidney disease [facility]     Hemodialysis access, fistula mature ([facility]     right arm    Hemodialysis patient ([facility]     patient had Kidney transplant-no longer on dialysis    Hepatitis C without hepatic coma     Hx of inguinal hernia repair     Hyperlipidemia     Hypertension     Irregular heart rate     wearing holter monitor 1/29/21    Leukopenia 01/29/2021    Palpitations     [facility]     prostate    Ren[facility] recipient [facility]2017    [facility]    Thrombocytopenia ([facility] 01/29/2021    Type 2 diabetes mellitus ([facility]     Under care of team 05/04/2021    [facility]-last visit april 2021    Under care of team 05/04/2021    cardiology-[facility]-aleida wade-last visit onc 2020    Wellness examination 05/04/2021    pcp[facility]-last visit march 2021       Past Surgical History:    Past Surgical History:   Procedure Laterality Date    CARDIAC CATHETERIZATION      Patient states normal    COLONOSCOPY N/A 10/30/2019    COLONOSCOPY WITH BIOPSY performed by Ana Alvarado MD at [facility] Right 11/2015    [facility]    left inguinal    KIDNEY TRANSPLANT  2017    right    LIVER BIOPSY      PROSTATE BIOPSY N/A 2/12/2021    PROSTATE BIOPSY WITH ULTRASOUND TO OR WITH TECH performed by Martina Hernandez MD at [facility]  2/12/2021    US PROSTATE NEEDLE PUNCH 2/12/2021 [facility] ULTRASOUND     Medications Prior to Admission:    Prior to Admission medications    Medication Sig Start Date End Date Taking? Authorizing Provider   ZORTRESS 1 MG TABS Take 1 mg by mouth 2 times daily 4/6/21  Yes Historical Provider, MD   atorvastatin (LIPITOR) 40 MG tablet TAKE 1 TABLET BY MOUTH DAILY 5/3/21  Yes Colton Farrar DO   famotidine (PEPCID) 20 MG tablet TAKE 1 TABLET BY MOUTH TWICE A DAY 4/5/21  Yes Colton Farrar DO   gabapentin (NEURONTIN) 100 MG capsule TAKE 1 CAPSULE BY MOUTH TWICE A DAY 4/5/21 5/19/21 Yes Colton Farrar DO   tamsulosin (FLOMAX) 0.4 MG capsule TAKE 1 CAPSULE BY MOUTH DAILY 4/2/21  Yes Colton Farrar DO   insulin glargine (BASAGLAR KWIKPEN) 100 UNIT/ML injection pen Inject 18 Units into the skin nightly  Patient taking differently: Inject 15 Units into the skin nightly Per Dr. Salima Pedroza 2/18/21  Yes Colton Farrar DO   NIFEdipine (PROCARDIA XL) 90 MG extended release tablet TAKE 1 TABLET BY MOUTH DAILY 11/18/20  Yes Praneeth Horton DO   metoprolol succinate (TOPROL XL) 50 MG extended release tablet Take 1 tablet by mouth daily 9/23/20  Yes Colton Farrar DO   Everolimus 0.75 MG TABS 2 times daily 3 tabs 2 times daily 6/19/20  Yes Historical Provider, MD   magnesium oxide (MAG-OX) 400 (241.3 Mg) MG TABS tablet TAKE 1 TABLET BY MOUTH TWICE A DAY 4/14/20  Yes Colton Farrar DO   insulin lispro (HUMALOG KWIKPEN) 100 UNIT/ML pen Per Sliding scale.  Max 28 units per day 9/16/19  Yes Maryam López, DO   tacrolimus (PROGRAF) 1 MG capsule Take 6 mg by mouth 2 times daily    Yes Historical Provider, MD   Incontinence Supply Disposable (DISPOSABLE BRIEF MEDIUM) MISC 6 briefs per day/180 per month 3 refills 5/11/21   BJORN Cabrera - CNP   blood glucose test strips (ASCENSIA AUTODISC VI;ONE Running Out of Food in the Last Year:    [address] in the Last Year:    Transportation Needs:     Lack of Transportation (Medical):  Lack of Transportation (Non-Medical):    Physical Activity:     Days of Exercise per Week:     Minutes of Exercise per Session:    Stress:     Feeling of Stress :    Social Connections:     Frequency of Communication with Friends and Family:     Frequency of Social Gatherings with Friends and Family:     Attends Yazidism Services:     Active Member of Clubs or Organizations:     Attends Club or Organization Meetings:     Marital Status:    Intimate Partner Violence:     Fear of Current or Ex-Partner:     Emotionally Abused:     Physically Abused:     Sexually Abused:        Family History:    Family History   Problem Relation Age of Onset    Cancer Mother         Lymphoma    Lung Cancer Father     Colon Cancer Brother        REVIEW OF SYSTEMS:  Constitutional: negative  Eyes: negative  Respiratory: negative  Cardiovascular: negative  Gastrointestinal: negative  Genitourinary: see HPI  Musculoskeletal: negative  Skin: negative   Neurological: negative  Hematological/Lymphatic: negative  Psychological: negative    Physical Exam:      No data found. Constitutional: Patient in no acute distress; Neuro: alert and oriented to person place and time. Psych: Mood and affect normal.  Skin: Normal  Lungs: Respiratory effort normal, CTA  Cardiovascular:  Normal peripheral pulses  Abdomen: Soft, non-tender, non-distended  Bladder non-tender and not distended. LABS:   No results for input(s): WBC, HGB, HCT, MCV, PLT in the last 72 hours. No results for input(s): NA, K, CL, CO2, PHOS, BUN, CREATININE in the last 72 hours.     Invalid input(s): CA  Lab Results   Component Value Date    PSA 12.22 (H) 08/14/2020    PSA 3.2 02/16/2018    PSA 2.4 05/02/2017           Urinalysis: No results for input(s): COLORU, PHUR, LABCAST, WBCUA, RBCUA, MUCUS, TRICHOMONAS,

## 2021-05-19 NOTE — OP NOTE
Dr. Miguel Angel Santiago MD    [address] [facility]. [place]  Date: 05/19/21    Patient:  Be White  MRN: [id number]  YOB: 1950    Surgeon: Miguel Angel Santiago MD  Assistant: Nemo Clifford MD    Pre-Operative Diagnosis: Adenocarcinoma of the prostate. Post-Operative Diagnosis: Adenocarcinoma of the prostate. Procedure:   1. Robotic assisted laparoscopic radical prostatectomy    Intraoperative Findings:  - Identification of the transplant ureter and kidney on right side  - PLND was deferred due to anatomy and possible need for renal transplant in future, in conjunction with his intermediate risk disease    Anesthesia: General  Complications: None  Blood Loss:  Minimal  Fluids: Cystalloids  Drains: None  Specimens:   ID Type Source Tests Collected by Time Destination   1 : URINE FOR CULTURE Urine Urine, straight catheter CULTURE, URINE Eryn Garcia MD 5/19/2021 7701    A : PROSTATE AND SEMINAL VESSICLES Tissue Prostate SURGICAL PATHOLOGY Eryn Garcia MD 5/19/2021 5149        Indication:  80year old male with history of renal transplant to the Right iliac fossa and Zoraida 7 prostate cancer. He presents today for treatment. Narrative of the Procedure:   After informed consent was obtained in the preoperative area, the patient was taken back to the operating room. He was transferred to the operating table in the supine position. EPC cuffs were placed. The machine was turned on. Anesthesia was induced and antibiotics were started. His arms were tucked. All areas were appropriately padded. He had received heparin subcu preoperatively. He was sterilely prepped and draped in a standard fashion. A sterile catheter was placed on the field. At this time we made a small roughly a 9.9HF supraumbilical incision. Using a Veress needle we obtained pneumoperitoneum. Once the abdomen insufflated, an 8 mm robotic port was placed in the midline using a blunt trocar. The camera was introduced.  The abdomen was inspected. There was no evidence of traumatic Veress needle insertion. We then placed the remainder of our ports. This included 3 further 8 mm robotic ports; a 5 mm assistant port; and a 12 mm assistant port. The instruments were placed under visual guidance after the robot was docked. Once this was completed, we began to drop the bladder using the medial umbilical ligaments as our landmark medially. Once the bladder was dropped. The endopelvic fascia was cleared off. The superficial DVC was ligated using bipolar energy. Periprostatic fat was removed and sent for pathology. The endopelvic fascia was then incised. The pelvic floor muscles were cleaned off of the sidewall of the prostate. A standard dissection was completed. The DVC was ligated using a 0 V-Lock suture and this was secured to the pubic bone. Attention was turned to the bladder neck. A median lobe was no present. The prostato-vesicle junction was located. Using monopolar energy the bladder neck dissection was completed. The anterior Denonvilliers fascia was incised. The bilateral seminal vesicles and vasa were seen. These were carefully dissected out and then elevated anteriorly. The posterior Denonvilliers fascia was incised and the rectum was gently swept off the posterior aspect of the prostate. When this was completed, the pedicles were ligated using Weck hem-o-lock clips. Attention was then turned to the anterior dissection. Using monopolar energy the DVC was freed. The urethra was transected using cold energy. The prostate was freed. Attention was turned to the bladder neck. Re-construction was required. This was completed with 3-0 V loc. The anastomosis was then completed by first placing a 3-0 Monocryl Acosta stitch. The running anastomosis was completed with a 3-0 Monocryl quill  suture. The anastomosis was tested using 120 cc of sterile irrigation. JACKSON drain was not placed.     A new catheter was also placed. The robot was undocked. The specimen was then extracted through the midline incision. The abdominal fascia was reapproximated in the midline using 0 Vicryl sutures in Figure-of-8 manner. All wounds were irrigated. The skin was closed using 4-0 Monocryl suture. Dr. Nahomy Reynolds was present and scrubbed for all critical portions of the procedure. Patient was awakened, extubated, and discharged back to the PACU in good and stable condition. Follow-Up: Patient will undergo our routine postoperative prostatectomy clinical pathway. Any deviation from a normal postoperative course will be reflected the discharge summary.     Zoraida Purdy MD  Electronically signed on 5/19/2021 at 10:38 AM

## 2021-05-19 NOTE — ANESTHESIA PRE PROCEDURE
Department of Anesthesiology  Preprocedure Note       Name:  Pineda Sevilla   Age:  80 y.o.  :  1950                                          MRN:  [id number]         Date:  2021      Surgeon: Gary Cotter):  Emerald Gallo MD    Procedure: Procedure(s):  CYSTOSCOPY URETERAL STENT INSERTION  XI ROBOTIC LAPAROSCOPIC PROSTATECTOMY WITH STANDARD PELVIC LYMPHNODE DISSECTION AND LEFT PELVIC LYMPHNODE DISSECTION    Medications prior to admission:   Prior to Admission medications    Medication Sig Start Date End Date Taking? Authorizing Provider   Incontinence Supply Disposable (DISPOSABLE BRIEF MEDIUM) MISC 6 briefs per day/180 per month 3 refills 21   Cony Wilder, APRN - CNP   blood glucose test strips (ASCENSIA AUTODISC VI;ONE TOUCH ULTRA TEST VI) strip Use with associated glucose meter. True Metrix. Testing four times daily 5/10/21   Lurene Alert, DO   ZORTRESS 1 MG TABS Take 1 mg by mouth 2 times daily 21   Historical Provider, MD   atorvastatin (LIPITOR) 40 MG tablet TAKE 1 TABLET BY MOUTH DAILY 5/3/21   Lurene Alert, DO   famotidine (PEPCID) 20 MG tablet TAKE 1 TABLET BY MOUTH TWICE A DAY 21   Lurene Alert, DO   gabapentin (NEURONTIN) 100 MG capsule TAKE 1 CAPSULE BY MOUTH TWICE A DAY 21  Lurene Alert, DO   tamsulosin (FLOMAX) 0.4 MG capsule TAKE 1 CAPSULE BY MOUTH DAILY 21   Lurene Alert, DO   insulin glargine [facility] 100 UNIT/ML injection pen Inject 18 Units into the skin nightly  Patient taking differently: Inject 15 Units into the skin nightly Per Dr. Cabello [place] 21   Lurene Alert, DO   Insulin Pen Needle (TECHLITE PEN NEEDLES) 32G X 6 MM MISC USE AS DIRECTED.  WITH INSULIN PEN UP TO 5 TIMES PER DAY 20 Alert, DO   FREESTYLE LITE strip USE TO TEST TWICE A DAY AS DIRECTED 20 Alert, DO   Blood Glucose Monitoring Suppl (ONE TOUCH ULTRA 2) w/Device KIT Check blood sugar four times daily 20   Lurene Alert, DO NIFEdipine (PROCARDIA XL) 90 MG extended release tablet TAKE 1 TABLET BY MOUTH DAILY 11/18/20   Praneeth Horton, DO   metoprolol succinate (TOPROL XL) 50 MG extended release tablet Take 1 tablet by mouth daily 9/23/20   Merrianne Lobe, DO   Everolimus 0.75 MG TABS 2 times daily 3 tabs 2 times daily 6/19/20   Historical Provider, MD   Lancets MISC 1 each by Does not apply route daily 5/19/20   Merrianne Lobe, DO   Lancet Device MISC 1 Device by Does not apply route once for 1 dose 5/19/20 5/19/20  Merrianne Lobe, DO   Insulin Pen Needle (ULTICARE MINI PEN NEEDLES) 31G X 6 MM MISC PATIENT IS TESTING UP TO 5 TIMES DAILY 4/14/20   Merrianne Lobe, DO   magnesium oxide (MAG-OX) 400 (241.3 Mg) MG TABS tablet TAKE 1 TABLET BY MOUTH TWICE A DAY 4/14/20   Merrianne Lobe, DO   insulin lispro (HUMALOG KWIKPEN) 100 UNIT/ML pen Per Sliding scale. Max 28 units per day 9/16/19   Catalina López, DO   tacrolimus (PROGRAF) 1 MG capsule Take 6 mg by mouth 2 times daily     Historical Provider, MD       Current medications:    No current facility-administered medications for this encounter. Allergies:     Allergies   Allergen Reactions    Amino Acids Nausea Only    Lisinopril Nausea Only       Problem List:    Patient Active Problem List   Diagnosis Code    Controlled type 2 diabetes mellitus with stage 2 chronic kidney disease, with long-term current use of insulin (HCC) E11.22, N18.2, Z79.4    Elevated serum cholesterol E78.9    Renal insufficiency syndrome N28.9    Chronic hepatitis C (HCC) B18.2    Coronary arteriosclerosis I25.10    End-stage renal disease (HCC) N18.6    Benign essential HTN I10    General patient noncompliance Z91.19    Encounter for colonoscopy due to history of adenomatous colonic polyps Z12.11, Z86.010    Family hx of colon cancer requiring screening colonoscopy Z80.0    History of renal transplant Z94.0    Hyperlipidemia E78.5    Tobacco user Z72.0    Chronic diarrhea K52.9    PROSTATE BIOPSY N/A 2021    PROSTATE BIOPSY WITH ULTRASOUND TO OR WITH TECH performed by Charles Roque MD at [facility]  2021    US PROSTATE NEEDLE PUNCH 2021 [facility] ULTRASOUND       Social History:    Social History     Tobacco Use    Smoking status: Former Smoker     Packs/day: 0.50     Years: 50.00     Pack years: 25.00     Types: Cigarettes     Quit date: 2013     Years since quittin.1    Smokeless tobacco: Never Used    Tobacco comment: No smoking in at least 3 years   Substance Use Topics    Alcohol use: No     Comment: No ETOH in 3 years                                Counseling given: Not Answered  Comment: No smoking in at least 3 years      Vital Signs (Current): There were no vitals filed for this visit.                                            BP Readings from Last 3 Encounters:   21 (!) 159/74   21 (!) 141/71   21 (!) 160/74       NPO Status: Time of last liquid consumption: 2200                        Time of last solid consumption: 2100                                                      BMI:   Wt Readings from Last 3 Encounters:   21 185 lb (83.9 kg)   21 185 lb (83.9 kg)   21 178 lb (80.7 kg)     There is no height or weight on file to calculate BMI.    CBC:   Lab Results   Component Value Date    WBC 3.2 2021    RBC 3.32 2021    RBC 3.71 2018    HGB 10.0 2021    HCT 30.7 2021    MCV 92.5 2021    RDW 12.5 2021     2021       CMP:   Lab Results   Component Value Date     2021    K 5.0 2021     2021    CO2 23 2021    BUN 39 2021    CREATININE 2.81 2021    GFRAA 27 2021    LABGLOM 22 2021    GLUCOSE 171 2021    GLUCOSE 207 2018    PROT 7.2 2020    PROT 6.4 2018    CALCIUM 9.0 2021    BILITOT 0.46 2020    ALKPHOS 111 2020    AST 30 2020    ALT 35

## 2021-05-19 NOTE — PROGRESS NOTES
Donn Flanagan, Abraham Hedrick, Zen Agrawal Brain, & Melanie  Urology Progress Note    Subjective: Serge Schmidt is a 80 y.o. male. No acute events overnight. No chest pain, shortness of breath, nausea, vomiting, fevers, chills  Ambulated  No flatus  Pain well controlled      Patient Vitals for the past 24 hrs:   BP Temp Temp src Pulse Resp SpO2 Height   05/19/21 1845 (!) 176/89 97.6 °F (36.4 °C) Temporal 89 16 100 % --   05/19/21 1615 (!) 158/88 97.2 °F (36.2 °C) Temporal 79 14 94 % --   05/19/21 1500 (!) 158/74 97.1 °F (36.2 °C) -- 80 19 90 % --   05/19/21 1400 -- 96.6 °F (35.9 °C) -- -- -- -- --   05/19/21 1322 -- -- -- 73 19 93 % --   05/19/21 1302 -- -- -- 71 21 93 % --   05/19/21 1300 (!) 151/70 -- -- 71 14 94 % --   05/19/21 1200 (!) 158/72 -- -- 69 13 91 % --   05/19/21 1130 (!) 162/72 -- -- 73 15 95 % --   05/19/21 1115 (!) 155/80 -- -- 74 15 100 % --   05/19/21 1101 (!) 133/17 97.7 °F (36.5 °C) Temporal 73 15 100 % --   05/19/21 0732 (!) 154/75 97 °F (36.1 °C) Oral 73 16 100 % 6' 2\" (1.88 m)       Intake/Output Summary (Last 24 hours) at 5/19/2021 1852  Last data filed at 5/19/2021 1600  Gross per 24 hour   Intake 1391 ml   Output 880 ml   Net 511 ml       Recent Labs     05/19/21  1152   WBC 8.1   HGB 9.9*   HCT 30.3*   MCV 92.4   *     Recent Labs     05/19/21  0931 05/19/21  1152    140   K 4.2 4.2   * 109*   CO2 21 20   BUN 36* 38*   CREATININE 2.75* 2.71*       No results for input(s): COLORU, PHUR, LABCAST, WBCUA, RBCUA, MUCUS, TRICHOMONAS, YEAST, BACTERIA, CLARITYU, SPECGRAV, LEUKOCYTESUR, UROBILINOGEN, BILIRUBINUR, BLOODU in the last 72 hours. Invalid input(s): NITRATE, GLUCOSEUKETONESUAMORPHOUS    Physical Exam:     NAD, AOx3  RRR.  Peripheral pulses palpable  Respirations nonlabored, symmetric chest rise bilaterally  Abdomen: soft, appropriately tender, nondistended  Lower extremities: No edema of calf tenderness bilaterally  Incisions clean dry and intact   Kingsley draining yellow urine well    Interval Imaging Findings:    Imaging was independently reviewed and checked with radiologist report      Impression:      Bruce North is a 80 y.o. male admitted with      Problem List  - POD 1 s/p RALP  - ESRD s/p Right Renal Transplant    Plan:     - Regular Diet  - Immunosuppression: Evirolimus 1mg BID, Tacrolimus 6mg BID  - Maintain Kingsley catheter  - IVF: NS at 125 cc/hr.  Wean off  - Encourage ambulation and IS use  - Social work for home care      Ev De La Cruz MD  Urology Resident, PGY5  6:52 PM 5/19/2021

## 2021-05-20 LAB
ABO/RH: NORMAL
ANION GAP SERPL CALCULATED.3IONS-SCNC: 13 MMOL/L (ref 9–17)
ANTIBODY SCREEN: NEGATIVE
ARM BAND NUMBER: NORMAL
BLD PROD TYP BPU: NORMAL
BLD PROD TYP BPU: NORMAL
BUN BLDV-MCNC: 35 MG/DL (ref 8–23)
BUN/CREAT BLD: ABNORMAL (ref 9–20)
CALCIUM SERPL-MCNC: 8.1 MG/DL (ref 8.6–10.4)
CHLORIDE BLD-SCNC: 110 MMOL/L (ref 98–107)
CO2: 17 MMOL/L (ref 20–31)
CREAT SERPL-MCNC: 2.5 MG/DL (ref 0.7–1.2)
CROSSMATCH RESULT: NORMAL
CROSSMATCH RESULT: NORMAL
CULTURE: NO GROWTH
DISPENSE STATUS BLOOD BANK: NORMAL
DISPENSE STATUS BLOOD BANK: NORMAL
EXPIRATION DATE: NORMAL
GFR AFRICAN AMERICAN: 31 ML/MIN
GFR NON-AFRICAN AMERICAN: 26 ML/MIN
GFR NON-AFRICAN AMERICAN: 32 ML/MIN
GFR SERPL CREATININE-BSD FRML MDRD: 39 ML/MIN
GFR SERPL CREATININE-BSD FRML MDRD: ABNORMAL ML/MIN/{1.73_M2}
GLUCOSE BLD-MCNC: 104 MG/DL (ref 75–110)
GLUCOSE BLD-MCNC: 105 MG/DL (ref 75–110)
GLUCOSE BLD-MCNC: 108 MG/DL (ref 75–110)
GLUCOSE BLD-MCNC: 109 MG/DL (ref 75–110)
GLUCOSE BLD-MCNC: 111 MG/DL (ref 74–100)
GLUCOSE BLD-MCNC: 122 MG/DL (ref 75–110)
GLUCOSE BLD-MCNC: 131 MG/DL (ref 70–99)
GLUCOSE BLD-MCNC: 149 MG/DL (ref 75–110)
HCT VFR BLD CALC: 30.8 % (ref 40.7–50.3)
HEMOGLOBIN: 9.7 G/DL (ref 13–17)
Lab: NORMAL
MCH RBC QN AUTO: 29.7 PG (ref 25.2–33.5)
MCHC RBC AUTO-ENTMCNC: 31.5 G/DL (ref 28.4–34.8)
MCV RBC AUTO: 94.2 FL (ref 82.6–102.9)
NRBC AUTOMATED: 0 PER 100 WBC
PDW BLD-RTO: 12.3 % (ref 11.8–14.4)
PLATELET # BLD: ABNORMAL K/UL (ref 138–453)
PLATELET, FLUORESCENCE: 123 K/UL (ref 138–453)
PLATELET, IMMATURE FRACTION: 6 % (ref 1.1–10.3)
PMV BLD AUTO: ABNORMAL FL (ref 8.1–13.5)
POC BUN: 26 MG/DL (ref 6–20)
POC CHLORIDE: 121 MMOL/L (ref 98–107)
POC CREATININE: 2.05 MG/DL (ref 0.51–1.19)
POC HEMATOCRIT: 12 % (ref 41–53)
POC HEMOGLOBIN: 4.2 G/DL (ref 13.5–17.5)
POC IONIZED CALCIUM: 0.88 MMOL/L (ref 1.15–1.33)
POC LACTIC ACID: 0.64 MMOL/L (ref 0.56–1.39)
POC POTASSIUM: 2.4 MMOL/L (ref 3.5–4.5)
POC TCO2: 15 MMOL/L (ref 20–31)
POTASSIUM SERPL-SCNC: 4.6 MMOL/L (ref 3.7–5.3)
RBC # BLD: 3.27 M/UL (ref 4.21–5.77)
SODIUM BLD-SCNC: 140 MMOL/L (ref 135–144)
SPECIMEN DESCRIPTION: NORMAL
TRANSFUSION STATUS: NORMAL
TRANSFUSION STATUS: NORMAL
UNIT DIVISION: 0
UNIT DIVISION: 0
UNIT NUMBER: NORMAL
UNIT NUMBER: NORMAL
WBC # BLD: 5.2 K/UL (ref 3.5–11.3)

## 2021-05-20 PROCEDURE — 6370000000 HC RX 637 (ALT 250 FOR IP): Performed by: STUDENT IN AN ORGANIZED HEALTH CARE EDUCATION/TRAINING PROGRAM

## 2021-05-20 PROCEDURE — 2580000003 HC RX 258: Performed by: STUDENT IN AN ORGANIZED HEALTH CARE EDUCATION/TRAINING PROGRAM

## 2021-05-20 PROCEDURE — 82947 ASSAY GLUCOSE BLOOD QUANT: CPT

## 2021-05-20 PROCEDURE — 6360000002 HC RX W HCPCS: Performed by: STUDENT IN AN ORGANIZED HEALTH CARE EDUCATION/TRAINING PROGRAM

## 2021-05-20 PROCEDURE — 96374 THER/PROPH/DIAG INJ IV PUSH: CPT

## 2021-05-20 PROCEDURE — 2500000003 HC RX 250 WO HCPCS: Performed by: STUDENT IN AN ORGANIZED HEALTH CARE EDUCATION/TRAINING PROGRAM

## 2021-05-20 PROCEDURE — 85027 COMPLETE CBC AUTOMATED: CPT

## 2021-05-20 PROCEDURE — 80048 BASIC METABOLIC PNL TOTAL CA: CPT

## 2021-05-20 PROCEDURE — 36415 COLL VENOUS BLD VENIPUNCTURE: CPT

## 2021-05-20 PROCEDURE — G0378 HOSPITAL OBSERVATION PER HR: HCPCS

## 2021-05-20 PROCEDURE — 85055 RETICULATED PLATELET ASSAY: CPT

## 2021-05-20 RX ORDER — CIPROFLOXACIN 500 MG/1
500 TABLET, FILM COATED ORAL 2 TIMES DAILY
Qty: 6 TABLET | Refills: 0 | Status: SHIPPED | OUTPATIENT
Start: 2021-05-20 | End: 2021-05-23

## 2021-05-20 RX ORDER — EVEROLIMUS TABLETS 0.75 MG/1
1.5 TABLET ORAL 2 TIMES DAILY
Status: DISCONTINUED | OUTPATIENT
Start: 2021-05-20 | End: 2021-05-21 | Stop reason: HOSPADM

## 2021-05-20 RX ORDER — OXYCODONE HYDROCHLORIDE AND ACETAMINOPHEN 5; 325 MG/1; MG/1
1 TABLET ORAL EVERY 6 HOURS PRN
Qty: 20 TABLET | Refills: 0 | Status: SHIPPED | OUTPATIENT
Start: 2021-05-20 | End: 2021-05-25

## 2021-05-20 RX ORDER — POLYETHYLENE GLYCOL 3350 17 G/17G
17 POWDER, FOR SOLUTION ORAL DAILY PRN
Qty: 510 G | Refills: 0 | Status: SHIPPED | OUTPATIENT
Start: 2021-05-20 | End: 2021-06-19

## 2021-05-20 RX ORDER — METOPROLOL TARTRATE 5 MG/5ML
5 INJECTION INTRAVENOUS ONCE
Status: COMPLETED | OUTPATIENT
Start: 2021-05-20 | End: 2021-05-20

## 2021-05-20 RX ADMIN — ACETAMINOPHEN 650 MG: 325 TABLET ORAL at 12:07

## 2021-05-20 RX ADMIN — EVEROLIMUS TABLETS 1.5 MG: 0.75 TABLET ORAL at 22:20

## 2021-05-20 RX ADMIN — ACETAMINOPHEN 650 MG: 325 TABLET ORAL at 00:30

## 2021-05-20 RX ADMIN — CEFAZOLIN SODIUM 2000 MG: 1 INJECTION, POWDER, FOR SOLUTION INTRAMUSCULAR; INTRAVENOUS at 08:52

## 2021-05-20 RX ADMIN — OXYCODONE HYDROCHLORIDE 10 MG: 5 TABLET ORAL at 18:49

## 2021-05-20 RX ADMIN — CEFAZOLIN SODIUM 2000 MG: 1 INJECTION, POWDER, FOR SOLUTION INTRAMUSCULAR; INTRAVENOUS at 00:33

## 2021-05-20 RX ADMIN — ACETAMINOPHEN 650 MG: 325 TABLET ORAL at 17:48

## 2021-05-20 RX ADMIN — METOPROLOL SUCCINATE 50 MG: 50 TABLET, FILM COATED, EXTENDED RELEASE ORAL at 08:56

## 2021-05-20 RX ADMIN — TACROLIMUS 6 MG: 1 CAPSULE ORAL at 08:53

## 2021-05-20 RX ADMIN — GABAPENTIN 100 MG: 100 CAPSULE ORAL at 20:54

## 2021-05-20 RX ADMIN — TACROLIMUS 6 MG: 1 CAPSULE ORAL at 20:54

## 2021-05-20 RX ADMIN — MAGNESIUM GLUCONATE 500 MG ORAL TABLET 400 MG: 500 TABLET ORAL at 20:54

## 2021-05-20 RX ADMIN — METOPROLOL TARTRATE 5 MG: 1 INJECTION, SOLUTION INTRAVENOUS at 00:30

## 2021-05-20 RX ADMIN — SODIUM CHLORIDE, PRESERVATIVE FREE 10 ML: 5 INJECTION INTRAVENOUS at 09:00

## 2021-05-20 RX ADMIN — EVEROLIMUS 1 MG: 1 TABLET ORAL at 08:53

## 2021-05-20 RX ADMIN — CEFAZOLIN SODIUM 2000 MG: 1 INJECTION, POWDER, FOR SOLUTION INTRAMUSCULAR; INTRAVENOUS at 17:01

## 2021-05-20 RX ADMIN — FAMOTIDINE 20 MG: 20 TABLET, FILM COATED ORAL at 08:56

## 2021-05-20 RX ADMIN — OXYCODONE HYDROCHLORIDE 10 MG: 5 TABLET ORAL at 23:04

## 2021-05-20 RX ADMIN — GABAPENTIN 100 MG: 100 CAPSULE ORAL at 08:52

## 2021-05-20 RX ADMIN — ACETAMINOPHEN 650 MG: 325 TABLET ORAL at 23:04

## 2021-05-20 RX ADMIN — SODIUM CHLORIDE: 9 INJECTION, SOLUTION INTRAVENOUS at 05:36

## 2021-05-20 RX ADMIN — NIFEDIPINE 90 MG: 90 TABLET, FILM COATED, EXTENDED RELEASE ORAL at 08:52

## 2021-05-20 RX ADMIN — MAGNESIUM GLUCONATE 500 MG ORAL TABLET 400 MG: 500 TABLET ORAL at 08:52

## 2021-05-20 RX ADMIN — ACETAMINOPHEN 650 MG: 325 TABLET ORAL at 05:36

## 2021-05-20 RX ADMIN — DESMOPRESSIN ACETATE 40 MG: 0.2 TABLET ORAL at 08:52

## 2021-05-20 ASSESSMENT — PAIN DESCRIPTION - PROGRESSION
CLINICAL_PROGRESSION: GRADUALLY IMPROVING

## 2021-05-20 ASSESSMENT — PAIN SCALES - GENERAL
PAINLEVEL_OUTOF10: 9
PAINLEVEL_OUTOF10: 5
PAINLEVEL_OUTOF10: 7
PAINLEVEL_OUTOF10: 4
PAINLEVEL_OUTOF10: 5
PAINLEVEL_OUTOF10: 5

## 2021-05-20 ASSESSMENT — PAIN DESCRIPTION - PAIN TYPE: TYPE: SURGICAL PAIN

## 2021-05-20 NOTE — CARE COORDINATION
Case Management Initial Discharge Plan  Kenyetta Sierra,             Met with:patient to discuss discharge plans. Information verified: address, contacts, phone number, , insurance Yes    Emergency Contact/Next of Kin name & number: Samreen Golden (sister) [phone]    PCP: Amanda Sparrow DO  Date of last visit: 1 month    Insurance Provider: Nando Mejia    Discharge Planning    Living Arrangements:  Alone   Support Systems:  Family Members    Home has 1 stories  0 stairs to climb to get into front door, stairs to climb to reach second floor  Location of bedroom/bathroom in home     Patient able to perform ADL's:Independent    Current Services (outpatient & in home)   DME equipment:   DME provider:     Receiving oral anticoagulation therapy? No    If indicated:   Physician managing anticoagulation treatment:   Where does patient obtain lab work for ATC treatment? Potential Assistance Needed:  N/A    Patient agreeable to home care: Yes  Freedom of choice provided:  yes    Prior SNF/Rehab Placement and Facility:   Agreeable to SNF/Rehab: No  [place] of choice provided: n/a     Evaluation: no    Expected Discharge date:  21    Patient expects to be discharged to:  home  Follow Up Appointment: Best Day/ Time:      Transportation provider: family  Transportation arrangements needed for discharge: No    Readmission Risk              Risk of Unplanned Readmission:  0             Does patient have a readmission risk score greater than 14?: No  If yes, follow-up appointment must be made within 7 days of discharge. Goals of Care: comfort      Discharge Plan: home with home care. List provided for freedom of choice    Electronically signed by Alexander Arias RN on 21 at 10:26 AM EDT    [phone] pt would like [facility] for home care. Notified Melly Bailey.  They are able to accept

## 2021-05-20 NOTE — PLAN OF CARE
Problem: Pain:  Goal: Pain level will decrease  Description: Pain level will decrease  5/20/2021 1411 by Anna Mckeon RN  Outcome: Ongoing  5/20/2021 0318 by Pradeep Chan RN  Outcome: Ongoing  Goal: Control of acute pain  Description: Control of acute pain  5/20/2021 1411 by Anna Mckeon RN  Outcome: Ongoing  5/20/2021 0318 by Pradeep Chan RN  Outcome: Ongoing  Goal: Control of chronic pain  Description: Control of chronic pain  5/20/2021 1411 by Anna Mckeon RN  Outcome: Ongoing  5/20/2021 0318 by Pradeep Chan RN  Outcome: Ongoing     Problem: Falls - Risk of:  Goal: Will remain free from falls  Description: Will remain free from falls  5/20/2021 1411 by Anna Mckeon RN  Outcome: Ongoing  5/20/2021 0318 by Pradeep Chan RN  Outcome: Ongoing  Goal: Absence of physical injury  Description: Absence of physical injury  5/20/2021 1411 by Anna Mckeon RN  Outcome: Ongoing  5/20/2021 0318 by Pradeep Chan RN  Outcome: Ongoing     Problem: Urinary Elimination:  Goal: Signs and symptoms of infection will decrease  Description: Signs and symptoms of infection will decrease  5/20/2021 1411 by Anna Mckeon RN  Outcome: Ongoing  5/20/2021 0318 by Pradeep Chan RN  Outcome: Ongoing  Goal: Complications related to the disease process, condition or treatment will be avoided or minimized  Description: Complications related to the disease process, condition or treatment will be avoided or minimized  5/20/2021 1411 by Anna Mckeon RN  Outcome: Ongoing  5/20/2021 0318 by Pradeep Chan RN  Outcome: Ongoing

## 2021-05-20 NOTE — DISCHARGE INSTR - COC
US PROSTATE NEEDLE PUNCH  2/12/2021    US PROSTATE NEEDLE PUNCH 2/12/2021 [facility] ULTRASOUND       Immunization History:   Immunization History   Administered Date(s) Administered    Influenza Virus Vaccine 10/04/2010, 09/29/2017    Influenza, High Dose (Fluzone 65 yrs and older) 08/28/2016, 10/25/2017, 11/05/2018, 05/22/2019    Influenza, Eveleen Blakes, IM, PF (6 mo and older Fluzone, Flulaval, Fluarix, and 3 yrs and older Afluria) 09/29/2017    Influenza, Triv, inactivated, subunit, adjuvanted, IM (Fluad 65 yrs and older) 11/22/2019    Pneumococcal Conjugate 13-valent ([id number]) 08/28/2016    Pneumococcal Polysaccharide ([id number]) 10/25/2017, 05/22/2019    Tdap (Boostrix, Adacel) 07/22/2019       Active Problems:  Patient Active Problem List   Diagnosis Code    Controlled type 2 diabetes mellitus with stage 2 chronic kidney disease, with long-term current use of insulin (HCC) E11.22, N18.2, Z79.4    Elevated serum cholesterol E78.9    Renal insufficiency syndrome N28.9    Chronic hepatitis C (HCC) B18.2    Coronary arteriosclerosis I25.10    End-stage renal disease (HCC) N18.6    Benign essential HTN I10    General patient noncompliance Z91.19    Encounter for colonoscopy due to history of adenomatous colonic polyps Z12.11, Z86.010    Family hx of colon cancer requiring screening colonoscopy Z80.0    History of renal transplant Z94.0    Hyperlipidemia E78.5    Tobacco user Z72.0    Chronic diarrhea K52.9    Immunosuppressed status (HCC) D84.9    Elevated C-reactive protein (CRP) R79.82    Elevated LDH R74.02    Normochromic normocytic anemia D64.9    Moderate protein-calorie malnutrition (HCC) E44.0    Abdominal pain R10.9    Acute kidney injury (HCC) N17.9    Dizziness R42    Fever R50.9    Left ventricular hypertrophy I51.7    Lung mass R91.8    Pneumonia due to infectious organism J18.9    Elevated PSA, between 10 and less than 20 ng/ml R97.20    Prostate cancer ([facility] [address] C61 Isolation/Infection:   Isolation            No Isolation          Patient Infection Status       Infection Onset Added Last Indicated Last Indicated By Review Planned Expiration Resolved Resolved By    None active    Resolved    COVID-19 Rule Out 05/15/21 05/15/21 05/15/21 COVID-19 (Ordered)   21 Rule-Out Test Resulted    COVID-19 Rule Out 21 COVID-19 (Ordered)   21 Rule-Out Test Resulted    COVID-19 20 COVID-19   20     COVID-19 Rule Out 20 COVID-19 (Ordered)   20 Rule-Out Test Resulted            Nurse Assessment:  Last Vital Signs: BP (!) 141/76   Pulse 77   Temp 97.8 °F (36.6 °C) (Oral)   Resp 16   Ht 6' 2\" (1.88 m)   SpO2 96%   BMI 23.75 kg/m²     Last documented pain score (0-10 scale): Pain Level: 5  Last Weight:   Wt Readings from Last 1 Encounters:   21 185 lb (83.9 kg)     Mental Status:  oriented and alert    IV Access:  - None    Nursing Mobility/ADLs:  Walking   Independent  Transfer  Independent  Bathing  Assisted  Dressing  Independent  [address]  Independent  Med Delivery   whole    Wound Care Documentation and Therapy:        Elimination:  Continence:   · Bowel: Yes  · Bladder: christensen  Urinary Catheter: Insertion Date: 21   Colostomy/Ileostomy/Ileal Conduit: No       Date of Last BM: ***    Intake/Output Summary (Last 24 hours) at 2021 1335  Last data filed at 2021 0539  Gross per 24 hour   Intake 591 ml   Output 2250 ml   Net -1659 ml     I/O last 3 completed shifts:   In: 3949 [I.V.:1391]  Out: 2580 [Urine:2550; Blood:30]    Safety Concerns:     None    Impairments/Disabilities:      None    Nutrition Therapy:  Current Nutrition Therapy:   - Oral Diet:  Carb Control 4 carbs/meal (1800kcals/day)    Routes of Feeding: Oral  Liquids: No Restrictions  Daily Fluid Restriction: no  Last Modified Barium Swallow with Video (Video Swallowing Test): not done    Treatments at the Time of Hospital Discharge:   Respiratory Treatments:   Oxygen Therapy:  is not on home oxygen therapy. Ventilator:    - No ventilator support    Rehab Therapies: Physical Therapy and Occupational Therapy  Weight Bearing Status/Restrictions: No weight bearing restirctions  Other Medical Equipment (for information only, NOT a DME order):  n/a  Other Treatments: skilled nursing    Patient's personal belongings (please select all that are sent with patient):  None    RN SIGNATURE:  Electronically signed by Tom Morton RN on 5/21/21 at 11:45 AM EDT    CASE MANAGEMENT/SOCIAL WORK SECTION    Inpatient Status Date: ***    Readmission Risk Assessment Score:  Readmission Risk              Risk of Unplanned Readmission:  0           Discharging to Facility/ Agency   · Name: [facility]  Address:   [place] [address]         Phone: [phone]            / signature: Electronically signed by Cristel Sims RN on 5/21/21 at 11:42 AM EDT    PHYSICIAN SECTION    Prognosis: Good    Condition at Discharge: Stable    Rehab Potential (if transferring to Rehab): Good    Recommended Labs or Other Treatments After Discharge: PT,OT, nurse checks for medication checks and christensen care, skilled nursing assessment    Physician Certification: I certify the above information and transfer of Addie Razo  is necessary for the continuing treatment of the diagnosis listed and that he requires [address] for less 30 days.      Update Admission H&P: Changes in H&P as follows - Abdominal incisions from prostatectomy, christensen catheter in place, ONLY TO BE REMOVED BY UROLOGY    PHYSICIAN SIGNATURE:  Electronically signed by Joy Kong MD on 5/21/21 at 11:33 AM EDT

## 2021-05-21 VITALS
HEIGHT: 74 IN | DIASTOLIC BLOOD PRESSURE: 74 MMHG | TEMPERATURE: 98.3 F | RESPIRATION RATE: 16 BRPM | HEART RATE: 81 BPM | SYSTOLIC BLOOD PRESSURE: 141 MMHG | OXYGEN SATURATION: 92 % | WEIGHT: 200 LBS | BODY MASS INDEX: 25.67 KG/M2

## 2021-05-21 LAB
ANION GAP SERPL CALCULATED.3IONS-SCNC: 10 MMOL/L (ref 9–17)
BUN BLDV-MCNC: 31 MG/DL (ref 8–23)
BUN/CREAT BLD: ABNORMAL (ref 9–20)
CALCIUM SERPL-MCNC: 8.1 MG/DL (ref 8.6–10.4)
CHLORIDE BLD-SCNC: 110 MMOL/L (ref 98–107)
CO2: 18 MMOL/L (ref 20–31)
CREAT SERPL-MCNC: 2.59 MG/DL (ref 0.7–1.2)
GFR AFRICAN AMERICAN: 30 ML/MIN
GFR NON-AFRICAN AMERICAN: 25 ML/MIN
GFR SERPL CREATININE-BSD FRML MDRD: ABNORMAL ML/MIN/{1.73_M2}
GFR SERPL CREATININE-BSD FRML MDRD: ABNORMAL ML/MIN/{1.73_M2}
GLUCOSE BLD-MCNC: 122 MG/DL (ref 75–110)
GLUCOSE BLD-MCNC: 129 MG/DL (ref 75–110)
GLUCOSE BLD-MCNC: 133 MG/DL (ref 70–99)
HCT VFR BLD CALC: 29.3 % (ref 40.7–50.3)
HEMOGLOBIN: 9.3 G/DL (ref 13–17)
MCH RBC QN AUTO: 30 PG (ref 25.2–33.5)
MCHC RBC AUTO-ENTMCNC: 31.7 G/DL (ref 28.4–34.8)
MCV RBC AUTO: 94.5 FL (ref 82.6–102.9)
NRBC AUTOMATED: 0 PER 100 WBC
PDW BLD-RTO: 12.1 % (ref 11.8–14.4)
PLATELET # BLD: 96 K/UL (ref 138–453)
PMV BLD AUTO: 11.7 FL (ref 8.1–13.5)
POTASSIUM SERPL-SCNC: 4.3 MMOL/L (ref 3.7–5.3)
RBC # BLD: 3.1 M/UL (ref 4.21–5.77)
SODIUM BLD-SCNC: 138 MMOL/L (ref 135–144)
WBC # BLD: 4.8 K/UL (ref 3.5–11.3)

## 2021-05-21 PROCEDURE — 6370000000 HC RX 637 (ALT 250 FOR IP): Performed by: STUDENT IN AN ORGANIZED HEALTH CARE EDUCATION/TRAINING PROGRAM

## 2021-05-21 PROCEDURE — 36415 COLL VENOUS BLD VENIPUNCTURE: CPT

## 2021-05-21 PROCEDURE — 82947 ASSAY GLUCOSE BLOOD QUANT: CPT

## 2021-05-21 PROCEDURE — 2580000003 HC RX 258: Performed by: STUDENT IN AN ORGANIZED HEALTH CARE EDUCATION/TRAINING PROGRAM

## 2021-05-21 PROCEDURE — 85027 COMPLETE CBC AUTOMATED: CPT

## 2021-05-21 PROCEDURE — G0378 HOSPITAL OBSERVATION PER HR: HCPCS

## 2021-05-21 PROCEDURE — 6360000002 HC RX W HCPCS: Performed by: STUDENT IN AN ORGANIZED HEALTH CARE EDUCATION/TRAINING PROGRAM

## 2021-05-21 PROCEDURE — 80048 BASIC METABOLIC PNL TOTAL CA: CPT

## 2021-05-21 RX ADMIN — GABAPENTIN 100 MG: 100 CAPSULE ORAL at 08:32

## 2021-05-21 RX ADMIN — FAMOTIDINE 20 MG: 20 TABLET, FILM COATED ORAL at 08:33

## 2021-05-21 RX ADMIN — NIFEDIPINE 90 MG: 90 TABLET, FILM COATED, EXTENDED RELEASE ORAL at 08:32

## 2021-05-21 RX ADMIN — ACETAMINOPHEN 650 MG: 325 TABLET ORAL at 13:24

## 2021-05-21 RX ADMIN — TACROLIMUS 6 MG: 1 CAPSULE ORAL at 08:33

## 2021-05-21 RX ADMIN — METOPROLOL SUCCINATE 50 MG: 50 TABLET, FILM COATED, EXTENDED RELEASE ORAL at 09:31

## 2021-05-21 RX ADMIN — SODIUM CHLORIDE, PRESERVATIVE FREE 10 ML: 5 INJECTION INTRAVENOUS at 08:33

## 2021-05-21 RX ADMIN — CEFAZOLIN SODIUM 2000 MG: 1 INJECTION, POWDER, FOR SOLUTION INTRAMUSCULAR; INTRAVENOUS at 00:35

## 2021-05-21 RX ADMIN — EVEROLIMUS TABLETS 1.5 MG: 0.75 TABLET ORAL at 08:33

## 2021-05-21 RX ADMIN — DESMOPRESSIN ACETATE 40 MG: 0.2 TABLET ORAL at 08:33

## 2021-05-21 RX ADMIN — OXYCODONE HYDROCHLORIDE 10 MG: 5 TABLET ORAL at 05:35

## 2021-05-21 RX ADMIN — CEFAZOLIN SODIUM 2000 MG: 1 INJECTION, POWDER, FOR SOLUTION INTRAMUSCULAR; INTRAVENOUS at 11:00

## 2021-05-21 RX ADMIN — MAGNESIUM GLUCONATE 500 MG ORAL TABLET 400 MG: 500 TABLET ORAL at 08:32

## 2021-05-21 RX ADMIN — ACETAMINOPHEN 650 MG: 325 TABLET ORAL at 05:34

## 2021-05-21 ASSESSMENT — PAIN SCALES - GENERAL: PAINLEVEL_OUTOF10: 4

## 2021-05-21 NOTE — FLOWSHEET NOTE
Assessment: Patient was awake and alert when  visited. Family was not present at the time. Patient looked a bit worried but was open to spiritual care. When asked how he was feeling, patient responded; \"not too good. \" Patient said he would be going home today. Patient was raised Rastafari.  Intervention:  maintained listening presence, offered support and prayed with patient. Outcome: Patient expressed gratitude for the prayer and spiritual support he received. Chaplains would continue to remain available for more prayers and support as needed. 05/21/21 1310   Encounter Summary   Services provided to: Patient   Support System Family members   Place of [address] Visiting   (05/21/2021)   Complexity of Encounter Moderate   Length of Encounter 30 minutes   Spiritual Assessment Completed Yes   Routine   Type Initial   Assessment Approachable; Anxious   Intervention Active listening;Prayer;[place]   Outcome Expressed gratitude   Spiritual/Latter-day   Type Spiritual support

## 2021-05-21 NOTE — CARE COORDINATION
Transitional planning. Informed Rhonda Patel with [facility] that pt is discharging today. They will gather what they need in [facility] and call Pt for start of care. [address] [facility] pt's [employer], spoke to Alea, scheduled transportation. Trip number  [phone]  CM will received call when transportation is finalized.  Transportation can take up to 3 hours (5:20pm)

## 2021-05-21 NOTE — PROGRESS NOTES
Discharge instructions given to patient. Patient instructed on care of christensen catheter, surgical site care, discharge medications, and importance of follow-up. Patient verbalized understanding.     Electronically signed by Ramsey Stringer RN on 5/21/2021 at 2:08 PM

## 2021-05-21 NOTE — PLAN OF CARE
Problem: Pain:  Goal: Pain level will decrease  Description: Pain level will decrease  5/20/2021 2325 by uLis Alberto Gardiner RN  Outcome: Ongoing  5/20/2021 1411 by Vanessa Nicole RN  Outcome: Ongoing  Goal: Control of acute pain  Description: Control of acute pain  5/20/2021 2325 by Luis Alberto Gardiner RN  Outcome: Ongoing  5/20/2021 1411 by Vanessa Nicole RN  Outcome: Ongoing  Goal: Control of chronic pain  Description: Control of chronic pain  5/20/2021 2325 by Luis Alberto Gardiner RN  Outcome: Ongoing  5/20/2021 1411 by Vanessa Nicole RN  Outcome: Ongoing

## 2021-05-21 NOTE — DISCHARGE SUMMARY
DISCHARGE SUMMARY NOTE:        Patient Identification  PATIENT: Cricket Damon is a 80 y.o. male. MRN: [id number]  :  1950  Admit Date:  2021  Discharge date:   21                                  Disposition: home  Discharged Condition:  good  Discharge Diagnoses:   Prostate cancer  DM type 2   Hx renal transplant    Consults: none    Surgery: CYSTOSCOPY, XI ROBOTIC LAPAROSCOPIC PROSTATECTOMY DISSECTION And right stent placement    Patient Instructions: Activity: Per discharge instructions  Diet: As tolerated  Patient told to follow up with Klaudia Jules MD in 1 week(s). Discharge Medications:    [place]   Home Medication Instructions QLL:[id number]    Printed on:21 1136   Medication Information                      atorvastatin (LIPITOR) 40 MG tablet  TAKE 1 TABLET BY MOUTH DAILY             Blood Glucose Monitoring Suppl (ONE TOUCH ULTRA 2) w/Device KIT  Check blood sugar four times daily             blood glucose test strips (ASCENSIA AUTODISC VI;ONE TOUCH ULTRA TEST VI) strip  Use with associated glucose meter. True Metrix. Testing four times daily             ciprofloxacin (CIPRO) 500 MG tablet  Take 1 tablet by mouth 2 times daily for 3 days             Everolimus 0.75 MG TABS  2 times daily 3 tabs 2 times daily             famotidine (PEPCID) 20 MG tablet  TAKE 1 TABLET BY MOUTH TWICE A DAY             FREESTYLE LITE strip  USE TO TEST TWICE A DAY AS DIRECTED             gabapentin (NEURONTIN) 100 MG capsule  TAKE 1 CAPSULE BY MOUTH TWICE A DAY             hyoscyamine (LEVSIN/SL) 125 MCG sublingual tablet  Take 1 tablet by mouth every 4 hours as needed for Cramping             Incontinence Supply Disposable (DISPOSABLE BRIEF MEDIUM) MISC  6 briefs per day/180 per month 3 refills             insulin glargine (BASAGLAR KWIKPEN) 100 UNIT/ML injection pen  Inject 18 Units into the skin nightly             insulin lispro (HUMALOG KWIKPEN) 100 UNIT/ML pen  Per Sliding scale. Max 28 units per day             Insulin Pen Needle (TECHLITE PEN NEEDLES) 32G X 6 MM MISC  USE AS DIRECTED. WITH INSULIN PEN UP TO 5 TIMES PER DAY             Insulin Pen Needle (ULTICARE MINI PEN NEEDLES) 31G X 6 MM MISC  PATIENT IS TESTING UP TO 5 TIMES DAILY             Lancet Device MISC  1 Device by Does not apply route once for 1 dose             Lancets MISC  1 each by Does not apply route daily             magnesium oxide (MAG-OX) 400 (241.3 Mg) MG TABS tablet  TAKE 1 TABLET BY MOUTH TWICE A DAY             metoprolol succinate (TOPROL XL) 50 MG extended release tablet  Take 1 tablet by mouth daily             NIFEdipine (PROCARDIA XL) 90 MG extended release tablet  TAKE 1 TABLET BY MOUTH DAILY             oxyCODONE-acetaminophen (PERCOCET) 5-325 MG per tablet  Take 1 tablet by mouth every 6 hours as needed for Pain for up to 5 days. May take 2 tablets if needed             polyethylene glycol (GLYCOLAX) 17 GM/SCOOP powder  Take 17 g by mouth daily as needed (while taking narcotics to avoid constipation)             tacrolimus (PROGRAF) 1 MG capsule  Take 6 mg by mouth 2 times daily              ZORTRESS 1 MG TABS  Take 1 mg by mouth 2 times daily                 Hospital course: Addie Razo is an 80 y.o. that underwent a Robotic Assisted Laparoscopic Prostatectomy by Asad Gao MD  on  5/19/2021. For more details please see the operative report. Admitted post op for pain control. The patient did well in their hospital course. The diet was advanced to regular. The patient had pain controlled with PO meds, tolerated diet, and was ambulating appropriately. Hemoglobin was stable on discharge at 9.3. Pt did stay POD1 due to pain control. Left on POD2. The patient was afebrile for 24 hrs before discharge.  They were given a prescription for antibiotic to take starting the day prior to catheter removal. The patient agrees to discharge, understands discharge instructions, and agrees to follow up for cystogram and christensen catheter removal as scheduled.        Ginny Espinoza PA-C PA-C  11:36 AM 5/21/2021

## 2021-05-21 NOTE — PLAN OF CARE
Problem: Pain:  Goal: Pain level will decrease  Description: Pain level will decrease  Outcome: Completed  Goal: Control of acute pain  Description: Control of acute pain  Outcome: Completed  Goal: Control of chronic pain  Description: Control of chronic pain  Outcome: Completed     Problem: Falls - Risk of:  Goal: Will remain free from falls  Description: Will remain free from falls  Outcome: Completed  Goal: Absence of physical injury  Description: Absence of physical injury  Outcome: Completed     Problem: Urinary Elimination:  Goal: Signs and symptoms of infection will decrease  Description: Signs and symptoms of infection will decrease  Outcome: Completed  Goal: Complications related to the disease process, condition or treatment will be avoided or minimized  Description: Complications related to the disease process, condition or treatment will be avoided or minimized  Outcome: Completed

## 2021-05-24 ENCOUNTER — TELEPHONE (OUTPATIENT)
Dept: FAMILY MEDICINE CLINIC | Age: 71
End: 2021-05-24

## 2021-05-24 LAB — SURGICAL PATHOLOGY REPORT: NORMAL

## 2021-05-24 NOTE — TELEPHONE ENCOUNTER
[facility] Transitions Initial Follow Up Call    Outreach made within 2 business days of discharge: Yes    Patient: Bobbi Osler Patient : 1950   MRN: [id number]  Reason for Admission: There are no discharge diagnoses documented for the most recent discharge.   Discharge Date: 21       Spoke with: left a message     Discharge department/facility: [facility]         Scheduled appointment with PCP within 7-14 days    Follow Up  Future Appointments   Date Time Provider Soham Nguyen   2021 10:00 AM [facility] [place] ROOM 1 [facility] Radiolog   2021 11:00 AM BJORN Almodovar - CNP [address], [place]

## 2021-05-26 ENCOUNTER — OFFICE VISIT (OUTPATIENT)
Dept: UROLOGY | Age: 71
End: 2021-05-26

## 2021-05-26 ENCOUNTER — HOSPITAL ENCOUNTER (OUTPATIENT)
Dept: GENERAL RADIOLOGY | Age: 71
Discharge: HOME | End: 2021-05-28
Payer: COMMERCIAL

## 2021-05-26 VITALS
WEIGHT: 200 LBS | DIASTOLIC BLOOD PRESSURE: 85 MMHG | SYSTOLIC BLOOD PRESSURE: 136 MMHG | TEMPERATURE: 97.5 F | HEART RATE: 90 BPM | HEIGHT: 74 IN | BODY MASS INDEX: 25.67 KG/M2

## 2021-05-26 DIAGNOSIS — C61 PROSTATE CANCER (HCC): ICD-10-CM

## 2021-05-26 DIAGNOSIS — C61 PROSTATE CANCER (HCC): Primary | ICD-10-CM

## 2021-05-26 DIAGNOSIS — Z90.79 HISTORY OF ROBOT-ASSISTED LAPAROSCOPIC RADICAL PROSTATECTOMY: ICD-10-CM

## 2021-05-26 PROCEDURE — 74430 CONTRAST X-RAY BLADDER: CPT

## 2021-05-26 PROCEDURE — 99024 POSTOP FOLLOW-UP VISIT: CPT | Performed by: NURSE PRACTITIONER

## 2021-05-26 PROCEDURE — 6360000004 HC RX CONTRAST MEDICATION: Performed by: UROLOGY

## 2021-05-26 RX ORDER — CIPROFLOXACIN 500 MG/1
500 TABLET, FILM COATED ORAL 2 TIMES DAILY
Qty: 6 TABLET | Refills: 0 | Status: SHIPPED | OUTPATIENT
Start: 2021-05-26 | End: 2021-05-29

## 2021-05-26 RX ADMIN — IOPAMIDOL 175 ML: 612 INJECTION, SOLUTION INTRAVENOUS at 10:52

## 2021-05-26 ASSESSMENT — ENCOUNTER SYMPTOMS
EYES NEGATIVE: 1
BACK PAIN: 1
EYE PAIN: 0
NAUSEA: 0
ABDOMINAL PAIN: 1
WHEEZING: 0
VOMITING: 0
RESPIRATORY NEGATIVE: 1
COUGH: 0
EYE REDNESS: 0
DIARRHEA: 0
CONSTIPATION: 1
SHORTNESS OF BREATH: 0

## 2021-05-26 NOTE — PROGRESS NOTES
Review of Systems   Constitutional: Negative. Negative for appetite change, chills and fatigue. Eyes: Negative. Negative for pain, redness and visual disturbance. Respiratory: Negative. Negative for cough, shortness of breath and wheezing. Cardiovascular: Negative. Negative for chest pain and leg swelling. Gastrointestinal: Positive for abdominal pain and constipation (haven't had a BM in a couple of days). Negative for diarrhea, nausea and vomiting. Genitourinary: Negative for difficulty urinating, dysuria, flank pain, frequency, hematuria and urgency. Musculoskeletal: Positive for back pain and myalgias. Negative for joint swelling. Skin: Negative. Negative for rash and wound. Neurological: Negative for dizziness, weakness and numbness. Hematological: Negative. Does not bruise/bleed easily.

## 2021-05-26 NOTE — PATIENT INSTRUCTIONS
Cipro today on the way home and start today      Will order pull ups     Path discussed- PSA in 6 weeks prior to next appt.         Kegel exercise: squeeze and hold 3 seconds, relax and release 6 seconds, repeat 10- 15 times in a row, at lease 6 times  per day     Quick flicks: after kegel, squeeze, release no hold, 12-15 times in a row, at least 6 times per day      PSA 6 weeks and follow up with Dr Sandro Pardo.      10# lifting restriction      Call with questions or concerns

## 2021-05-26 NOTE — PROGRESS NOTES
radiology report verified)    PAST MEDICAL, FAMILY AND SOCIAL HISTORY UPDATE:  Past Medical History:   Diagnosis Date    Anemia     CAD (coronary artery disease)     ([facility])    CKD (chronic kidney disease) stage 4, GFR 15-29 ml/min ([facility])     COVID-19 03/2020    Elevated PSA, between 10 and less than 20 ng/ml     [facility]     Hemodialysis access, fistula mature ([facility]     right arm    Hemod[facility] patient [facility])     patient had Kidney transplant-no longer on dialysis    Hepatitis C without hepatic coma     Hx of inguinal hernia repair     Hyperlipidemia     Hypertension     Irregular heart rate     wearing holter monitor 1/29/21    Leukopenia 01/29/2021    Palpitations     [facility] cancer [facility]     prostate    [facility]2017    [facility]    Thrombocytopenia ([facility] 01/29/2021    Type 2 diabetes mellitus ([facility]     Under care of team 05/04/2021    nephrology-Dr [facility]-last visit april 2021    Under care of team 05/04/2021    [facility]iology-[facility] -last visit onc 2020    Wellness examination 05/04/2021    pcp-Dr [facility]-last visit march 2021     Past Surgical History:   Procedure Laterality Date    CARDIAC CATHETERIZATION      Patient states normal    COLONOSCOPY N/A 10/30/2019    COLONOSCOPY WITH BIOPSY performed by Melvin Oconnor MD at [place] [address] Right 05/19/2021     CYSTOSCOPY URETERAL STENT INSERTION (Right )    CYSTOSCOPY Right 5/19/2021    CYSTOSCOPY performed by Conchita Staples MD at [address] [facility] Right 11/2015    at [facility] [address]    left inguinal    KIDNEY TRANSPLANT  2017    right    LIVER BIOPSY      PROSTATE BIOPSY N/A 2/12/2021    PROSTATE BIOPSY WITH ULTRASOUND TO OR WITH TECH performed by Debra Andre MD at [address] [place] [address]  05/19/2021    XI ROBOTIC LAPAROSCOPIC PROSTATECTOMY WITH STANDARD PELVIC LYMPHNODE DISSECTION AND LEFT PELVIC LYMPHNODE DISSECTION     PROSTATECTOMY N/A 5/19/2021    XI ROBOTIC LAPAROSCOPIC PROSTATECTOMY DISSECTION performed by Farooq Munguia MD at [address]  2/12/2021    US PROSTATE NEEDLE PUNCH 2/12/2021 [facility] ULTRASOUND     Family History   Problem Relation Age of Onset    Cancer Mother         Lymphoma    Lung Cancer Father     Colon Cancer Brother      Outpatient Medications Marked as Taking for the 5/26/21 encounter (Office Visit) with Surinder Brothers, APRN - CNP   Medication Sig Dispense Refill    ciprofloxacin (CIPRO) 500 MG tablet Take 1 tablet by mouth 2 times daily for 6 doses 6 tablet 0    polyethylene glycol (GLYCOLAX) 17 GM/SCOOP powder Take 17 g by mouth daily as needed (while taking narcotics to avoid constipation) 510 g 0    hyoscyamine (LEVSIN/SL) 125 MCG sublingual tablet Take 1 tablet by mouth every 4 hours as needed for Cramping 30 tablet 0    Incontinence Supply Disposable (DISPOSABLE BRIEF MEDIUM) MISC 6 briefs per day/180 per month 3 refills 180 mL 0    blood glucose test strips (ASCENSIA AUTODISC VI;ONE TOUCH ULTRA TEST VI) strip Use with associated glucose meter. True Metrix. Testing four times daily 200 strip 1    ZORTRESS 1 MG TABS Take 1 mg by mouth 2 times daily      atorvastatin (LIPITOR) 40 MG tablet TAKE 1 TABLET BY MOUTH DAILY 90 tablet 0    famotidine (PEPCID) 20 MG tablet TAKE 1 TABLET BY MOUTH TWICE A DAY 60 tablet 3    insulin glargine (BASAGLAR KWIKPEN) 100 UNIT/ML injection pen Inject 18 Units into the skin nightly (Patient taking differently: Inject 15 Units into the skin nightly Per Dr. Fannie Hutchinson) 5 pen 3    Insulin Pen Needle (TECHLITE PEN NEEDLES) 32G X 6 MM MISC USE AS DIRECTED.  WITH INSULIN PEN UP TO 5 TIMES PER  each 3    FREESTYLE LITE strip USE TO TEST TWICE A DAY AS DIRECTED 50 each 5    Blood Glucose Monitoring Suppl (ONE TOUCH ULTRA 2) w/Device KIT Check blood sugar four times daily 1 kit 0   

## 2021-05-27 ENCOUNTER — NURSE TRIAGE (OUTPATIENT)
Dept: OTHER | Facility: CLINIC | Age: 71
End: 2021-05-27

## 2021-05-27 DIAGNOSIS — I10 HTN, GOAL BELOW 130/80: ICD-10-CM

## 2021-05-27 DIAGNOSIS — R00.2 PALPITATION: ICD-10-CM

## 2021-05-27 RX ORDER — METOPROLOL SUCCINATE 50 MG/1
50 TABLET, EXTENDED RELEASE ORAL DAILY
Qty: 90 TABLET | Refills: 0 | Status: SHIPPED | OUTPATIENT
Start: 2021-05-27 | End: 2021-06-17

## 2021-05-27 NOTE — TELEPHONE ENCOUNTER
Jeannie Abbott is calling to request a refill on the following medication(s):    Medication Request:  Requested Prescriptions     Pending Prescriptions Disp Refills    metoprolol succinate (TOPROL XL) 50 MG extended release tablet [Pharmacy Med Name: Metoprolol Succinate ER 50MG TB24] 90 tablet 1     Sig: TAKE 1 TABLET BY MOUTH DAILY       Last Visit Date (If Applicable):  0/93/8532 In office    Next Visit Date:    Visit date not found

## 2021-05-28 DIAGNOSIS — Z76.0 MEDICATION REFILL: ICD-10-CM

## 2021-05-28 RX ORDER — ATORVASTATIN CALCIUM 40 MG/1
TABLET, FILM COATED ORAL
Qty: 90 TABLET | Refills: 0 | Status: SHIPPED | OUTPATIENT
Start: 2021-05-28 | End: 2021-07-22

## 2021-06-04 ENCOUNTER — TELEPHONE (OUTPATIENT)
Dept: FAMILY MEDICINE CLINIC | Age: 71
End: 2021-06-04

## 2021-06-09 DIAGNOSIS — Z76.0 MEDICATION REFILL: ICD-10-CM

## 2021-06-09 DIAGNOSIS — I10 HTN, GOAL BELOW 130/80: ICD-10-CM

## 2021-06-09 RX ORDER — NIFEDIPINE 90 MG/1
90 TABLET, EXTENDED RELEASE ORAL DAILY
Qty: 90 TABLET | Refills: 1 | Status: SHIPPED | OUTPATIENT
Start: 2021-06-09 | End: 2021-08-19 | Stop reason: SDUPTHER

## 2021-06-09 NOTE — TELEPHONE ENCOUNTER
Bruce North is calling to request a refill on the following medication(s):    Medication Request:  Requested Prescriptions     Pending Prescriptions Disp Refills    NIFEdipine (PROCARDIA XL) 90 MG extended release tablet [Pharmacy Med Name: NIFEdipine ER Osmotic Release 90MG TB24] 90 tablet 1     Sig: TAKE 1 TABLET BY MOUTH DAILY       Last Visit Date (If Applicable):  5/83/7827    Next Visit Date:    Visit date not found

## 2021-06-16 ENCOUNTER — TELEPHONE (OUTPATIENT)
Dept: FAMILY MEDICINE CLINIC | Age: 71
End: 2021-06-16

## 2021-06-16 NOTE — TELEPHONE ENCOUNTER
Would need appt to discuss insulin regimen. Sounds like there is confusion on patient and home care part.

## 2021-06-16 NOTE — TELEPHONE ENCOUNTER
Pt is taking the basaglar in the am because he is afraid he won't wake up if he takes it at night. Pt is taking too much insulin. He tells healthcare worker that he has a sliding scale  But he says he just says. He is double dosing his Lantus . 18 units in the morning and 15 units at night. Blood sugars have 250-275. Today was 390. He drank OJ and apple juice today. Healthcare worker needs to know the sliding scale for this patient so that she can educate the patient.  Please fax sliding scale and any written orders to [facility]

## 2021-06-17 DIAGNOSIS — I10 HTN, GOAL BELOW 130/80: ICD-10-CM

## 2021-06-17 DIAGNOSIS — R00.2 PALPITATION: ICD-10-CM

## 2021-06-17 RX ORDER — METOPROLOL SUCCINATE 50 MG/1
50 TABLET, EXTENDED RELEASE ORAL DAILY
Qty: 90 TABLET | Refills: 0 | Status: SHIPPED | OUTPATIENT
Start: 2021-06-17 | End: 2021-08-19 | Stop reason: SDUPTHER

## 2021-06-17 NOTE — TELEPHONE ENCOUNTER
Laine Medeiros is calling to request a refill on the following medication(s):    Medication Request:  Requested Prescriptions     Pending Prescriptions Disp Refills    metoprolol succinate (TOPROL XL) 50 MG extended release tablet [Pharmacy Med Name: Metoprolol Succinate ER 50MG TB24] 90 tablet 0     Sig: TAKE 1 TABLET BY MOUTH DAILY       Last Visit Date (If Applicable):  4/11/1974    Next Visit Date:    6/22/2021

## 2021-06-22 ENCOUNTER — OFFICE VISIT (OUTPATIENT)
Dept: FAMILY MEDICINE CLINIC | Age: 71
End: 2021-06-22
Payer: COMMERCIAL

## 2021-06-22 VITALS
SYSTOLIC BLOOD PRESSURE: 120 MMHG | HEART RATE: 73 BPM | OXYGEN SATURATION: 96 % | DIASTOLIC BLOOD PRESSURE: 80 MMHG | BODY MASS INDEX: 22.6 KG/M2 | TEMPERATURE: 97.5 F | WEIGHT: 176 LBS

## 2021-06-22 DIAGNOSIS — K62.89 RECTAL PAIN: ICD-10-CM

## 2021-06-22 DIAGNOSIS — E11.65 TYPE 2 DIABETES MELLITUS WITH HYPERGLYCEMIA, WITH LONG-TERM CURRENT USE OF INSULIN (HCC): Primary | ICD-10-CM

## 2021-06-22 DIAGNOSIS — I10 HTN, GOAL BELOW 130/80: ICD-10-CM

## 2021-06-22 DIAGNOSIS — E78.5 DYSLIPIDEMIA: ICD-10-CM

## 2021-06-22 DIAGNOSIS — Z79.4 TYPE 2 DIABETES MELLITUS WITH HYPERGLYCEMIA, WITH LONG-TERM CURRENT USE OF INSULIN (HCC): Primary | ICD-10-CM

## 2021-06-22 PROCEDURE — 3046F HEMOGLOBIN A1C LEVEL >9.0%: CPT | Performed by: FAMILY MEDICINE

## 2021-06-22 PROCEDURE — 99214 OFFICE O/P EST MOD 30 MIN: CPT | Performed by: FAMILY MEDICINE

## 2021-06-22 PROCEDURE — 1036F TOBACCO NON-USER: CPT | Performed by: FAMILY MEDICINE

## 2021-06-22 PROCEDURE — G8427 DOCREV CUR MEDS BY ELIG CLIN: HCPCS | Performed by: FAMILY MEDICINE

## 2021-06-22 PROCEDURE — G8420 CALC BMI NORM PARAMETERS: HCPCS | Performed by: FAMILY MEDICINE

## 2021-06-22 PROCEDURE — 1123F ACP DISCUSS/DSCN MKR DOCD: CPT | Performed by: FAMILY MEDICINE

## 2021-06-22 PROCEDURE — 3017F COLORECTAL CA SCREEN DOC REV: CPT | Performed by: FAMILY MEDICINE

## 2021-06-22 PROCEDURE — 4040F PNEUMOC VAC/ADMIN/RCVD: CPT | Performed by: FAMILY MEDICINE

## 2021-06-22 PROCEDURE — 2022F DILAT RTA XM EVC RTNOPTHY: CPT | Performed by: FAMILY MEDICINE

## 2021-06-22 RX ORDER — INSULIN GLARGINE 100 [IU]/ML
15 INJECTION, SOLUTION SUBCUTANEOUS NIGHTLY
Qty: 5 PEN | Refills: 0
Start: 2021-06-22 | End: 2021-08-19 | Stop reason: SDUPTHER

## 2021-06-22 RX ORDER — INSULIN LISPRO 100 [IU]/ML
15 INJECTION, SOLUTION INTRAVENOUS; SUBCUTANEOUS
Qty: 5 PEN | Refills: 0
Start: 2021-06-22 | End: 2021-08-19 | Stop reason: SDUPTHER

## 2021-06-22 RX ORDER — TAMSULOSIN HYDROCHLORIDE 0.4 MG/1
CAPSULE ORAL DAILY
COMMUNITY
Start: 2021-06-09 | End: 2021-06-22

## 2021-06-22 SDOH — ECONOMIC STABILITY: FOOD INSECURITY: WITHIN THE PAST 12 MONTHS, THE FOOD YOU BOUGHT JUST DIDN'T LAST AND YOU DIDN'T HAVE MONEY TO GET MORE.: NEVER TRUE

## 2021-06-22 SDOH — ECONOMIC STABILITY: FOOD INSECURITY: WITHIN THE PAST 12 MONTHS, YOU WORRIED THAT YOUR FOOD WOULD RUN OUT BEFORE YOU GOT MONEY TO BUY MORE.: NEVER TRUE

## 2021-06-22 ASSESSMENT — PATIENT HEALTH QUESTIONNAIRE - PHQ9
SUM OF ALL RESPONSES TO PHQ9 QUESTIONS 1 & 2: 0
SUM OF ALL RESPONSES TO PHQ QUESTIONS 1-9: 0
1. LITTLE INTEREST OR PLEASURE IN DOING THINGS: 0
2. FEELING DOWN, DEPRESSED OR HOPELESS: 0

## 2021-06-22 ASSESSMENT — SOCIAL DETERMINANTS OF HEALTH (SDOH)
HOW HARD IS IT FOR YOU TO PAY FOR THE VERY BASICS LIKE FOOD, HOUSING, MEDICAL CARE, AND HEATING?: NOT HARD AT ALL
HOW HARD IS IT FOR YOU TO PAY FOR THE VERY BASICS LIKE FOOD, HOUSING, MEDICAL CARE, AND HEATING?: NOT HARD AT ALL

## 2021-06-22 NOTE — PROGRESS NOTES
TABLET BY MOUTH TWICE A DAY 60 tablet 3    gabapentin (NEURONTIN) 100 MG capsule TAKE 1 CAPSULE BY MOUTH TWICE A  capsule 0    Everolimus 0.75 MG TABS 2 times daily 3 tabs 2 times daily      magnesium oxide (MAG-OX) 400 (241.3 Mg) MG TABS tablet TAKE 1 TABLET BY MOUTH TWICE A DAY 60 tablet 2    tacrolimus (PROGRAF) 1 MG capsule Take 6 mg by mouth 2 times daily        No current facility-administered medications for this visit. Allergies:      Allergies   Allergen Reactions    Amino Acids Nausea Only    Lisinopril Nausea Only        Medical History:     Past Medical History:   Diagnosis Date    Anemia     CAD (coronary artery disease)     ([facility])    CKD (chronic kidney disease) stage 4, GFR 15-29 ml/min ([facility])     COVID-19 03/2020    Elevated PSA, between 10 and less than 20 ng/ml     End stage kidney disease ([facility]     [facility], [facility] mature [facility]     right arm    [facility] patient [facility]     patient had Kidney transplant-no longer on dialysis    Hepatitis C without hepatic coma     Hx of inguinal hernia repair     Hyperlipidemia     Hypertension     Irregular heart rate     wearing holter monitor 1/29/21    Leukopenia 01/29/2021    Palpitations     Prostate cancer ([facility]     prostate    [facility]    [facility]    Thrombocytopenia ([facility] 01/29/2021    Type 2 diabetes mellitus ([facility]     Under care of team 05/04/2021    [facility]rology[facility]-last visit april 2021    Under care of team 05/04/2021    [facility] onc 2020    Wellness examination 05/04/2021    pcp[facility]Dr [facility]-last visit march 2021       Past Surgical History:   Procedure Laterality Date    CARDIAC CATHETERIZATION      Patient states normal    COLONOSCOPY N/A 10/30/2019    COLONOSCOPY WITH BIOPSY [facility] Dagmar Clifford MD at [facility] Right 05/19/2021     CYSTOSCOPY URETERAL STENT INSERTION (Right )    CYSTOSCOPY Right 2021    CYSTOSCOPY performed by Carolina Harkins MD at [address] [facility] Right 2015    at [facility]    left inguinal    KIDNEY TRANSPLANT  2017    right    LIVER BIOPSY      PROSTATE BIOPSY N/A 2021    PROSTATE BIOPSY WITH ULTRASOUND TO OR WITH TECH performed by Shanita Faustin MD at [address] [place] [address]  2021    XI ROBOTIC LAPAROSCOPIC PROSTATECTOMY WITH STANDARD PELVIC LYMPHNODE DISSECTION AND LEFT PELVIC LYMPHNODE DISSECTION     PROSTATECTOMY N/A 2021    XI ROBOTIC LAPAROSCOPIC PROSTATECTOMY DISSECTION performed by Carolina Harkins MD at [facility]  2021    US PROSTATE NEEDLE PUNCH 2021 [facility] ULTRASOUND       Family History   Problem Relation Age of Onset   Perry Dash Cancer Mother         Lymphoma    Lung Cancer Father     Colon Cancer Brother         Social History:     Social History     Socioeconomic History    Marital status:      Spouse name: Not on file    Number of children: Not on file    Years of education: Not on file    Highest education level: Not on file   Occupational History    Not on file   Tobacco Use    Smoking status: Former Smoker     Packs/day: 0.50     Years: 50.00     Pack years: 25.00     Types: Cigarettes     Quit date: 2013     Years since quittin.2    Smokeless tobacco: Never Used    Tobacco comment: No smoking in at least 3 years   Vaping Use    Vaping Use: Never used   Substance and Sexual Activity    Alcohol use: No     Comment: No ETOH in 3 years    Drug use: Not Currently     Types:  Other-see comments     Comment: Hx of marijuana and crack cocaine previously - last use was about 3 years ago as of 22    Sexual activity: Never   Other Topics Concern    Not on file   Social History Narrative    Not on file     Social Determinants of Health     Financial Resource Strain: Low Risk     Difficulty of Paying Living HEART: no murmurs, regular rate and rhythm, S1, S2 normal.   LUNGS: clear to auscultation bilaterally, no wheezes, rales, rhonchi. ABDOMEN: normal, bowel sounds present, soft, nontender, nondistended, no rebound guarding or rigidity    Recent Labs/ In Office Testing/ Radiograph review:     No results found for this visit on 06/22/21. Assessment/Plan:     Brielle Medina was seen today for discuss medications. Diagnoses and all orders for this visit:    Type 2 diabetes mellitus with hyperglycemia, with long-term current use of insulin (HCC)  -     insulin glargine (BASAGLAR KWIKPEN) 100 UNIT/ML injection pen; Inject 15 Units into the skin nightly  -     insulin lispro, 1 Unit Dial, (HUMALOG KWIKPEN) 100 UNIT/ML SOPN; Inject 15 Units into the skin 3 times daily (before meals)  -     Hemoglobin A1C; Future  -     Microalbumin, Ur; Future    HTN, goal below 130/80  -     Magnesium; Future  -     Renal Function Panel; Future    Dyslipidemia  -     ALT; Future  -     AST; Future  -     CBC Auto Differential; Future  -     Lipid Panel; Future    BMI 22.0-22.9, adult    Rectal pain  -     Reed Epley, MD, Gastroenterology, [address]    Follow up on labs. Encouraged well balanced low carb diet. Encouraged 150 mins of aerobic activity weekly. Encouraged regular follow up with his specialists. Patient has not been taking his trulicity and has not been taking his meal time insulin as directed. Reviewed BS log with patient. Asked him to return to taking 15 units of lispro with each meal as well as taking his basaglar every night. Asked patient to bring his blood sugar log with him in 2 weeks for review after he returns to taking his insulin regimen as directed. All questions answered and addressed to patient satisfaction. Patient understands and agrees to the plan.      The patient was evaluated and treated today based on the osteopathic principle that each person is a unit of body, mind, and spirit, the body is capable of self-regulation, self-healing, and health maintenance and that structure and function are reciprocally interrelated. Follow-up:   Return in about 2 weeks (around 7/6/2021) for dm; 20 min appt.       Leanne Grayson D.O.

## 2021-06-23 ENCOUNTER — TELEPHONE (OUTPATIENT)
Dept: GASTROENTEROLOGY | Age: 71
End: 2021-06-23

## 2021-06-24 ENCOUNTER — TELEPHONE (OUTPATIENT)
Dept: FAMILY MEDICINE CLINIC | Age: 71
End: 2021-06-24

## 2021-07-06 ENCOUNTER — HOSPITAL ENCOUNTER (OUTPATIENT)
Age: 71
Setting detail: SPECIMEN
Discharge: HOME | End: 2021-07-06
Payer: COMMERCIAL

## 2021-07-06 ENCOUNTER — OFFICE VISIT (OUTPATIENT)
Dept: FAMILY MEDICINE CLINIC | Age: 71
End: 2021-07-06
Payer: COMMERCIAL

## 2021-07-06 VITALS
WEIGHT: 178.8 LBS | TEMPERATURE: 97 F | DIASTOLIC BLOOD PRESSURE: 70 MMHG | HEART RATE: 72 BPM | OXYGEN SATURATION: 97 % | BODY MASS INDEX: 22.96 KG/M2 | SYSTOLIC BLOOD PRESSURE: 138 MMHG

## 2021-07-06 DIAGNOSIS — E11.65 TYPE 2 DIABETES MELLITUS WITH HYPERGLYCEMIA, WITH LONG-TERM CURRENT USE OF INSULIN (HCC): Primary | ICD-10-CM

## 2021-07-06 DIAGNOSIS — E78.5 DYSLIPIDEMIA: ICD-10-CM

## 2021-07-06 DIAGNOSIS — Z79.4 TYPE 2 DIABETES MELLITUS WITH HYPERGLYCEMIA, WITH LONG-TERM CURRENT USE OF INSULIN (HCC): ICD-10-CM

## 2021-07-06 DIAGNOSIS — I10 HTN, GOAL BELOW 130/80: ICD-10-CM

## 2021-07-06 DIAGNOSIS — Z79.4 TYPE 2 DIABETES MELLITUS WITH HYPERGLYCEMIA, WITH LONG-TERM CURRENT USE OF INSULIN (HCC): Primary | ICD-10-CM

## 2021-07-06 DIAGNOSIS — E11.65 TYPE 2 DIABETES MELLITUS WITH HYPERGLYCEMIA, WITH LONG-TERM CURRENT USE OF INSULIN (HCC): ICD-10-CM

## 2021-07-06 LAB
ABSOLUTE EOS #: 0.1 K/UL (ref 0–0.44)
ABSOLUTE IMMATURE GRANULOCYTE: <0.03 K/UL (ref 0–0.3)
ABSOLUTE LYMPH #: 0.72 K/UL (ref 1.1–3.7)
ABSOLUTE MONO #: 0.54 K/UL (ref 0.1–1.2)
ALBUMIN SERPL-MCNC: 4 G/DL (ref 3.5–5.2)
ALT SERPL-CCNC: 17 U/L (ref 5–41)
ANION GAP SERPL CALCULATED.3IONS-SCNC: 13 MMOL/L (ref 9–17)
AST SERPL-CCNC: 17 U/L
BASOPHILS # BLD: 1 % (ref 0–2)
BASOPHILS ABSOLUTE: <0.03 K/UL (ref 0–0.2)
BUN BLDV-MCNC: 41 MG/DL (ref 8–23)
BUN/CREAT BLD: ABNORMAL (ref 9–20)
CALCIUM SERPL-MCNC: 8.7 MG/DL (ref 8.6–10.4)
CHLORIDE BLD-SCNC: 109 MMOL/L (ref 98–107)
CHOLESTEROL/HDL RATIO: 4.1
CHOLESTEROL: 175 MG/DL
CO2: 20 MMOL/L (ref 20–31)
CREAT SERPL-MCNC: 3.06 MG/DL (ref 0.7–1.2)
DIFFERENTIAL TYPE: ABNORMAL
EOSINOPHILS RELATIVE PERCENT: 3 % (ref 1–4)
ESTIMATED AVERAGE GLUCOSE: 203 MG/DL
GFR AFRICAN AMERICAN: 25 ML/MIN
GFR NON-AFRICAN AMERICAN: 20 ML/MIN
GFR SERPL CREATININE-BSD FRML MDRD: ABNORMAL ML/MIN/{1.73_M2}
GFR SERPL CREATININE-BSD FRML MDRD: ABNORMAL ML/MIN/{1.73_M2}
GLUCOSE BLD-MCNC: 230 MG/DL (ref 70–99)
HBA1C MFR BLD: 8.7 % (ref 4–6)
HCT VFR BLD CALC: 28.6 % (ref 40.7–50.3)
HDLC SERPL-MCNC: 43 MG/DL
HEMOGLOBIN: 8.7 G/DL (ref 13–17)
IMMATURE GRANULOCYTES: 0 %
LDL CHOLESTEROL: 114 MG/DL (ref 0–130)
LYMPHOCYTES # BLD: 24 % (ref 24–43)
MAGNESIUM: 2.4 MG/DL (ref 1.6–2.6)
MCH RBC QN AUTO: 28.7 PG (ref 25.2–33.5)
MCHC RBC AUTO-ENTMCNC: 30.4 G/DL (ref 28.4–34.8)
MCV RBC AUTO: 94.4 FL (ref 82.6–102.9)
MONOCYTES # BLD: 18 % (ref 3–12)
NRBC AUTOMATED: 0 PER 100 WBC
PDW BLD-RTO: 13.2 % (ref 11.8–14.4)
PHOSPHORUS: 3.5 MG/DL (ref 2.5–4.5)
PLATELET # BLD: 101 K/UL (ref 138–453)
PLATELET ESTIMATE: ABNORMAL
PMV BLD AUTO: 11.9 FL (ref 8.1–13.5)
POTASSIUM SERPL-SCNC: 5 MMOL/L (ref 3.7–5.3)
RBC # BLD: 3.03 M/UL (ref 4.21–5.77)
RBC # BLD: ABNORMAL 10*6/UL
SEG NEUTROPHILS: 54 % (ref 36–65)
SEGMENTED NEUTROPHILS ABSOLUTE COUNT: 1.59 K/UL (ref 1.5–8.1)
SODIUM BLD-SCNC: 142 MMOL/L (ref 135–144)
TRIGL SERPL-MCNC: 90 MG/DL
VLDLC SERPL CALC-MCNC: NORMAL MG/DL (ref 1–30)
WBC # BLD: 3 K/UL (ref 3.5–11.3)
WBC # BLD: ABNORMAL 10*3/UL

## 2021-07-06 PROCEDURE — 1123F ACP DISCUSS/DSCN MKR DOCD: CPT | Performed by: FAMILY MEDICINE

## 2021-07-06 PROCEDURE — 3017F COLORECTAL CA SCREEN DOC REV: CPT | Performed by: FAMILY MEDICINE

## 2021-07-06 PROCEDURE — 4040F PNEUMOC VAC/ADMIN/RCVD: CPT | Performed by: FAMILY MEDICINE

## 2021-07-06 PROCEDURE — G8420 CALC BMI NORM PARAMETERS: HCPCS | Performed by: FAMILY MEDICINE

## 2021-07-06 PROCEDURE — 3046F HEMOGLOBIN A1C LEVEL >9.0%: CPT | Performed by: FAMILY MEDICINE

## 2021-07-06 PROCEDURE — 1036F TOBACCO NON-USER: CPT | Performed by: FAMILY MEDICINE

## 2021-07-06 PROCEDURE — 99214 OFFICE O/P EST MOD 30 MIN: CPT | Performed by: FAMILY MEDICINE

## 2021-07-06 PROCEDURE — 2022F DILAT RTA XM EVC RTNOPTHY: CPT | Performed by: FAMILY MEDICINE

## 2021-07-06 PROCEDURE — G8427 DOCREV CUR MEDS BY ELIG CLIN: HCPCS | Performed by: FAMILY MEDICINE

## 2021-07-06 RX ORDER — GLUCOSAMINE HCL/CHONDROITIN SU 500-400 MG
CAPSULE ORAL
Qty: 600 STRIP | Refills: 3 | Status: SHIPPED | OUTPATIENT
Start: 2021-07-06 | End: 2021-07-14

## 2021-07-06 ASSESSMENT — PATIENT HEALTH QUESTIONNAIRE - PHQ9
SUM OF ALL RESPONSES TO PHQ QUESTIONS 1-9: 0
SUM OF ALL RESPONSES TO PHQ QUESTIONS 1-9: 0
SUM OF ALL RESPONSES TO PHQ9 QUESTIONS 1 & 2: 0
1. LITTLE INTEREST OR PLEASURE IN DOING THINGS: 0
SUM OF ALL RESPONSES TO PHQ QUESTIONS 1-9: 0
2. FEELING DOWN, DEPRESSED OR HOPELESS: 0

## 2021-07-06 NOTE — PROGRESS NOTES
Progress Note    Renée Bolanos is a 80 y.o.  male who presents today alone for evaluation of   Chief Complaint   Patient presents with    Diabetes    Hyperlipidemia    Hypertension           HPI:   Patient is here for follow up on his DM. Patient returned to his insulin regimen of humalog 15 units with each meal. Patient states he is injecting 15 units of basaglar nightly. Patient FBG's are between 120-250. Patient did have one BS of 300. Patient denies hypoglycemic episodes. Patient does need refills today.      Health Maintenance Due   Topic Date Due    Hepatitis A vaccine (1 of 2 - Risk 2-dose series) Never done    Shingles Vaccine (1 of 2) Never done    Diabetic retinal exam  08/16/2017    Annual Wellness Visit (AWV)  Never done    Diabetic foot exam  11/22/2020        Current Medications:     Current Outpatient Medications   Medication Sig Dispense Refill    blood glucose monitor strips Test blood sugar 4 times daily 600 strip 3    insulin glargine (BASAGLAR KWIKPEN) 100 UNIT/ML injection pen Inject 15 Units into the skin nightly 5 pen 0    insulin lispro, 1 Unit Dial, (HUMALOG KWIKPEN) 100 UNIT/ML SOPN Inject 15 Units into the skin 3 times daily (before meals) 5 pen 0    metoprolol succinate (TOPROL XL) 50 MG extended release tablet TAKE 1 TABLET BY MOUTH DAILY 90 tablet 0    NIFEdipine (PROCARDIA XL) 90 MG extended release tablet TAKE 1 TABLET BY MOUTH DAILY 90 tablet 1    atorvastatin (LIPITOR) 40 MG tablet TAKE 1 TABLET BY MOUTH DAILY 90 tablet 0    ZORTRESS 1 MG TABS Take 1 mg by mouth 2 times daily      famotidine (PEPCID) 20 MG tablet TAKE 1 TABLET BY MOUTH TWICE A DAY 60 tablet 3    Everolimus 0.75 MG TABS 2 times daily 3 tabs 2 times daily      magnesium oxide (MAG-OX) 400 (241.3 Mg) MG TABS tablet TAKE 1 TABLET BY MOUTH TWICE A DAY 60 tablet 2    tacrolimus (PROGRAF) 1 MG capsule Take 6 mg by mouth 2 times daily       gabapentin (NEURONTIN) 100 MG capsule TAKE 1 CAPSULE BY MOUTH TWICE A  capsule 0     No current facility-administered medications for this visit. Allergies:      Allergies   Allergen Reactions    Amino Acids Nausea Only    Lisinopril Nausea Only        Medical History:     Past Medical History:   Diagnosis Date    Anemia     CAD (coronary artery disease)     ([facility])    CKD (chronic kidney disease) stage 4, GFR 15-29 ml/min ([facility])     COVID-19 03/2020    Elevated PSA, between 10 and less than 20 ng/ml     [facility] stage kidney disease ([facility]     Hemodialysis access, fistula mature ([facility]     right arm    Hemodialysis patient [facility])     patient had Kidney transplant-no longer on dialysis    Hepatitis C without hepatic coma     Hx of inguinal hernia repair     Hyperlipidemia     Hypertension     Irregular heart rate     wearing holter monitor 1/29/21    Leukopenia 01/29/2021    Palpitations     [facility] cancer ([facility]     prostate    Renal [facility] recipient [facility]20/2017    [facility]    Thrombocytopenia ([facility] 01/29/2021    Type 2 diabetes mellitus ([facility]     Under care of team 05/04/2021    nephrology-[facility]-last visit april 2021    Under care of team 05/04/2021    [facility]ale[facility]-last visit onc 2020    Wellness examination 05/04/2021    pcp-[facility]-last visit march 2021       Past Surgical History:   Procedure Laterality Date    CARDIAC CATHETERIZATION      Patient states normal    COLONOSCOPY N/A 10/30/2019    COLONOSCOPY WITH BIOPSY performed by Kendra Ram MD at [place] [address] Right 05/19/2021     CYSTOSCOPY URETERAL STENT INSERTION (Right )    CYSTOSCOPY Right 5/19/2021    CYSTOSCOPY performed by Gloria Mckeon MD at [address] [place] [address] Right 11/2015    at [place] [address]    left inguinal    KIDNEY TRANSPLANT  2017    right    LIVER BIOPSY      PROSTATE BIOPSY N/A 2/12/2021    PROSTATE BIOPSY WITH ULTRASOUND TO OR WITH TECH performed by Elpidio Wang MD at [facility]  2021    XI ROBOTIC LAPAROSCOPIC PROSTATECTOMY WITH STANDARD PELVIC LYMPHNODE DISSECTION AND LEFT PELVIC LYMPHNODE DISSECTION     PROSTATECTOMY N/A 2021    XI ROBOTIC LAPAROSCOPIC PROSTATECTOMY DISSECTION performed by Maria L Frost MD at [address]  2021    US PROSTATE NEEDLE PUNCH 2021 [facility] ULTRASOUND       Family History   Problem Relation Age of Onset   [employer] Cancer Mother         Lymphoma    Lung Cancer Father     Colon Cancer Brother         Social History:     Social History     Socioeconomic History    Marital status:      Spouse name: Not on file    Number of children: Not on file    Years of education: Not on file    Highest education level: Not on file   Occupational History    Not on file   Tobacco Use    Smoking status: Former Smoker     Packs/day: 0.50     Years: 50.00     Pack years: 25.00     Types: Cigarettes     Quit date: 2013     Years since quittin.2    Smokeless tobacco: Never Used    Tobacco comment: No smoking in at least 3 years   Vaping Use    Vaping Use: Never used   Substance and Sexual Activity    Alcohol use: No     Comment: No ETOH in 3 years    Drug use: Not Currently     Types: Other-see comments     Comment: Hx of marijuana and crack cocaine previously - last use was about 3 years ago as of 22    Sexual activity: Never   Other Topics Concern    Not on file   Social History Narrative    Not on file     Social Determinants of Health     Financial Resource Strain: Low Risk     Difficulty of Paying Living Expenses: Not hard at all   Food Insecurity: No Food Insecurity    Worried About [address] in the Last Year: Never true    [facility] in the Last Year: Never true   Transportation Needs:     Lack of Transportation (Medical):      Lack of Transportation (Non-Medical):    Physical Activity:     Days of Exercise per Week:     Minutes of Exercise per Session:    Stress:     Feeling of Stress :    Social Connections:     Frequency of Communication with Friends and Family:     Frequency of Social Gatherings with Friends and Family:     Attends Christian Services:     Active Member of Clubs or Organizations:     Attends Club or Organization Meetings:     Marital Status:    Intimate Partner Violence:     Fear of Current or Ex-Partner:     Emotionally Abused:     Physically Abused:     Sexually Abused:         ROS:     Constitutional: No fevers, chills, fatigue. ENT: No nasal congestion or sore throat  Respiratory: No difficulty in breathing or cough. Cardiovascular: No chest pain, palpitations or shortness of breath  Gastrointestinal: No abdominal pain or change in bowel movements. Genitourinary: No change in urinary frequency or dysuria. Skin: No rashes or skin lesions. Neurological: No weakness. No headaches. Last Filed Vitals:  /70   Pulse 72   Temp 97 °F (36.1 °C) (Temporal)   Wt 178 lb 12.8 oz (81.1 kg)   SpO2 97%   BMI 22.96 kg/m²      Physical Examination:     GENERAL APPEARANCE: in no acute distress, well developed, well nourished. HEAD: normocephalic, atraumatic. EYES: extraocular movement intact (EOMI), pupils equal, round, reactive to light and accommodation. EARS: normal, tympanic membrane intact, clear, auditory canal clear. NOSE: nares patent, no erythema, sinuses nontender bilaterally, no rhinorrhea. ORAL CAVITY: mucosa moist, no lesions. THROAT: clear, no mass, no exudate. NECK/THYROID: neck supple, full range of motion, no thyromegaly. HEART: no murmurs, regular rate and rhythm, S1, S2 normal.   LUNGS: clear to auscultation bilaterally, no wheezes, rales, rhonchi.    ABDOMEN: normal, bowel sounds present, soft, nontender, nondistended, no rebound guarding or rigidity    Recent Labs/ In Office Testing/ Radiograph review:     Admission on 05/19/2021, Discharged on 05/21/2021   Component Date Value Ref Range Status    POC Glucose 05/19/2021 95  75 - 110 mg/dL Final    Specimen Description 05/19/2021 . URINE,STRAIGHT CATHETER   Final    Special Requests 05/19/2021 NOT REPORTED   Final    Culture 05/19/2021 NO GROWTH   Final    POC Potassium 05/19/2021 2.4* 3.5 - 4.5 mmol/L Final    Glucose 05/19/2021 122* 70 - 99 mg/dL Final    BUN 05/19/2021 36* 8 - 23 mg/dL Final    CREATININE 05/19/2021 2.75* 0.70 - 1.20 mg/dL Final    Bun/Cre Ratio 05/19/2021 NOT REPORTED  9 - 20 Final    Calcium 05/19/2021 7.8* 8.6 - 10.4 mg/dL Final    Sodium 05/19/2021 141  135 - 144 mmol/L Final    Potassium 05/19/2021 4.2  3.7 - 5.3 mmol/L Final    Chloride 05/19/2021 112* 98 - 107 mmol/L Final    CO2 05/19/2021 21  20 - 31 mmol/L Final    Anion Gap 05/19/2021 8* 9 - 17 mmol/L Final    GFR Non- 05/19/2021 23* >60 mL/min Final    GFR  05/19/2021 28* >60 mL/min Final    GFR Comment 05/19/2021        Final    Comment: Average GFR for 79or more years old:   76 mL/min/1.73sq m  Chronic Kidney Disease:   <60 mL/min/1.73sq m  Kidney failure:   <15 mL/min/1.73sq m              eGFR calculated using average adult body mass. Additional eGFR calculator available at:        [email or web address].br            GFR Staging 05/19/2021 NOT REPORTED   Final    Surgical Pathology Report 05/19/2021    Final                    Value:-- Diagnosis --    Prostate, radical prostatectomy:         Adenocarcinoma, Zoraida score 3+4 = 7. Tumor is bilateral and confined to the prostate. Margins are free of involvement by tumor.        CYNDI Perez  **Electronically Signed Out**         rdd/5/21/2021       Clinical Information  Prostate carcinoma    Source of Specimen  1: PROSTATE AND SEMINAL VESICLES    Gross Description  \"CARMEN GARCIA, PROSTATE AND SEMINAL VESICLES\" 63 gram, 4.8 x 5.4 x  4.5 cm (L x W x H) prostate with attached intact seminal adult body mass. Additional eGFR calculator available at:        Instructure.br            GFR Staging 05/19/2021 NOT REPORTED   Final    POC Glucose 05/19/2021 197* 75 - 110 mg/dL Final    POC Glucose 05/19/2021 202* 75 - 110 mg/dL Final    Glucose 05/20/2021 131* 70 - 99 mg/dL Final    BUN 05/20/2021 35* 8 - 23 mg/dL Final    CREATININE 05/20/2021 2.50* 0.70 - 1.20 mg/dL Final    Bun/Cre Ratio 05/20/2021 NOT REPORTED  9 - 20 Final    Calcium 05/20/2021 8.1* 8.6 - 10.4 mg/dL Final    Sodium 05/20/2021 140  135 - 144 mmol/L Final    Potassium 05/20/2021 4.6  3.7 - 5.3 mmol/L Final    Chloride 05/20/2021 110* 98 - 107 mmol/L Final    CO2 05/20/2021 17* 20 - 31 mmol/L Final    Anion Gap 05/20/2021 13  9 - 17 mmol/L Final    GFR Non- 05/20/2021 26* >60 mL/min Final    GFR  05/20/2021 31* >60 mL/min Final    GFR Comment 05/20/2021        Final    Comment: Average GFR for 79or more years old:   76 mL/min/1.73sq m  Chronic Kidney Disease:   <60 mL/min/1.73sq m  Kidney failure:   <15 mL/min/1.73sq m              eGFR calculated using average adult body mass.  Additional eGFR calculator available at:        Instructure.br            GFR Staging 05/20/2021 NOT REPORTED   Final    WBC 05/20/2021 5.2  3.5 - 11.3 k/uL Final    RBC 05/20/2021 3.27* 4.21 - 5.77 m/uL Final    Hemoglobin 05/20/2021 9.7* 13.0 - 17.0 g/dL Final    Hematocrit 05/20/2021 30.8* 40.7 - 50.3 % Final    MCV 05/20/2021 94.2  82.6 - 102.9 fL Final    MCH 05/20/2021 29.7  25.2 - 33.5 pg Final    MCHC 05/20/2021 31.5  28.4 - 34.8 g/dL Final    RDW 05/20/2021 12.3  11.8 - 14.4 % Final    Platelets 64/32/7026 See Reflexed IPF Result  138 - 453 k/uL Final    MPV 05/20/2021 NOT REPORTED  8.1 - 13.5 fL Final    NRBC Automated 05/20/2021 0.0  0.0 per 100 WBC Final    POC Glucose 05/20/2021 149* 75 - 110 mg/dL Final    Platelet, Immature Fraction 05/20/2021 6.0  1.1 - 10.3 % Final    ORDERED BY LAB    Platelet, Fluorescence 05/20/2021 123* 138 - 453 k/uL Final    ORDERED BY LAB    POC Hemoglobin 05/19/2021 4.2* 13.5 - 17.5 g/dL Final    INTERPRET RESULTS WITH CAUTION. SPECIMEN QUALITY QUESTIONED.  POC Hematocrit 05/19/2021 12* 41 - 53 % Final    INTERPRET RESULTS WITH CAUTION. SPECIMEN QUALITY QUESTIONED.  POC Creatinine 05/19/2021 2.05* 0.51 - 1.19 mg/dL Final    INTERPRET RESULTS WITH CAUTION. SPECIMEN QUALITY QUESTIONED.  GFR Comment 05/19/2021 39* >60 mL/min Final    INTERPRET RESULTS WITH CAUTION. SPECIMEN QUALITY QUESTIONED.  GFR Non- 05/19/2021 32* >60 mL/min Final    INTERPRET RESULTS WITH CAUTION. SPECIMEN QUALITY QUESTIONED.  GFR Comment 05/19/2021        Final    Comment: Average GFR for 79or more years old:   76 mL/min/1.73sq m  Chronic Kidney Disease:   <60 mL/min/1.73sq m  Kidney failure:   <15 mL/min/1.73sq m              eGFR calculated using average adult body mass. Additional eGFR calculator available at:        Koinify.br            POC Potassium 05/19/2021 2.4* 3.5 - 4.5 mmol/L Final    INTERPRET RESULTS WITH CAUTION. SPECIMEN QUALITY QUESTIONED.  POC Chloride 05/19/2021 121* 98 - 107 mmol/L Final    INTERPRET RESULTS WITH CAUTION. SPECIMEN QUALITY QUESTIONED.  POC Ionized Calcium 05/19/2021 0.88* 1.15 - 1.33 mmol/L Final    INTERPRET RESULTS WITH CAUTION. SPECIMEN QUALITY QUESTIONED.  POC TCO2 05/19/2021 15* 20 - 31 mmol/L Final    INTERPRET RESULTS WITH CAUTION. SPECIMEN QUALITY QUESTIONED.  POC BUN 05/19/2021 26* 6 - 20 mg/dL Final    INTERPRET RESULTS WITH CAUTION. SPECIMEN QUALITY QUESTIONED.  POC Lactic Acid 05/19/2021 0.64  0.56 - 1.39 mmol/L Final    INTERPRET RESULTS WITH CAUTION. SPECIMEN QUALITY QUESTIONED.  POC Glucose 05/19/2021 111* 74 - 100 mg/dL Final    INTERPRET RESULTS WITH CAUTION. SPECIMEN QUALITY QUESTIONED.     POC Glucose 05/20/2021 104  75 - 110 mg/dL Final    POC Glucose 05/19/2021 109  75 - 110 mg/dL Final    POC Glucose 05/20/2021 105  75 - 110 mg/dL Final    POC Glucose 05/20/2021 108  75 - 110 mg/dL Final    Glucose 05/21/2021 133* 70 - 99 mg/dL Final    BUN 05/21/2021 31* 8 - 23 mg/dL Final    CREATININE 05/21/2021 2.59* 0.70 - 1.20 mg/dL Final    Bun/Cre Ratio 05/21/2021 NOT REPORTED  9 - 20 Final    Calcium 05/21/2021 8.1* 8.6 - 10.4 mg/dL Final    Sodium 05/21/2021 138  135 - 144 mmol/L Final    Potassium 05/21/2021 4.3  3.7 - 5.3 mmol/L Final    Chloride 05/21/2021 110* 98 - 107 mmol/L Final    CO2 05/21/2021 18* 20 - 31 mmol/L Final    Anion Gap 05/21/2021 10  9 - 17 mmol/L Final    GFR Non- 05/21/2021 25* >60 mL/min Final    GFR  05/21/2021 30* >60 mL/min Final    GFR Comment 05/21/2021        Final    Comment: Average GFR for 79or more years old:   76 mL/min/1.73sq m  Chronic Kidney Disease:   <60 mL/min/1.73sq m  Kidney failure:   <15 mL/min/1.73sq m              eGFR calculated using average adult body mass.  Additional eGFR calculator available at:        DeskActive.br            GFR Staging 05/21/2021 NOT REPORTED   Final    WBC 05/21/2021 4.8  3.5 - 11.3 k/uL Final    RBC 05/21/2021 3.10* 4.21 - 5.77 m/uL Final    Hemoglobin 05/21/2021 9.3* 13.0 - 17.0 g/dL Final    Hematocrit 05/21/2021 29.3* 40.7 - 50.3 % Final    MCV 05/21/2021 94.5  82.6 - 102.9 fL Final    MCH 05/21/2021 30.0  25.2 - 33.5 pg Final    MCHC 05/21/2021 31.7  28.4 - 34.8 g/dL Final    RDW 05/21/2021 12.1  11.8 - 14.4 % Final    Platelets 46/71/5533 96* 138 - 453 k/uL Final    MPV 05/21/2021 11.7  8.1 - 13.5 fL Final    NRBC Automated 05/21/2021 0.0  0.0 per 100 WBC Final    POC Glucose 05/20/2021 122* 75 - 110 mg/dL Final    POC Glucose 05/21/2021 129* 75 - 110 mg/dL Final    POC Glucose 05/21/2021 122* 75 - 110 mg/dL Final       No results found for this visit on 07/06/21. Assessment/Plan:     Demond Beck was seen today for diabetes, hyperlipidemia and hypertension. Diagnoses and all orders for this visit:    Type 2 diabetes mellitus with hyperglycemia, with long-term current use of insulin ([facility]  -     blood glucose monitor strips; Test blood sugar 4 times daily  -     Hemoglobin A1C; Future  -     Microalbumin, Ur; Future    HTN, goal below 130/80  -     Magnesium; Future  -     Renal Function Panel; Future    Dyslipidemia  -     ALT; Future  -     AST; Future  -     CBC Auto Differential; Future  -     Lipid Panel; Future  -     TSH with Reflex; Future    Follow up on labs. Reviewed BS log with patient. Improved. He did get his labs drawn today. Will await HbA1c and repeat again in 3 mos. Refill as above. Continue current medical regimen. All questions answered and addressed to patient satisfaction. Patient understands and agrees to the plan. The patient was evaluated and treated today based on the osteopathic principle that each person is a unit of body, mind, and spirit, the body is capable of self-regulation, self-healing, and health maintenance and that structure and function are reciprocally interrelated. Follow-up:   Return in about 3 months (around 10/6/2021) for AMV; 40 min appt.       Mike Akhtar D.O.

## 2021-07-08 DIAGNOSIS — D61.818 PANCYTOPENIA (HCC): Primary | ICD-10-CM

## 2021-07-14 RX ORDER — LANCETS 26 GAUGE
EACH MISCELLANEOUS
COMMUNITY
End: 2021-07-14 | Stop reason: SDUPTHER

## 2021-07-14 RX ORDER — GLUCOSAMINE HCL/CHONDROITIN SU 500-400 MG
CAPSULE ORAL
Qty: 500 STRIP | Refills: 1 | Status: SHIPPED | OUTPATIENT
Start: 2021-07-14 | End: 2021-12-07 | Stop reason: SDUPTHER

## 2021-07-14 RX ORDER — LANCETS 26 GAUGE
1 EACH MISCELLANEOUS 4 TIMES DAILY
Qty: 500 EACH | Refills: 1 | Status: SHIPPED | OUTPATIENT
Start: 2021-07-14 | End: 2022-04-01 | Stop reason: SDUPTHER

## 2021-07-14 RX ORDER — GLUCOSAMINE HCL/CHONDROITIN SU 500-400 MG
1 CAPSULE ORAL
COMMUNITY
End: 2021-07-14 | Stop reason: SDUPTHER

## 2021-07-14 NOTE — TELEPHONE ENCOUNTER
Екатерина Davis is calling to request a refill on the following medication(s):    Medication Request:  Requested Prescriptions     Pending Prescriptions Disp Refills    Lancets Ultra Thin MISC 100 each 1     Sig: by Does not apply route 2 times daily    blood glucose monitor strips 100 strip 1     Si strip by Other route 2 times daily Test 2  times a day & as needed for symptoms of irregular blood glucose. Dispense sufficient amount for indicated testing frequency plus additional to accommodate PRN testing needs.        Last Visit Date (If Applicable):  9051    Next Visit Date:    10/18/2021

## 2021-07-19 ENCOUNTER — TELEPHONE (OUTPATIENT)
Dept: ONCOLOGY | Age: 71
End: 2021-07-19

## 2021-07-22 DIAGNOSIS — Z76.0 MEDICATION REFILL: ICD-10-CM

## 2021-07-22 DIAGNOSIS — K21.9 GASTROESOPHAGEAL REFLUX DISEASE WITHOUT ESOPHAGITIS: ICD-10-CM

## 2021-07-22 DIAGNOSIS — C61 PROSTATE CANCER (HCC): ICD-10-CM

## 2021-07-22 DIAGNOSIS — Z90.79 HISTORY OF ROBOT-ASSISTED LAPAROSCOPIC RADICAL PROSTATECTOMY: ICD-10-CM

## 2021-07-22 RX ORDER — ATORVASTATIN CALCIUM 40 MG/1
TABLET, FILM COATED ORAL
Qty: 90 TABLET | Refills: 0 | Status: SHIPPED | OUTPATIENT
Start: 2021-07-22 | End: 2021-08-19 | Stop reason: SDUPTHER

## 2021-07-22 RX ORDER — GABAPENTIN 100 MG/1
CAPSULE ORAL
Qty: 180 CAPSULE | Refills: 0 | Status: SHIPPED | OUTPATIENT
Start: 2021-07-22 | End: 2021-08-19 | Stop reason: SDUPTHER

## 2021-07-22 RX ORDER — FAMOTIDINE 20 MG/1
TABLET, FILM COATED ORAL
Qty: 60 TABLET | Refills: 3 | Status: SHIPPED | OUTPATIENT
Start: 2021-07-22 | End: 2021-08-05

## 2021-07-22 NOTE — TELEPHONE ENCOUNTER
Danis Miramontes is calling to request a refill on the following medication(s):    Medication Request:  Requested Prescriptions     Pending Prescriptions Disp Refills    atorvastatin (LIPITOR) 40 MG tablet [Pharmacy Med Name: Atorvastatin Calcium 40MG TABS] 90 tablet 0     Sig: TAKE 1 TABLET BY MOUTH DAILY    famotidine (PEPCID) 20 MG tablet [Pharmacy Med Name: Famotidine 20MG TABS] 60 tablet 3     Sig: TAKE 1 TABLET BY MOUTH TWICE A DAY    gabapentin (NEURONTIN) 100 MG capsule [Pharmacy Med Name: Gabapentin 100MG CAPS] 180 capsule 0     Sig: TAKE 1 CAPSULE BY MOUTH TWICE A DAY       Last Visit Date (If Applicable):  6/6/0323    Next Visit Date:    10/18/2021

## 2021-07-29 ENCOUNTER — NURSE TRIAGE (OUTPATIENT)
Dept: OTHER | Facility: CLINIC | Age: 71
End: 2021-07-29

## 2021-07-29 NOTE — TELEPHONE ENCOUNTER
Received call from Suzi at [facility]. [facility] [employer]. Brief description of triage: Patient calling for worsening swelling in both legs over the past week. He also states that he has more swelling and pain in the left lower leg. Swelling starts in his feet up to his hips. He has a small ulcerated area to left lower leg. Triage indicates for patient to go to [address] [place], [place] [address] now (or to office with PCP Approval):  2nd level triage completed with Dr Dorota Robles; provider recommends patient be seen in the ED now. Patient states understanding and will go to [facility] ED for evaluation. Care advice provided, patient verbalizes understanding; denies any other questions or concerns; instructed to call back for any new or worsening symptoms. Attention Provider: Thank you for allowing me to participate in the care of your patient. The patient was connected to triage in response to information provided to the ECC. Please do not respond through this encounter as the response is not directed to a shared pool. Reason for Disposition   SEVERE swelling (e.g., swelling extends above knee, entire leg is swollen, weeping fluid)    Answer Assessment - Initial Assessment Questions  1. ONSET: \"When did the swelling start? \" (e.g., minutes, hours, days)      Has had for swelling in both legs for months, but has worsened over the past week    2. LOCATION: \"What part of the leg is swollen? \"  \"Are both legs swollen or just one leg? \"      Swelling from foot to hip, left greater than right side    3. SEVERITY: \"How bad is the swelling? \" (e.g., localized; mild, moderate, severe)   - Localized - small area of swelling localized to one leg   - MILD pedal edema - swelling limited to foot and ankle, pitting edema < 1/4 inch (6 mm) deep, rest and elevation eliminate most or all swelling   - MODERATE edema - swelling of lower leg to knee, pitting edema > 1/4 inch (6 mm) deep, rest and elevation only partially reduce swelling   - SEVERE edema - swelling extends above knee, facial or hand swelling present       Severe    4. REDNESS: \"Does the swelling look red or infected? \"      No    5. PAIN: \"Is the swelling painful to touch? \" If so, ask: \"How painful is it? \"   (Scale 1-10; mild, moderate or severe)      Yes, left lower leg in the back    6. FEVER: \"Do you have a fever? \" If so, ask: \"What is it, how was it measured, and when did it start? \"       No    7. CAUSE: \"What do you think is causing the leg swelling? \"      Unsure    8. MEDICAL HISTORY: \"Do you have a history of heart failure, kidney disease, liver failure, or cancer? \"      Diabetes, HTN    9. RECURRENT SYMPTOM: \"Have you had leg swelling before? \" If so, ask: \"When was the last time? \" \"What happened that time? \"      Yes, years ago and didn't last long, periodically, no evaluation/treatment    10. OTHER SYMPTOMS: \"Do you have any other symptoms? \" (e.g., chest pain, difficulty breathing)        No chest pain, no difficulty breathing, small ulcerated area to left lower leg    11. PREGNANCY: \"Is there any chance you are pregnant? \" \"When was your last menstrual period? \"        n/a    Protocols used: LEG SWELLING AND EDEMA-ADULT-[place]

## 2021-07-30 ENCOUNTER — TELEPHONE (OUTPATIENT)
Dept: GASTROENTEROLOGY | Age: 71
End: 2021-07-30

## 2021-07-30 NOTE — TELEPHONE ENCOUNTER
No show on 7/28/21. Rescheduled with the patient for 11/16/21 3:30 pm @ [facility]. Placed on the cancellation list.  No show letter mailed.

## 2021-08-02 ENCOUNTER — HOSPITAL ENCOUNTER (OUTPATIENT)
Facility: MEDICAL CENTER | Age: 71
End: 2021-08-02
Payer: COMMERCIAL

## 2021-08-05 ENCOUNTER — TELEPHONE (OUTPATIENT)
Dept: FAMILY MEDICINE CLINIC | Age: 71
End: 2021-08-05

## 2021-08-05 ENCOUNTER — HOSPITAL ENCOUNTER (OUTPATIENT)
Dept: VASCULAR LAB | Age: 71
Discharge: HOME | End: 2021-08-05
Payer: COMMERCIAL

## 2021-08-05 ENCOUNTER — OFFICE VISIT (OUTPATIENT)
Dept: FAMILY MEDICINE CLINIC | Age: 71
End: 2021-08-05
Payer: COMMERCIAL

## 2021-08-05 ENCOUNTER — OFFICE VISIT (OUTPATIENT)
Dept: UROLOGY | Age: 71
End: 2021-08-05

## 2021-08-05 VITALS
DIASTOLIC BLOOD PRESSURE: 86 MMHG | SYSTOLIC BLOOD PRESSURE: 132 MMHG | OXYGEN SATURATION: 97 % | HEART RATE: 92 BPM | BODY MASS INDEX: 22.73 KG/M2 | WEIGHT: 177 LBS

## 2021-08-05 VITALS
DIASTOLIC BLOOD PRESSURE: 89 MMHG | HEART RATE: 78 BPM | HEIGHT: 74 IN | BODY MASS INDEX: 22.84 KG/M2 | SYSTOLIC BLOOD PRESSURE: 171 MMHG | WEIGHT: 178 LBS | TEMPERATURE: 96.3 F

## 2021-08-05 DIAGNOSIS — R22.42 LOCALIZED SWELLING OF LEFT LOWER EXTREMITY: Primary | ICD-10-CM

## 2021-08-05 DIAGNOSIS — R22.42 LOCALIZED SWELLING OF LEFT LOWER EXTREMITY: ICD-10-CM

## 2021-08-05 DIAGNOSIS — N39.3 STRESS INCONTINENCE OF URINE: ICD-10-CM

## 2021-08-05 DIAGNOSIS — C61 PROSTATE CANCER (HCC): Primary | ICD-10-CM

## 2021-08-05 PROCEDURE — G8420 CALC BMI NORM PARAMETERS: HCPCS | Performed by: UROLOGY

## 2021-08-05 PROCEDURE — G8420 CALC BMI NORM PARAMETERS: HCPCS | Performed by: FAMILY MEDICINE

## 2021-08-05 PROCEDURE — G8427 DOCREV CUR MEDS BY ELIG CLIN: HCPCS | Performed by: FAMILY MEDICINE

## 2021-08-05 PROCEDURE — 4040F PNEUMOC VAC/ADMIN/RCVD: CPT | Performed by: UROLOGY

## 2021-08-05 PROCEDURE — 93971 EXTREMITY STUDY: CPT

## 2021-08-05 PROCEDURE — 99213 OFFICE O/P EST LOW 20 MIN: CPT | Performed by: FAMILY MEDICINE

## 2021-08-05 PROCEDURE — 1123F ACP DISCUSS/DSCN MKR DOCD: CPT | Performed by: FAMILY MEDICINE

## 2021-08-05 PROCEDURE — 3017F COLORECTAL CA SCREEN DOC REV: CPT | Performed by: UROLOGY

## 2021-08-05 PROCEDURE — 1123F ACP DISCUSS/DSCN MKR DOCD: CPT | Performed by: UROLOGY

## 2021-08-05 PROCEDURE — 4040F PNEUMOC VAC/ADMIN/RCVD: CPT | Performed by: FAMILY MEDICINE

## 2021-08-05 PROCEDURE — 99024 POSTOP FOLLOW-UP VISIT: CPT | Performed by: UROLOGY

## 2021-08-05 PROCEDURE — 1036F TOBACCO NON-USER: CPT | Performed by: UROLOGY

## 2021-08-05 PROCEDURE — 3017F COLORECTAL CA SCREEN DOC REV: CPT | Performed by: FAMILY MEDICINE

## 2021-08-05 PROCEDURE — 1036F TOBACCO NON-USER: CPT | Performed by: FAMILY MEDICINE

## 2021-08-05 PROCEDURE — G8427 DOCREV CUR MEDS BY ELIG CLIN: HCPCS | Performed by: UROLOGY

## 2021-08-05 ASSESSMENT — PATIENT HEALTH QUESTIONNAIRE - PHQ9
SUM OF ALL RESPONSES TO PHQ QUESTIONS 1-9: 1
SUM OF ALL RESPONSES TO PHQ QUESTIONS 1-9: 1
2. FEELING DOWN, DEPRESSED OR HOPELESS: 0
SUM OF ALL RESPONSES TO PHQ9 QUESTIONS 1 & 2: 1
SUM OF ALL RESPONSES TO PHQ QUESTIONS 1-9: 1
1. LITTLE INTEREST OR PLEASURE IN DOING THINGS: 1

## 2021-08-05 ASSESSMENT — ENCOUNTER SYMPTOMS
ABDOMINAL PAIN: 0
EYE PAIN: 0
NAUSEA: 0
WHEEZING: 0
VOMITING: 0
DIARRHEA: 1
CONSTIPATION: 0
BACK PAIN: 0
SHORTNESS OF BREATH: 0
COUGH: 0
EYE REDNESS: 0

## 2021-08-05 NOTE — PROGRESS NOTES
Progress Note    Екатерина Davis is a 80 y.o.  male who presents today alone for evaluation of   Chief Complaint   Patient presents with    Leg Swelling     left leg x 2 weeks           HPI:   Patient is here for same day visit in regards to LLE swelling. Patient denies bite or trauma. Patient denies rash. Patient states it is painful and swollen. Patient denies relief with OTC analgesics. Patient states he was recently hospitalized at [facility], however he does not have any DC paperwork. Patient states his legs were swollen at the time of his hospitalization. Patient is unsure of was performed during his hospital stay. Patient denies rash or redness.     Health Maintenance Due   Topic Date Due    Hepatitis A vaccine (1 of 2 - Risk 2-dose series) Never done    Shingles Vaccine (1 of 2) Never done    Diabetic retinal exam  08/16/2017    Annual Wellness Visit (AWV)  Never done    Diabetic foot exam  11/22/2020    Low dose CT lung screening  03/24/2021        Current Medications:     Current Outpatient Medications   Medication Sig Dispense Refill    atorvastatin (LIPITOR) 40 MG tablet TAKE 1 TABLET BY MOUTH DAILY 90 tablet 0    gabapentin (NEURONTIN) 100 MG capsule TAKE 1 CAPSULE BY MOUTH TWICE A  capsule 0    insulin glargine (BASAGLAR KWIKPEN) 100 UNIT/ML injection pen Inject 15 Units into the skin nightly (Patient taking differently: Inject 15 Units into the skin 2 times daily ) 5 pen 0    insulin lispro, 1 Unit Dial, (HUMALOG KWIKPEN) 100 UNIT/ML SOPN Inject 15 Units into the skin 3 times daily (before meals) 5 pen 0    metoprolol succinate (TOPROL XL) 50 MG extended release tablet TAKE 1 TABLET BY MOUTH DAILY 90 tablet 0    NIFEdipine (PROCARDIA XL) 90 MG extended release tablet TAKE 1 TABLET BY MOUTH DAILY 90 tablet 1    ZORTRESS 1 MG TABS Take 1 mg by mouth 2 times daily      Everolimus 0.75 MG TABS 2 times daily 3 tabs 2 times daily      magnesium oxide (MAG-OX) 400 (241.3 Mg) MG TABS tablet TAKE 1 TABLET BY MOUTH TWICE A DAY 60 tablet 2    tacrolimus (PROGRAF) 1 MG capsule Take 6 mg by mouth 2 times daily       Lancets Ultra Thin MISC 1 Device by Does not apply route 4 times daily 500 each 1    blood glucose monitor strips Test blood sugar 4 times daily 500 strip 1     No current facility-administered medications for this visit. Allergies:      Allergies   Allergen Reactions    Amino Acids Nausea Only    Lisinopril Nausea Only        Medical History:     Past Medical History:   Diagnosis Date    Anemia     CAD [facility]coronary artery disease)     [facility]    CKD (chronic kidney disease) stage 4, GFR 15-29 ml/min ([facility])     COVID-19 03/2020    Elevated PSA, between 10 and less than 20 ng/ml     [facility]     Hemodialysis access, fistula mature ([facility]     right arm    Hemodialysis patient ([facility]     patient had Kidney transplant-no longer on dialysis    Hepatitis C without hepatic coma     Hx of inguinal hernia repair     Hyperlipidemia     Hypertension     Irregular heart rate     wearing holter monitor 1/29/21    Leukopenia 01/29/2021    Palpitations     Prostate cancer ([facility]     prostate    [facility] recipient [facility]/2017    [facility]    Thrombocytopenia ([facility] 01/29/2021    Type 2 diabetes mellitus ([facility]     Under care of team 05/04/2021    [facility]phrology-[facility]-last visit april 2021    Under care of team 05/04/2021    cardiology-[facility] cardiology-aleida wade-last visit onc 2020    Wellness examination 05/04/2021    pcp-Dr [facility]-last visit march 2021       Past Surgical History:   Procedure Laterality Date    CARDIAC CATHETERIZATION      Patient states normal    COLONOSCOPY N/A 10/30/2019    COLONOSCOPY WITH BIOPSY performed by Martin Berry MD at [place] [id number] Right 05/19/2021     CYSTOSCOPY URETERAL STENT INSERTION (Right )    CYSTOSCOPY Right 5/19/2021    CYSTOSCOPY performed by Grisel Da Silva About Running Out of Food in the Last Year: Never true    Ran Out of Food in the Last Year: Never true   Transportation Needs:     Lack of Transportation (Medical):  Lack of Transportation (Non-Medical):    Physical Activity:     Days of Exercise per Week:     Minutes of Exercise per Session:    Stress:     Feeling of Stress :    Social Connections:     Frequency of Communication with Friends and Family:     Frequency of Social Gatherings with Friends and Family:     Attends Christianity Services:     Active Member of Clubs or Organizations:     Attends Club or Organization Meetings:     Marital Status:    Intimate Partner Violence:     Fear of Current or Ex-Partner:     Emotionally Abused:     Physically Abused:     Sexually Abused:         ROS:     Constitutional: No fevers, chills, fatigue. ENT: No nasal congestion or sore throat  Respiratory: No difficulty in breathing or cough. Cardiovascular: No chest pain, palpitations or shortness of breath  Gastrointestinal: No abdominal pain or change in bowel movements. Genitourinary: No change in urinary frequency or dysuria. Skin: No rashes or skin lesions. Neurological: No weakness. No headaches. MSK: +LLE swelling         Last Filed Vitals:  /86   Pulse 92   Wt 177 lb (80.3 kg)   SpO2 97%   BMI 22.73 kg/m²      Physical Examination:     GENERAL APPEARANCE: in no acute distress, well developed, well nourished. HEAD: normocephalic, atraumatic. EYES: extraocular movement intact (EOMI), pupils equal, round, reactive to light and accommodation. EARS: normal, tympanic membrane intact, clear, auditory canal clear. NOSE: nares patent, no erythema, sinuses nontender bilaterally, no rhinorrhea. ORAL CAVITY: mucosa moist, no lesions. THROAT: clear, no mass, no exudate. NECK/THYROID: neck supple, full range of motion, no thyromegaly.    HEART: no murmurs, regular rate and rhythm, S1, S2 normal.   LUNGS: clear to auscultation bilaterally, no wheezes, rales, rhonchi.    ABDOMEN: normal, bowel sounds present, soft, nontender, nondistended, no rebound guarding or rigidity  MSK: 2+ pitting edema LLE    Recent Labs/ In Office Testing/ Radiograph review:     Hospital Outpatient Visit on 07/06/2021   Component Date Value Ref Range Status    ALT 07/06/2021 17  5 - 41 U/L Final    AST 07/06/2021 17  <40 U/L Final    WBC 07/06/2021 3.0* 3.5 - 11.3 k/uL Final    RBC 07/06/2021 3.03* 4.21 - 5.77 m/uL Final    Hemoglobin 07/06/2021 8.7* 13.0 - 17.0 g/dL Final    Hematocrit 07/06/2021 28.6* 40.7 - 50.3 % Final    MCV 07/06/2021 94.4  82.6 - 102.9 fL Final    MCH 07/06/2021 28.7  25.2 - 33.5 pg Final    MCHC 07/06/2021 30.4  28.4 - 34.8 g/dL Final    RDW 07/06/2021 13.2  11.8 - 14.4 % Final    Platelets 51/43/2808 101* 138 - 453 k/uL Final    MPV 07/06/2021 11.9  8.1 - 13.5 fL Final    NRBC Automated 07/06/2021 0.0  0.0 per 100 WBC Final    Differential Type 07/06/2021 NOT REPORTED   Final    Seg Neutrophils 07/06/2021 54  36 - 65 % Final    Lymphocytes 07/06/2021 24  24 - 43 % Final    Monocytes 07/06/2021 18* 3 - 12 % Final    Eosinophils % 07/06/2021 3  1 - 4 % Final    Basophils 07/06/2021 1  0 - 2 % Final    Immature Granulocytes 07/06/2021 0  0 % Final    Segs Absolute 07/06/2021 1.59  1.50 - 8.10 k/uL Final    Absolute Lymph # 07/06/2021 0.72* 1.10 - 3.70 k/uL Final    Absolute Mono # 07/06/2021 0.54  0.10 - 1.20 k/uL Final    Absolute Eos # 07/06/2021 0.10  0.00 - 0.44 k/uL Final    Basophils Absolute 07/06/2021 <0.03  0.00 - 0.20 k/uL Final    Absolute Immature Granulocyte 07/06/2021 <0.03  0.00 - 0.30 k/uL Final    WBC Morphology 07/06/2021 NOT REPORTED   Final    RBC Morphology 07/06/2021 NOT REPORTED   Final    Platelet Estimate 10/95/2442 NOT REPORTED   Final    Hemoglobin A1C 07/06/2021 8.7* 4.0 - 6.0 % Final    Estimated Avg Glucose 07/06/2021 203  mg/dL Final    Comment: The ADA and AACC recommend providing the estimated average glucose result to permit better   patient understanding of their HBA1c result.  Cholesterol 07/06/2021 175  <200 mg/dL Final    Comment:    Cholesterol Guidelines:      <200  Desirable   200-240  Borderline      >240  Undesirable         HDL 07/06/2021 43  >40 mg/dL Final    Comment:    HDL Guidelines:    <40     Undesirable   40-59    Borderline    >59     Desirable         LDL Cholesterol 07/06/2021 114  0 - 130 mg/dL Final    Comment:    LDL Guidelines:     <100    Desirable   100-129   Near to/above Desirable   130-159   Borderline      >159   Undesirable     Direct (measured) LDL and calculated LDL are not interchangeable tests.  Chol/HDL Ratio 07/06/2021 4.1  <5 Final            Triglycerides 07/06/2021 90  <150 mg/dL Final    Comment:    Triglyceride Guidelines:     <150   Desirable   150-199  Borderline   200-499  High     >499   Very high   Based on AHA Guidelines for fasting triglyceride, October 2012.          VLDL 07/06/2021 NOT REPORTED  1 - 30 mg/dL Final    Magnesium 07/06/2021 2.4  1.6 - 2.6 mg/dL Final    Glucose 07/06/2021 230* 70 - 99 mg/dL Final    BUN 07/06/2021 41* 8 - 23 mg/dL Final    CREATININE 07/06/2021 3.06* 0.70 - 1.20 mg/dL Final    Bun/Cre Ratio 07/06/2021 NOT REPORTED  9 - 20 Final    Calcium 07/06/2021 8.7  8.6 - 10.4 mg/dL Final    Albumin 07/06/2021 4.0  3.5 - 5.2 g/dL Final    Phosphorus 07/06/2021 3.5  2.5 - 4.5 mg/dL Final    Sodium 07/06/2021 142  135 - 144 mmol/L Final    Potassium 07/06/2021 5.0  3.7 - 5.3 mmol/L Final    Chloride 07/06/2021 109* 98 - 107 mmol/L Final    CO2 07/06/2021 20  20 - 31 mmol/L Final    Anion Gap 07/06/2021 13  9 - 17 mmol/L Final    GFR Non- 07/06/2021 20* >60 mL/min Final    GFR  07/06/2021 25* >60 mL/min Final    GFR Comment 07/06/2021        Final    Comment: Average GFR for 79or more years old:   76 mL/min/1.73sq m  Chronic Kidney Disease:   <60 mL/min/1.73sq m  Kidney failure:   <15 mL/min/1.73sq m              eGFR calculated using average adult body mass. Additional eGFR calculator available at:        [employer]            GFR Staging 07/06/2021 NOT REPORTED   Final       No results found for this visit on 08/05/21. Assessment/Plan:     Risa Hansen was seen today for leg swelling. Diagnoses and all orders for this visit:    Localized swelling of left lower extremity  -     US DUP LOWER EXTREMITY LEFT MAGALIE; Future    STAT US as above. Request [facility] paperwork for HDFU. ED precautions provided. All questions answered and addressed to patient satisfaction. Patient understands and agrees to the plan. The patient was evaluated and treated today based on the osteopathic principle that each person is a unit of body, mind, and spirit, the body is capable of self-regulation, self-healing, and health maintenance and that structure and function are reciprocally interrelated. Follow-up:   Return in about 1 week (around 8/12/2021) for hospital [place]; 20 min appt.       Sarita Beckett D.O.

## 2021-08-05 NOTE — TELEPHONE ENCOUNTER
----- Message from Db Desai sent at 8/5/2021  8:27 AM EDT -----  Subject: Appointment Request    Reason for Call: Routine (Patient Request) No Script    QUESTIONS  Type of Appointment? Established Patient  Reason for appointment request? Available appointments did not meet   patient need  Additional Information for Provider? Pt requesting appointment for ankle,   states hes experiencing mild swelling and painful to the touch on his left   ankle. Would like to be contacted to make appointment to get it checked   out.   ---------------------------------------------------------------------------  --------------  [address]  What is the best way for the office to contact you? OK to leave message on   voicemail  Preferred Call Back Phone Number? [phone]  ---------------------------------------------------------------------------  --------------  SCRIPT ANSWERS  Relationship to Patient? Self  (Is the patient requesting to see the provider for a procedure?)? No  (Is the patient requesting to see the provider urgently  today or   tomorrow. )? No  Have you been diagnosed with, awaiting test results for, or told that you   are suspected of having COVID-19 (Coronavirus)? (If patient has tested   negative or was tested as a requirement for work, school, or travel and   not based on symptoms, answer no)? No  Do you currently have flu-like symptoms including fever or chills, cough,   shortness of breath, difficulty breathing, or new loss of taste or smell? No  Have you had close contact with someone with COVID-19 in the last 14 days? No  (Service Expert  click yes below to proceed with [employer]Form As Usual   Scheduling)?  Yes

## 2021-08-05 NOTE — PROGRESS NOTES
[address] [address]  Dept: [address] [facility] Urology Office Note - Established    Patient:  Angel Patel  YOB: 1950  Date: 8/5/2021    The patient is a 80 y.o. male who presents todayfor evaluation of the following problems:   Chief Complaint   Patient presents with    Prostate Cancer     f/u with PSA [facility]        HPI  Has h/o prostate cancer. psa is 0.1, urinating well, no dysuria, no hematuria, has incontinence. But 4ppd. Summary of old records: N/A    Additional History: N/A    Procedures Today: N/A    Urinalysis today:  No results found for this visit on 08/05/21. Last several PSA's:  Lab Results   Component Value Date    PSA 12.22 (H) 08/14/2020    PSA 3.2 02/16/2018    PSA 2.4 05/02/2017     Last total testosterone:  No results found for: TESTOSTERONE    AUA Symptom Score (8/5/2021):   INCOMPLETE EMPTYING: How often have you had the sensation of not emptying your bladder?: Not at all  FREQUENCY: How often do you have to urinate less than every two hours?: Not at all  INTERMITTENCY: How often have you found you stopped and started again several times when you urinated?: Less than 1 to 5 times  URGENCY: How often have you found it difficult to postpone urination?: Less than 1 to 5 times  WEAK STREAM: How often have you had a weak urinary stream?: Not at all  STRAINING: How often have you had to strain to start  urination?: Not at all  NOCTURIA: How many times did you typically get up at night to uriniate?: 4 Times  TOTAL I-PSS SCORE[de-identified] 6  How would you feel if you were to spend the rest of your life with your urinary condition?: Mostly Satisfied    Last BUN and creatinine:  Lab Results   Component Value Date    BUN 41 (H) 07/06/2021     Lab Results   Component Value Date    CREATININE 3.06 (H) 07/06/2021       Additional Lab/Culture results: none    Imaging Reviewed during this Office Visit: none  (results were independently reviewed by physician and radiology report verified)    PAST MEDICAL, FAMILY AND SOCIAL HISTORY UPDATE:  Past Medical History:   Diagnosis Date    Anemia     CAD (coronary artery disease)     ([facility]    CKD (chronic kidney disease) stage 4, GFR 15-29 ml/min ([facility]     COVID-19 03/2020    Elevated PSA, between 10 and less than 20 ng/ml     End stage kidney disease ([facility]     Hemodialysis access, fistula mature ([facility]     right arm    Hemodialysis patient [facility])     patient had Kidney transplant-no longer on dialysis    Hepatitis C without hepatic coma     Hx of inguinal hernia repair     Hyperlipidemia     Hypertension     Irregular heart rate     wearing holter monitor 1/29/21    Leukopenia 01/29/2021    Palpitations     Prostate cancer ([facility]     prostate    [facility]    Thrombocytopenia ([facility] 01/29/2021    Type 2 diabetes mellitus ([facility]     Under care of team 05/04/2021    [facility]phrology-Dr [facility]age[facility]-last visit april 2021    Under care of team 05/04/2021    [facility] -last visit onc 2020    Wellness examination 05/04/2021    pcp-Dr [facility]-last visit march 2021     Past Surgical History:   Procedure Laterality Date    CARDIAC CATHETERIZATION      Patient states normal    COLONOSCOPY N/A 10/30/2019    COLONOSCOPY WITH BIOPSY performed by Keli Vazquez MD at [place] [address] Right 05/19/2021     CYSTOSCOPY URETERAL STENT INSERTION (Right )    CYSTOSCOPY Right 5/19/2021    CYSTOSCOPY performed by Ky Samayoa MD at [facility] Right 11/2015    at [facility]    left inguinal    KIDNEY TRANSPLANT  2017    right    LIVER BIOPSY      PROSTATE BIOPSY N/A 2/12/2021    PROSTATE BIOPSY WITH ULTRASOUND TO OR WITH TECH performed by Bryant Bautista MD at [address] [place] [address]  05/19/2021    XI ROBOTIC LAPAROSCOPIC PROSTATECTOMY WITH STANDARD PELVIC LYMPHNODE DISSECTION AND LEFT PELVIC LYMPHNODE DISSECTION     PROSTATECTOMY N/A 5/19/2021    XI ROBOTIC LAPAROSCOPIC PROSTATECTOMY DISSECTION performed by Gayle Stone MD [address]  2/12/2021     PROSTATE NEEDLE PUNCH 2/12/2021 [facility] ULTRASOUND     Family History   Problem Relation Age of Onset    Cancer Mother         Lymphoma    Lung Cancer Father     Colon Cancer Brother      Outpatient Medications Marked as Taking for the 8/5/21 encounter (Office Visit) with Gayle Stone MD   Medication Sig Dispense Refill    atorvastatin (LIPITOR) 40 MG tablet TAKE 1 TABLET BY MOUTH DAILY 90 tablet 0    famotidine (PEPCID) 20 MG tablet TAKE 1 TABLET BY MOUTH TWICE A DAY 60 tablet 3    gabapentin (NEURONTIN) 100 MG capsule TAKE 1 CAPSULE BY MOUTH TWICE A  capsule 0    Lancets Ultra Thin MISC 1 Device by Does not apply route 4 times daily 500 each 1    blood glucose monitor strips Test blood sugar 4 times daily 500 strip 1    insulin glargine (BASAGLAR KWIKPEN) 100 UNIT/ML injection pen Inject 15 Units into the skin nightly 5 pen 0    insulin lispro, 1 Unit Dial, (HUMALOG KWIKPEN) 100 UNIT/ML SOPN Inject 15 Units into the skin 3 times daily (before meals) 5 pen 0    metoprolol succinate (TOPROL XL) 50 MG extended release tablet TAKE 1 TABLET BY MOUTH DAILY 90 tablet 0    NIFEdipine (PROCARDIA XL) 90 MG extended release tablet TAKE 1 TABLET BY MOUTH DAILY 90 tablet 1    ZORTRESS 1 MG TABS Take 1 mg by mouth 2 times daily      Everolimus 0.75 MG TABS 2 times daily 3 tabs 2 times daily      magnesium oxide (MAG-OX) 400 (241.3 Mg) MG TABS tablet TAKE 1 TABLET BY MOUTH TWICE A DAY 60 tablet 2    tacrolimus (PROGRAF) 1 MG capsule Take 6 mg by mouth 2 times daily          Amino acids and Lisinopril  Social History     Tobacco Use   Smoking Status Former Smoker    Packs/day: 0.50    Years: 50.00    Pack years: 25.00    Types: Cigarettes    Quit date: 2013    Years since quittin.3   Smokeless Tobacco Never Used   Tobacco Comment    No smoking in at least 3 years     (Ifpatient a smoker, smoking cessation counseling offered)    Social History     Substance and Sexual Activity   Alcohol Use No    Comment: No ETOH in 3 years       REVIEW OF SYSTEMS:  Review of Systems    Physical Exam:      Vitals:    21 1025   BP: (!) 171/89   Pulse: 78   Temp: 96.3 °F (35.7 °C)     Body mass index is 22.85 kg/m². Patient is a 80 y.o. male in no acute distress and alert and oriented to person, place and time. Physical Exam  Constitutional: Patient in no acute distress. Neuro: Alert and oriented to person, place and time. Psych: Mood normal, affect normal  Skin: No rash noted  HEENT: Head: Normocephalic andatraumatic  Conjunctivae and EOM are normal. Pupils are equal, round  Nose:Normal  Right External Ear: Normal; Left External Ear: Normal  Mouth: Mucosa Moist  Neck: Supple  Lungs: Respiratory effort is normal  Cardiovascular: Warm & Pink  Abdomen: Soft, non-tender, non-distended with no CVA,  No flank tenderness,  Or hepatosplenomegaly   Lymphatics: No palpablelymphadenopathy. Assessment and Plan      1. Prostate cancer ([address]    2. Stress incontinence of urine           Plan:       No follow-ups on file. Prescriptions Ordered:  No orders of the defined types were placed in this encounter. Orders Placed:  Orders Placed This Encounter   Procedures    PSA, Diagnostic     Standing Status:   Future     Standing Expiration Date:   2022           Jenn Oh MD    Agree with the ROS entered by the MA.

## 2021-08-05 NOTE — PROGRESS NOTES
Review of Systems   Constitutional: Negative for appetite change, chills and fatigue. Eyes: Negative for pain, redness and visual disturbance. Respiratory: Negative for cough, shortness of breath and wheezing. Cardiovascular: Positive for leg swelling. Negative for chest pain. Gastrointestinal: Positive for diarrhea. Negative for abdominal pain, constipation, nausea and vomiting. Genitourinary: Negative for difficulty urinating, dysuria, flank pain, frequency, hematuria and urgency. Musculoskeletal: Negative for back pain, joint swelling and myalgias. Skin: Negative for rash and wound. Neurological: Negative for dizziness, weakness and numbness. Hematological: Does not bruise/bleed easily.

## 2021-08-13 ENCOUNTER — INITIAL CONSULT (OUTPATIENT)
Dept: ONCOLOGY | Age: 71
End: 2021-08-13
Payer: COMMERCIAL

## 2021-08-13 ENCOUNTER — TELEPHONE (OUTPATIENT)
Dept: ONCOLOGY | Age: 71
End: 2021-08-13

## 2021-08-13 ENCOUNTER — HOSPITAL ENCOUNTER (OUTPATIENT)
Facility: MEDICAL CENTER | Age: 71
Discharge: HOME | End: 2021-08-13
Payer: COMMERCIAL

## 2021-08-13 ENCOUNTER — HOSPITAL ENCOUNTER (OUTPATIENT)
Facility: MEDICAL CENTER | Age: 71
End: 2021-08-13
Payer: COMMERCIAL

## 2021-08-13 VITALS
WEIGHT: 177.6 LBS | HEART RATE: 74 BPM | SYSTOLIC BLOOD PRESSURE: 147 MMHG | DIASTOLIC BLOOD PRESSURE: 77 MMHG | TEMPERATURE: 98.3 F | HEIGHT: 74 IN | BODY MASS INDEX: 22.79 KG/M2

## 2021-08-13 DIAGNOSIS — D61.818 PANCYTOPENIA (HCC): ICD-10-CM

## 2021-08-13 DIAGNOSIS — D64.9 ANEMIA, UNSPECIFIED TYPE: ICD-10-CM

## 2021-08-13 DIAGNOSIS — Z78.9 OTHER SPECIFIED HEALTH STATUS: ICD-10-CM

## 2021-08-13 DIAGNOSIS — C61 PROSTATE CANCER (HCC): Primary | ICD-10-CM

## 2021-08-13 LAB
ABSOLUTE EOS #: 0.08 K/UL (ref 0–0.44)
ABSOLUTE IMMATURE GRANULOCYTE: 0.01 K/UL (ref 0–0.3)
ABSOLUTE LYMPH #: 0.73 K/UL (ref 1.1–3.7)
ABSOLUTE MONO #: 0.45 K/UL (ref 0.1–1.2)
ABSOLUTE RETIC #: 0.05 M/UL (ref 0.03–0.08)
BASOPHILS # BLD: 1 % (ref 0–2)
BASOPHILS ABSOLUTE: <0.03 K/UL (ref 0–0.2)
DIFFERENTIAL TYPE: ABNORMAL
EOSINOPHILS RELATIVE PERCENT: 3 % (ref 1–4)
FERRITIN: 323 UG/L (ref 30–400)
FOLATE: 10.4 NG/ML
HAPTOGLOBIN: 43 MG/DL (ref 30–200)
HCT VFR BLD CALC: 27.7 % (ref 40.7–50.3)
HEMOGLOBIN: 8.8 G/DL (ref 13–17)
IMMATURE GRANULOCYTES: 0 %
IMMATURE RETIC FRACT: 11.4 % (ref 2.7–18.3)
IRON SATURATION: 28 % (ref 20–55)
IRON: 47 UG/DL (ref 59–158)
LACTATE DEHYDROGENASE: 299 U/L (ref 135–225)
LYMPHOCYTES # BLD: 25 % (ref 24–43)
MCH RBC QN AUTO: 28.6 PG (ref 25.2–33.5)
MCHC RBC AUTO-ENTMCNC: 31.8 G/DL (ref 28.4–34.8)
MCV RBC AUTO: 89.9 FL (ref 82.6–102.9)
MONOCYTES # BLD: 16 % (ref 3–12)
NRBC AUTOMATED: 0 PER 100 WBC
PDW BLD-RTO: 13 % (ref 11.8–14.4)
PLATELET # BLD: 100 K/UL (ref 138–453)
PLATELET ESTIMATE: ABNORMAL
PMV BLD AUTO: 11.9 FL (ref 8.1–13.5)
RBC # BLD: 3.08 M/UL (ref 4.21–5.77)
RBC # BLD: ABNORMAL 10*6/UL
RETIC %: 1.6 % (ref 0.5–1.9)
RETIC HEMOGLOBIN: 33 PG (ref 28.2–35.7)
SEG NEUTROPHILS: 55 % (ref 36–65)
SEGMENTED NEUTROPHILS ABSOLUTE COUNT: 1.58 K/UL (ref 1.5–8.1)
TOTAL IRON BINDING CAPACITY: 170 UG/DL (ref 250–450)
UNSATURATED IRON BINDING CAPACITY: 123 UG/DL (ref 112–347)
VITAMIN B-12: 1569 PG/ML (ref 232–1245)
WBC # BLD: 2.9 K/UL (ref 3.5–11.3)
WBC # BLD: ABNORMAL 10*3/UL

## 2021-08-13 PROCEDURE — 82607 VITAMIN B-12: CPT

## 2021-08-13 PROCEDURE — 82746 ASSAY OF FOLIC ACID SERUM: CPT

## 2021-08-13 PROCEDURE — 86663 EPSTEIN-BARR ANTIBODY: CPT

## 2021-08-13 PROCEDURE — 88237 TISSUE CULTURE BONE MARROW: CPT

## 2021-08-13 PROCEDURE — 83550 IRON BINDING TEST: CPT

## 2021-08-13 PROCEDURE — 82668 ASSAY OF ERYTHROPOIETIN: CPT

## 2021-08-13 PROCEDURE — 86664 EPSTEIN-BARR NUCLEAR ANTIGEN: CPT

## 2021-08-13 PROCEDURE — 83540 ASSAY OF IRON: CPT

## 2021-08-13 PROCEDURE — 83615 LACTATE (LD) (LDH) ENZYME: CPT

## 2021-08-13 PROCEDURE — 99202 OFFICE O/P NEW SF 15 MIN: CPT

## 2021-08-13 PROCEDURE — 88275 CYTOGENETICS 100-300: CPT

## 2021-08-13 PROCEDURE — 36415 COLL VENOUS BLD VENIPUNCTURE: CPT

## 2021-08-13 PROCEDURE — 99204 OFFICE O/P NEW MOD 45 MIN: CPT | Performed by: INTERNAL MEDICINE

## 2021-08-13 PROCEDURE — 83010 ASSAY OF HAPTOGLOBIN QUANT: CPT

## 2021-08-13 PROCEDURE — 88271 CYTOGENETICS DNA PROBE: CPT

## 2021-08-13 PROCEDURE — 82728 ASSAY OF FERRITIN: CPT

## 2021-08-13 PROCEDURE — 86665 EPSTEIN-BARR CAPSID VCA: CPT

## 2021-08-13 PROCEDURE — G8420 CALC BMI NORM PARAMETERS: HCPCS | Performed by: INTERNAL MEDICINE

## 2021-08-13 PROCEDURE — G8427 DOCREV CUR MEDS BY ELIG CLIN: HCPCS | Performed by: INTERNAL MEDICINE

## 2021-08-13 PROCEDURE — 85025 COMPLETE CBC W/AUTO DIFF WBC: CPT

## 2021-08-13 PROCEDURE — 88185 FLOWCYTOMETRY/TC ADD-ON: CPT

## 2021-08-13 PROCEDURE — 85045 AUTOMATED RETICULOCYTE COUNT: CPT

## 2021-08-13 PROCEDURE — 88184 FLOWCYTOMETRY/ TC 1 MARKER: CPT

## 2021-08-13 NOTE — PROGRESS NOTES
Patient ID: Angel Patel, 1950, [id number], 80 y.o. Referred by : Senait Esqueda DO  Reason for consultation:   Cytopenia  Prostate cancer, diagnosed February 2021, grade group 4, Brackney 8, status post radical prostatectomy on 5/19/2021, final pathology stagingp T2 N0 M0  HISTORY OF PRESENT ILLNESS:    Oncologic History:  Angel Patel is a 80 y.o. male with a past medical history of kidney transplant for end-stage renal disease in 2017, currently on immunosuppression, history of chronic cytopenia, and recent diagnosis of prostate cancer was seen there initial consultation visit for evaluation of his pancytopenia. Patient has history of end-stage renal disease and underwent renal transplant on 8/20/2017. He is on chronic immunosuppression since then. He does have history of pancytopenia since then with low WBCs around 3000 and his platelets are around 110 K. Recently his hemoglobin dropped and noted to have lower than baseline platelet count around 100,000 therefore he was referred to hematology. Patient has history of prostate cancer diagnosed in February this year and he underwent radical prostatectomy on May/19/21 and the final surgical pathology showing T2 N0 M0. His last PSA in July was 0.1 and he is following with urologist.    Patient reports history of lymphoma in his mother. Denies any blood in stool or bleeding symptoms.     Past Medical History:   Diagnosis Date    Anemia     CAD (coronary artery disease)     ([facility])    CKD (chronic kidney disease) stage 4, GFR 15-29 ml/min ([facility])     COVID-19 03/2020    Elevated PSA, between 10 and less than 20 ng/ml     End stage kidney disease ([address]     Hemodialysis access, fistula mature ([address]     right arm    Hemodialysis patient [facility])     patient had Kidney transplant-no longer on dialysis    Hepatitis C without hepatic coma     Hx of inguinal hernia repair     Hyperlipidemia     Hypertension     Irregular heart rate Determinants of Health     Financial Resource Strain: Low Risk     Difficulty of Paying Living Expenses: Not hard at all   Food Insecurity: No Food Insecurity    Worried About Running Out of Food in the Last Year: Never true    Carlene of Food in the Last Year: Never true   Transportation Needs:     Lack of Transportation (Medical):  Lack of Transportation (Non-Medical):    Physical Activity:     Days of Exercise per Week:     Minutes of Exercise per Session:    Stress:     Feeling of Stress :    Social Connections:     Frequency of Communication with Friends and Family:     Frequency of Social Gatherings with Friends and Family:     Attends Congregation Services:     Active Member of Clubs or Organizations:     Attends Club or Organization Meetings:     Marital Status:    Intimate Partner Violence:     Fear of Current or Ex-Partner:     Emotionally Abused:     Physically Abused:     Sexually Abused:        Family History   Problem Relation Age of Onset    Cancer Mother         Lymphoma    Lung Cancer Father     Colon Cancer Brother         REVIEW OF SYSTEM:     Constitutional: No fever or chills. No night sweats, no weight loss   Eyes: No eye discharge, double vision, or eye pain   HEENT: negative for sore mouth, sore throat, hoarseness and voice change   Respiratory: negative for cough , sputum, dyspnea, wheezing, hemoptysis, chest pain   Cardiovascular: negative for chest pain, dyspnea, palpitations, orthopnea, PND   Gastrointestinal: negative for nausea, vomiting, diarrhea, constipation, abdominal pain, Dysphagia, hematemesis and hematochezia   Genitourinary: negative for frequency, dysuria, nocturia, urinary incontinence, and hematuria   Integument: negative for rash, skin lesions, bruises.    Hematologic/Lymphatic: negative for easy bruising, bleeding, lymphadenopathy, petechiae and swelling/edema   Endocrine: negative for heat or cold intolerance, tremor, weight changes, change in bowel habits and hair loss   Musculoskeletal: negative for myalgias, arthralgias, pain, joint swelling,and bone pain   Neurological: negative for headaches, dizziness, seizures, weakness, numbness       OBJECTIVE:         Vitals:    08/13/21 1147   BP: (!) 147/77   Pulse:    Temp:        PHYSICAL EXAM:   General appearance - well appearing, no in pain or distress   Mental status - alert and cooperative   Eyes - pupils equal and reactive, extraocular eye movements intact   Ears - bilateral TM's and external ear canals normal   Mouth - mucous membranes moist, pharynx normal without lesions   Neck - supple, no significant adenopathy   Lymphatics - no palpable lymphadenopathy, no hepatosplenomegaly   Chest - clear to auscultation, no wheezes, rales or rhonchi, symmetric air entry   Heart - normal rate, regular rhythm, normal S1, S2, no murmurs, rubs, clicks or gallops   Abdomen - soft, nontender, nondistended, no masses or organomegaly   Neurological - alert, oriented, normal speech, no focal findings or movement disorder noted   Musculoskeletal - no joint tenderness, deformity or swelling   Extremities - peripheral pulses normal, no pedal edema, no clubbing or cyanosis   Skin - normal coloration and turgor, no rashes, no suspicious skin lesions noted ,      LABORATORY DATA:     Lab Results   Component Value Date    WBC 3.0 (L) 07/06/2021    HGB 8.7 (L) 07/06/2021    HCT 28.6 (L) 07/06/2021    MCV 94.4 07/06/2021     (L) 07/06/2021    LYMPHOPCT 24 07/06/2021    RBC 3.03 (L) 07/06/2021    MCH 28.7 07/06/2021    MCHC 30.4 07/06/2021    RDW 13.2 07/06/2021    NEUTOPHILPCT 70.3 12/27/2018    MONOPCT 18 (H) 07/06/2021    BASOPCT 1 07/06/2021    NEUTROABS 1.59 07/06/2021    LYMPHSABS 0.72 (L) 07/06/2021    MONOSABS 0.54 07/06/2021    EOSABS 0.10 07/06/2021    BASOSABS <0.03 07/06/2021         Chemistry        Component Value Date/Time     07/06/2021 1005    K 5.0 07/06/2021 1005     (H) 07/06/2021 1005    CO2 20 07/06/2021 1005    BUN 41 (H) 07/06/2021 1005    CREATININE 3.06 (H) 07/06/2021 1005        Component Value Date/Time    CALCIUM 8.7 07/06/2021 1005    ALKPHOS 111 03/24/2020 0558    AST 17 07/06/2021 1005    ALT 17 07/06/2021 1005    BILITOT 0.46 03/24/2020 0558            PATHOLOGY DATA:   Surgical Pathology Report  Surgical Pathology  Collected: 02/12/21 0826   Lab status: Final   Resulting lab: [facility]   Value: [address]   [address]   [phone]   Fax: [phone]   SURGICAL PATHOLOGY REPORT     Patient Name: Mona Quintanilla   MR#: [id number]   Specimen #[id number]         Final Diagnosis     SPECIMEN \"A\":  PROSTATE, BASE LEFT MEDIAL, ULTRASOUND-GUIDED CORE   BIOPSY:          PROSTATIC ADENOCARCINOMA, ACINAR TYPE, GRADE GROUP 4, ABDULLAHI   SCORE (4+4=8)     TUMOR INVOLVES ONE-OF-ONE CORE, MEASURES 1 MM, COMPRISES 10% OF THE   PROSTATE TISSUE     FOCAL PERINEURAL INVASION PRESENT     SPECIMEN \"B\":  PROSTATE, BASE LEFT LATERAL, ULTRASOUND-GUIDED CORE   BIOPSY:          PROSTATIC ADENOCARCINOMA, ACINAR TYPE, GRADE GROUP 1, ABDULLAHI   SCORE (3+3=6)     TUMOR PRESENT IN ONE-OF-ONE CORE, MEASURES 2 MM AND COMPRISES 25% OF   THE PROSTATE TISSUE     SPECIMEN \"C\":  PROSTATE, MID LEFT MEDIAL, ULTRASOUND-GUIDED CORE   BIOPSY:          PROSTATIC ADENOCARCINOMA, ACINAR TYPE, GRADE GROUP 1, ABDULLAHI   SCORE (3+3=6)     TUMOR PRESENT IN ONE-OF-ONE CORE, MEASURES 4 MM AND COMPRISES 35% OF   THE PROSTATE TISSUE     BENIGN COLON TISSUE     SPECIMEN \"D\":  PROSTATE, MID LEFT LATERAL, ULTRASOUND-GUIDED CORE   BIOPSY:          PROSTATIC ADENOCARCINOMA, ACINAR TYPE, GRADE GROUP 2, ABDULLAHI   SCORE (3+4=7)     TUMOR INVOLVES ONE-OF-ONE CORE, MEASURES 3 MM AND COMPRISES 38% OF THE   PROSTATE TISSUE     PERINEURAL INVASION PRESENT     SPECIMEN \"E\":  PROSTATE, APEX LEFT MEDIAL, ULTRASOUND-GUIDED CORE   BIOPSY:          PROSTATIC ADENOCARCINOMA, ACINAR TYPE, GRADE GROUP 1, ZORAIDA   SCORE (3+3=6)     TUMOR PRESENT IN ONE-OF-ONE CORE, MEASURES 2.5 MM AND COMPRISES 45% OF   THE PROSTATE TISSUE     PERINEURAL INVASION PRESENT     BENIGN COLON TISSUE     SPECIMEN \"F\":  PROSTATE, APEX LEFT LATERAL, ULTRASOUND-GUIDED CORE   BIOPSY:          PROSTATIC ADENOCARCINOMA, GRADE GROUP 1, ZORAIDA SCORE (3+3=6)     TUMOR PRESENT IN ONE-OF-ONE CORE, MEASURES 5 MM AND COMPRISES 77% OF   THE PROSTATE TISSUE     BENIGN COLON TISSUE     SPECIMEN \"G\":  PROSTATE, BASE RIGHT MEDIAL, ULTRASOUND-GUIDED CORE   BIOPSY:          BENIGN PROSTATE AND COLON TISSUE     SPECIMEN \"H\":  PROSTATE, BASE RIGHT LATERAL, ULTRASOUND-GUIDED CORE   BIOPSY:          BENIGN PROSTATE AND COLON TISSUE     SPECIMEN \"I\":  PROSTATE, MID RIGHT MEDIAL, ULTRASOUND-GUIDED CORE   BIOPSY:          ATYPICAL SMALL ACINAR PROLIFERATION (ASAP) AND HIGH-GRADE   PROSTATIC INTRAEPITHELIAL NEOPLASIA (PIN)     SPECIMEN \"J\":  PROSTATE, MID RIGHT LATERAL, ULTRASOUND-GUIDED CORE   BIOPSY:          HIGH-GRADE PROSTATIC INTRAEPITHELIAL NEOPLASIA (PIN)     BENIGN COLON TISSUE     SPECIMEN \"K\":  PROSTATE, APEX RIGHT MEDIAL, ULTRASOUND-GUIDED CORE   BIOPSY:          PROSTATIC ADENOCARCINOMA, ACINAR TYPE, GRADE GROUP 2, ZORAIDA   SCORE (3+4=7)     TUMOR PRESENT IN ONE-OF-ONE CORE, MEASURES 1.5 MM AND COMPRISES 17% OF   THE PROSTATE TISSUE     PERINEURAL INVASION PRESENT     BENIGN COLON TISSUE     SPECIMEN \"L\":  PROSTATE, APEX RIGHT LATERAL, ULTRASOUND-GUIDED CORE   BIOPSY:          HIGH-GRADE PROSTATIC INTRAEPITHELIAL NEOPLASIA (PIN)      Surgical Pathology Report  Surgical Pathology  Collected: 05/19/21 1100   Lab status: Final   Resulting lab: Somewhere   Value: -- Diagnosis --     Prostate, radical prostatectomy:          Adenocarcinoma, Zoraida score 3+4 = 7. Tumor is bilateral and confined to the prostate.      Margins are free of involvement by tumor.       CNYDI Hinojosa        Clinical Information Prostate carcinoma     Source of Specimen   1: PROSTATE AND SEMINAL VESICLES     Gross Description   \"CARMEN GARCIA, PROSTATE AND SEMINAL VESICLES\" 63 gram, 4.8 x 5.4 x   4.5 cm (L x W x H) prostate with attached intact seminal vesicles and   vasa deferentia.  Separate are fragments of fat, 3.5 x 3.0 x 1.5 cm in   aggregate.  The external surfaces of the prostate are ragged pink-tan   with the right half inked blue, left half black and anterior aspect   tagged green.  Sectioning reveals ill-defined induration bilaterally   near the apex and also in the region of the base of the seminal   vesicles.  Periurethral nodules are present.  The seminal vesicles and   vasa deferentia are unremarkable.  Within the fat, is a 0.4 cm nodule.    Cassette summary:  \"A-B\" apex margin perpendicular, \"C-E\" cross   section, \"F-K\" rim sections, \"L-N\" base of seminal vesicles, \"O-R\"   bladder neck margin perpendicular, \"S\" seminal vesicles and vasa   deferentia margins, \"T\" representative separate fat including nodule. tm       Microscopic Description       PROCEDURE: Radical prostatectomy             HISTOLOGIC TYPE: Adenocarcinoma, acinar       HISTOLOGIC GRADE (ZORAIDA SCORE): Zoraida score 3+4 = 7     GRADE GROUP (ISUP / WHO): Grade group 2 (of 5). PERCENT PATTERN 4 IN ZORAIDA 7: 40%   TERTIARY PATTERN 5 (LESS THAN 5%) IN     OVERALL ZORAIDA SCORE 7 (IF APPLICABLE): N/A                         TUMOR QUANTITATION: Tumor is bilateral and confined to the prostate. Tumor is mostly on the left and the largest focus measured from slide   in the left lobe is 15 mm.  Largest focus in the right lobe is 5 mm. There is perineural invasion.  There is lymphovascular invasion.       EXTRAPROSTATIC EXTENSION (pT3a): Absent                     URINARY BLADDER NECK INVASION (pT3a): Absent               SEMINAL VESICLE INVASION (MUSCLE WALL) (pT3b):  Absent       MARGINS (LOCATION IF POSITIVE): Free of carcinoma     TREATMENT EFFECT ON CARCINOMA: Absent (history of therapy not   provided).         REGIONAL LYMPH NODES   -NUMBER OF LYMPH NODES EXAMINED: Single periprostatic lymph node,   small       -NUMBER WITH METASTASIS: 0                           DISTANT METASTASIS (pM only if confirmed in this case): N/A     PATHOLOGIC STAGE CLASSIFICATION:     pT2  pN0          IMAGING DATA:    Bone scan 3/9/2021  Impression   No evidence of skeletal metastatic disease.       CT pelvis 3/9/2021  Non contrasted study without evidence extraprostatic spread known prostate   cancer.  No pelvic adenopathy.  No pelvic bony mets.       Enlarged prostate with calcifications.       Incompletely distended urinary bladder with mural prominence anteriorly. Correlate for mild cystitis.       Right pelvic renal transplant.       Considerable retained stool suggesting some degree constipation with minimal   liquid stool right colon.  Correlate clinically. ASSESSMENT:    Manuel Siddiqui is a 80 y.o. male with renal transplant, chronic immunosuppression, prostate cancer and chronic pancytopenia. Pancytopenia: Patient has had low platelets and WBCs for past 4 to 5 years. His platelets seems to have slightly lower than his baseline. However his hemoglobin has dropped recently. He denies any bleeding symptoms. I will order anemia work-up and get, peripheral blood smear, flow cytometry and FISH testing. Prostate cancer: Appears in remission. He has a radical prostatectomy in May this year. And recent PSA appears low at 0.1  His bone scan and CT scan were negative for metastasis. Status post renal transplant: Following with urologist  Chronic immunosuppression  Chronic kidney disease: Following with nephrologist  Patient's questions were sought and answered results from negative present the plan.    PLAN:   I will order anemia work-up for hemolysis, iron deficiency and B12 levels  I will check peripheral blood smear, check FISH and flow cytometry  His anemia and pancytopenia possibly from his renal disease and chronic immunosuppression  Return to clinic in 2 weeks to discuss lab results and further recommendations      Stephen Freed MD  Hematologist/Medical Oncologist      This note is created with the assistance of a speech recognition program.  While intending to generate a document that actually reflects the content of the visit, the document can still have some errors including those of syntax and sound a like substitutions which may escape proof reading. It such instances, actual meaning can be extrapolated by contextual diversion.     CC:  Kandi Brizuela, DO

## 2021-08-13 NOTE — TELEPHONE ENCOUNTER
[facility] HERE FOR CONSULTATION  LABS TODAY  RV IN 2 WKS  LABS CDP, FE TIBC, FERRITIN,FISH,FLOW CYTOMETRY, LDH, PATH REVIEW SMEAR, HAPTOGLOBIN, RETICULOCYTES, ERYTHROPOIETIN, HARJEET-BARR VIRUS VCA ANTIBODY PANEL, VITAMIN B 12 & FOLATE DONE ON EXIT   MD VISIT 8/25/21 @ 1:15PM  AVS PRINTED W/ INSTRUCTIONS AND GIVEN TO PT ON EXIT

## 2021-08-15 LAB — ERYTHROPOIETIN: 17 MU/ML (ref 4–27)

## 2021-08-16 LAB
EBV EARLY ANTIGEN IGG: 31 U/ML
EBV INTERPRETATION: ABNORMAL
EBV NUCLEAR AG AB: 244 U/ML
EPSTEIN-BARR VCA IGG: 964 U/ML
EPSTEIN-BARR VCA IGM: 10 U/ML

## 2021-08-17 LAB
FLOW CYTOMETRY BL: NORMAL
SURGICAL PATHOLOGY REPORT: NORMAL

## 2021-08-18 LAB — MOLECULAR CYTOGENIC FISH: NORMAL

## 2021-08-19 ENCOUNTER — HOSPITAL ENCOUNTER (OUTPATIENT)
Facility: MEDICAL CENTER | Age: 71
End: 2021-08-19
Payer: COMMERCIAL

## 2021-08-19 DIAGNOSIS — E11.65 TYPE 2 DIABETES MELLITUS WITH HYPERGLYCEMIA, WITH LONG-TERM CURRENT USE OF INSULIN (HCC): ICD-10-CM

## 2021-08-19 DIAGNOSIS — K21.9 GASTROESOPHAGEAL REFLUX DISEASE WITHOUT ESOPHAGITIS: ICD-10-CM

## 2021-08-19 DIAGNOSIS — R00.2 PALPITATION: ICD-10-CM

## 2021-08-19 DIAGNOSIS — Z79.4 TYPE 2 DIABETES MELLITUS WITH HYPERGLYCEMIA, WITH LONG-TERM CURRENT USE OF INSULIN (HCC): ICD-10-CM

## 2021-08-19 DIAGNOSIS — I10 HTN, GOAL BELOW 130/80: ICD-10-CM

## 2021-08-19 DIAGNOSIS — Z76.0 MEDICATION REFILL: ICD-10-CM

## 2021-08-19 RX ORDER — PEN NEEDLE, DIABETIC 30 GX3/16"
4 NEEDLE, DISPOSABLE MISCELLANEOUS 4 TIMES DAILY
Qty: 350 EACH | Refills: 5 | Status: SHIPPED | OUTPATIENT
Start: 2021-08-19 | End: 2021-10-14 | Stop reason: SDUPTHER

## 2021-08-19 RX ORDER — INSULIN GLARGINE 100 [IU]/ML
15 INJECTION, SOLUTION SUBCUTANEOUS NIGHTLY
Qty: 5 PEN | Refills: 0 | Status: SHIPPED | OUTPATIENT
Start: 2021-08-19 | End: 2021-10-14 | Stop reason: SDUPTHER

## 2021-08-19 RX ORDER — NIFEDIPINE 90 MG/1
90 TABLET, EXTENDED RELEASE ORAL DAILY
Qty: 90 TABLET | Refills: 1 | Status: SHIPPED | OUTPATIENT
Start: 2021-08-19

## 2021-08-19 RX ORDER — GLUCOSAMINE HCL/CHONDROITIN SU 500-400 MG
CAPSULE ORAL
Qty: 350 STRIP | Refills: 5 | Status: SHIPPED | OUTPATIENT
Start: 2021-08-19 | End: 2022-04-01 | Stop reason: SDUPTHER

## 2021-08-19 RX ORDER — INSULIN LISPRO 100 [IU]/ML
15 INJECTION, SOLUTION INTRAVENOUS; SUBCUTANEOUS
Qty: 5 PEN | Refills: 0 | Status: SHIPPED | OUTPATIENT
Start: 2021-08-19 | End: 2021-10-14 | Stop reason: SDUPTHER

## 2021-08-19 RX ORDER — GABAPENTIN 100 MG/1
100 CAPSULE ORAL 3 TIMES DAILY
Qty: 180 CAPSULE | Refills: 0 | Status: SHIPPED | OUTPATIENT
Start: 2021-08-19 | End: 2022-05-16 | Stop reason: SDUPTHER

## 2021-08-19 RX ORDER — METOPROLOL SUCCINATE 50 MG/1
50 TABLET, EXTENDED RELEASE ORAL DAILY
Qty: 90 TABLET | Refills: 1 | Status: SHIPPED | OUTPATIENT
Start: 2021-08-19

## 2021-08-19 RX ORDER — FAMOTIDINE 20 MG/1
20 TABLET, FILM COATED ORAL 2 TIMES DAILY
Qty: 60 TABLET | Refills: 5 | Status: SHIPPED | OUTPATIENT
Start: 2021-08-19 | End: 2022-05-16 | Stop reason: SDUPTHER

## 2021-08-19 RX ORDER — EVEROLIMUS 1 MG/1
1 TABLET ORAL 2 TIMES DAILY
Qty: 60 TABLET | Refills: 5 | OUTPATIENT
Start: 2021-08-19

## 2021-08-19 RX ORDER — LANCETS 30 GAUGE
1 EACH MISCELLANEOUS 4 TIMES DAILY
Qty: 600 EACH | Refills: 5 | Status: SHIPPED | OUTPATIENT
Start: 2021-08-19

## 2021-08-19 RX ORDER — BLOOD PRESSURE TEST KIT
1 KIT MISCELLANEOUS 4 TIMES DAILY
Qty: 100 EACH | Refills: 5 | Status: SHIPPED | OUTPATIENT
Start: 2021-08-19

## 2021-08-19 RX ORDER — ATORVASTATIN CALCIUM 40 MG/1
TABLET, FILM COATED ORAL
Qty: 90 TABLET | Refills: 0 | Status: SHIPPED | OUTPATIENT
Start: 2021-08-19 | End: 2022-03-23

## 2021-08-19 NOTE — TELEPHONE ENCOUNTER
Lashanda Jones is calling to request a refill on the following medication(s):    Medication Request:  Requested Prescriptions     Pending Prescriptions Disp Refills    atorvastatin (LIPITOR) 40 MG tablet 90 tablet 0       Last Visit Date (If Applicable):  2/1/4195    Next Visit Date:    10/18/2021

## 2021-08-25 ENCOUNTER — OFFICE VISIT (OUTPATIENT)
Dept: ONCOLOGY | Age: 71
End: 2021-08-25
Payer: COMMERCIAL

## 2021-08-25 ENCOUNTER — TELEPHONE (OUTPATIENT)
Dept: ONCOLOGY | Age: 71
End: 2021-08-25

## 2021-08-25 VITALS
WEIGHT: 170.9 LBS | HEART RATE: 72 BPM | DIASTOLIC BLOOD PRESSURE: 72 MMHG | SYSTOLIC BLOOD PRESSURE: 140 MMHG | BODY MASS INDEX: 21.94 KG/M2 | RESPIRATION RATE: 18 BRPM | TEMPERATURE: 97.8 F

## 2021-08-25 DIAGNOSIS — C61 PROSTATE CANCER (HCC): ICD-10-CM

## 2021-08-25 DIAGNOSIS — D64.9 ANEMIA, UNSPECIFIED TYPE: ICD-10-CM

## 2021-08-25 DIAGNOSIS — D61.818 PANCYTOPENIA (HCC): Primary | ICD-10-CM

## 2021-08-25 PROCEDURE — G8420 CALC BMI NORM PARAMETERS: HCPCS | Performed by: INTERNAL MEDICINE

## 2021-08-25 PROCEDURE — 1123F ACP DISCUSS/DSCN MKR DOCD: CPT | Performed by: INTERNAL MEDICINE

## 2021-08-25 PROCEDURE — 99214 OFFICE O/P EST MOD 30 MIN: CPT | Performed by: INTERNAL MEDICINE

## 2021-08-25 PROCEDURE — 3017F COLORECTAL CA SCREEN DOC REV: CPT | Performed by: INTERNAL MEDICINE

## 2021-08-25 PROCEDURE — 1036F TOBACCO NON-USER: CPT | Performed by: INTERNAL MEDICINE

## 2021-08-25 PROCEDURE — 99211 OFF/OP EST MAY X REQ PHY/QHP: CPT | Performed by: INTERNAL MEDICINE

## 2021-08-25 PROCEDURE — 4040F PNEUMOC VAC/ADMIN/RCVD: CPT | Performed by: INTERNAL MEDICINE

## 2021-08-25 PROCEDURE — G8427 DOCREV CUR MEDS BY ELIG CLIN: HCPCS | Performed by: INTERNAL MEDICINE

## 2021-08-25 RX ORDER — SODIUM BICARBONATE 650 MG/1
650 TABLET ORAL 2 TIMES DAILY
COMMUNITY
End: 2022-05-16

## 2021-08-25 RX ORDER — BUMETANIDE 2 MG/1
2 TABLET ORAL DAILY
COMMUNITY
Start: 2021-08-06 | End: 2022-04-01

## 2021-08-25 NOTE — PROGRESS NOTES
Patient ID: Anne Garcia, 1950, [id number], 80 y.o. Referred by : David Osborne DO  Reason for consultation:   Cytopenia  Prostate cancer, diagnosed February 2021, grade group 4, [place] 8, status post radical prostatectomy on 5/19/2021, final pathology stagingp T2 N0 M0  S/p Renal transplant on 8/20/2017. HISTORY OF PRESENT ILLNESS:    Oncologic History:  Anne Garcia is a 80 y.o. male with a past medical history of kidney transplant for end-stage renal disease in 2017, currently on immunosuppression, history of chronic cytopenia, and recent diagnosis of prostate cancer was seen there initial consultation visit for evaluation of his pancytopenia. Patient has history of end-stage renal disease and underwent renal transplant on 8/20/2017. He is on chronic immunosuppression since then. He does have history of pancytopenia since then with low WBCs around 3000 and his platelets are around 110 K. Recently his hemoglobin dropped and noted to have lower than baseline platelet count around 100,000 therefore he was referred to hematology. Patient has history of prostate cancer diagnosed in February this year and he underwent radical prostatectomy on May/19/21 and the final surgical pathology showing T2 N0 M0. His last PSA in July was 0.1 and he is following with urologist.    Patient reports history of lymphoma in his mother. Denies any blood in stool or bleeding symptoms. Interval history:    Patient is return for follow-up visit and to discuss lab results and further recommendations. His FISH, flow cytometry is negative. His peripheral blood smear did not show any morphologic abnormalities. He denies any chest pain, shortness of breath, recurrent infection. During this visit patient's allergy, social, medical, surgical history and medications were reviewed and updated.     Past Medical History:   Diagnosis Date    Anemia     CAD (coronary artery disease)     ([address])    CKD (chronic kidney disease) stage 4, GFR 15-29 ml/min (HCC)     COVID-19 03/2020    Elevated PSA, between 10 and less than 20 ng/ml     [facility] stage [facility]     Hemodialysis access, fistula mature ([facility]     right arm    Hemodialysis patient [facility])     patient had Kidney transplant-no longer on dialysis    Hepatitis C without hepatic coma     Hx of inguinal hernia repair     Hyperlipidemia     Hypertension     Irregular heart rate     wearing holter monitor 1/29/21    Leukopenia 01/29/2021    Palpitations     Prostate cancer ([facility]     prostate    Renal transplant recipient [facility]/20/2017    [facility]    Thrombocytopenia ([facility] 01/29/2021    Type 2 diabetes mellitus [facility]     Under care of team 05/04/2021    nephrology-[facility]-last visit april 2021    Under care of team 05/04/2021    cardiology-[facility] -last visit onc 2020    Wellness examination 05/04/2021    pcp-Dr [facility]-last visit march 2021       Past Surgical History:   Procedure Laterality Date    CARDIAC CATHETERIZATION      Patient states normal    COLONOSCOPY N/A 10/30/2019    COLONOSCOPY WITH BIOPSY performed by Sarah Lucero MD at [place] [address] Right 05/19/2021     CYSTOSCOPY URETERAL STENT INSERTION (Right )    CYSTOSCOPY Right 5/19/2021    CYSTOSCOPY performed by Yin Kidd MD at [address] Right 11/2015    at [facility] [place] [address]    left inguinal    KIDNEY TRANSPLANT  2017    right    LIVER BIOPSY      PROSTATE BIOPSY N/A 2/12/2021    PROSTATE BIOPSY WITH ULTRASOUND TO OR WITH TECH performed by Claire Goodwin MD at [address] [place] [address]  05/19/2021    XI ROBOTIC LAPAROSCOPIC PROSTATECTOMY WITH STANDARD PELVIC LYMPHNODE DISSECTION AND LEFT PELVIC LYMPHNODE DISSECTION     PROSTATECTOMY N/A 5/19/2021    XI ROBOTIC LAPAROSCOPIC PROSTATECTOMY DISSECTION performed by Yin Kidd MD at [address] [place] [address] 2/12/2021    US PROSTATE NEEDLE PUNCH 2/12/2021 [facility] ULTRASOUND       Allergies   Allergen Reactions    Amino Acids Nausea Only    Lisinopril Nausea Only       Current Outpatient Medications   Medication Sig Dispense Refill    atorvastatin (LIPITOR) 40 MG tablet TAKE 1 TABLET MY MOUTH DAILY 90 tablet 0    insulin glargine (BASAGLAR KWIKPEN) 100 UNIT/ML injection pen Inject 15 Units into the skin nightly 5 pen 0    Alcohol Swabs PADS 1 box by Does not apply route 4 times daily 100 each 5    Insulin Pen Needle (PEN NEEDLES) 32G X 4 MM MISC 4 pens by Does not apply route 4 times daily 350 each 5    gabapentin (NEURONTIN) 100 MG capsule Take 1 capsule by mouth 3 times daily for 30 days. (Patient taking differently: Take 100 mg by mouth 2 times daily. ) 180 capsule 0    metoprolol succinate (TOPROL XL) 50 MG extended release tablet Take 1 tablet by mouth daily 90 tablet 1    NIFEdipine (PROCARDIA XL) 90 MG extended release tablet Take 1 tablet by mouth daily 90 tablet 1    insulin lispro, 1 Unit Dial, (HUMALOG KWIKPEN) 100 UNIT/ML SOPN Inject 15 Units into the skin 3 times daily (before meals) 5 pen 0    blood glucose monitor strips Test 4 times a day & as needed for symptoms of irregular blood glucose. Dispense sufficient amount for indicated testing frequency plus additional to accommodate PRN testing needs.  350 strip 5    Lancets MISC 1 each by Does not apply route 4 times daily 600 each 5    Lancets Ultra Thin MISC 1 Device by Does not apply route 4 times daily 500 each 1    blood glucose monitor strips Test blood sugar 4 times daily 500 strip 1    ZORTRESS 1 MG TABS Take 1 mg by mouth 2 times daily      Everolimus 0.75 MG TABS 2 times daily 3 tabs 2 times daily      magnesium oxide (MAG-OX) 400 (241.3 Mg) MG TABS tablet TAKE 1 TABLET BY MOUTH TWICE A DAY 60 tablet 2    tacrolimus (PROGRAF) 1 MG capsule Take 6 mg by mouth 2 times daily       bumetanide (BUMEX) 2 MG tablet Take 2 mg by mouth 2 times daily       sodium bicarbonate 650 MG tablet 650 mg 2 times daily       famotidine (PEPCID) 20 MG tablet Take 1 tablet by mouth 2 times daily 60 tablet 5     No current facility-administered medications for this visit. Social History     Socioeconomic History    Marital status:      Spouse name: Not on file    Number of children: Not on file    Years of education: Not on file    Highest education level: Not on file   Occupational History    Not on file   Tobacco Use    Smoking status: Former Smoker     Packs/day: 0.50     Years: 50.00     Pack years: 25.00     Types: Cigarettes     Quit date: 2013     Years since quittin.4    Smokeless tobacco: Never Used    Tobacco comment: No smoking in at least 3 years   Vaping Use    Vaping Use: Never used   Substance and Sexual Activity    Alcohol use: No     Comment: No ETOH in 3 years    Drug use: Not Currently     Types: Other-see comments     Comment: Hx of marijuana and crack cocaine previously - last use was about 3 years ago as of 22    Sexual activity: Never   Other Topics Concern    Not on file   Social History Narrative    Not on file     Social Determinants of Health     Financial Resource Strain: Low Risk     Difficulty of Paying Living Expenses: Not hard at all   Food Insecurity: No Food Insecurity    Worried About [address] in the Last Year: Never true    [address] in the Last Year: Never true   Transportation Needs:     Lack of Transportation (Medical):      Lack of Transportation (Non-Medical):    Physical Activity:     Days of Exercise per Week:     Minutes of Exercise per Session:    Stress:     Feeling of Stress :    Social Connections:     Frequency of Communication with Friends and Family:     Frequency of Social Gatherings with Friends and Family:     Attends Christian Services:     Active Member of Clubs or Organizations:     Attends Club or Organization Meetings:     Marital Status: Intimate Partner Violence:     Fear of Current or Ex-Partner:     Emotionally Abused:     Physically Abused:     Sexually Abused:        Family History   Problem Relation Age of Onset    Cancer Mother         Lymphoma    Lung Cancer Father     Colon Cancer Brother         REVIEW OF SYSTEM:     Constitutional: No fever or chills. No night sweats, no weight loss   Eyes: No eye discharge, double vision, or eye pain   HEENT: negative for sore mouth, sore throat, hoarseness and voice change   Respiratory: negative for cough , sputum, dyspnea, wheezing, hemoptysis, chest pain   Cardiovascular: negative for chest pain, dyspnea, palpitations, orthopnea, PND   Gastrointestinal: negative for nausea, vomiting, diarrhea, constipation, abdominal pain, Dysphagia, hematemesis and hematochezia   Genitourinary: negative for frequency, dysuria, nocturia, urinary incontinence, and hematuria   Integument: negative for rash, skin lesions, bruises.    Hematologic/Lymphatic: negative for easy bruising, bleeding, lymphadenopathy, petechiae and swelling/edema   Endocrine: negative for heat or cold intolerance, tremor, weight changes, change in bowel habits and hair loss   Musculoskeletal: negative for myalgias, arthralgias, pain, joint swelling,and bone pain   Neurological: negative for headaches, dizziness, seizures, weakness, numbness       OBJECTIVE:         Vitals:    08/25/21 1317   BP: (!) 140/72   Pulse: 72   Resp:    Temp:        PHYSICAL EXAM:   General appearance - well appearing, no in pain or distress   Mental status - alert and cooperative   Eyes - pupils equal and reactive, extraocular eye movements intact   Ears - bilateral TM's and external ear canals normal   Mouth - mucous membranes moist, pharynx normal without lesions   Neck - supple, no significant adenopathy   Lymphatics - no palpable lymphadenopathy, no hepatosplenomegaly   Chest - clear to auscultation, no wheezes, rales or rhonchi, symmetric air entry Heart - normal rate, regular rhythm, normal S1, S2, no murmurs, rubs, clicks or gallops   Abdomen - soft, nontender, nondistended, no masses or organomegaly   Neurological - alert, oriented, normal speech, no focal findings or movement disorder noted   Musculoskeletal - no joint tenderness, deformity or swelling   Extremities - peripheral pulses normal, no pedal edema, no clubbing or cyanosis   Skin - normal coloration and turgor, no rashes, no suspicious skin lesions noted ,      LABORATORY DATA:     Lab Results   Component Value Date    WBC 2.9 (L) 08/13/2021    HGB 8.8 (L) 08/13/2021    HCT 27.7 (L) 08/13/2021    MCV 89.9 08/13/2021     (L) 08/13/2021    LYMPHOPCT 25 08/13/2021    RBC 3.08 (L) 08/13/2021    MCH 28.6 08/13/2021    MCHC 31.8 08/13/2021    RDW 13.0 08/13/2021    NEUTOPHILPCT 70.3 12/27/2018    MONOPCT 16 (H) 08/13/2021    BASOPCT 1 08/13/2021    NEUTROABS 1.58 08/13/2021    LYMPHSABS 0.73 (L) 08/13/2021    MONOSABS 0.45 08/13/2021    EOSABS 0.08 08/13/2021    BASOSABS <0.03 08/13/2021         Chemistry        Component Value Date/Time     07/06/2021 1005    K 5.0 07/06/2021 1005     (H) 07/06/2021 1005    CO2 20 07/06/2021 1005    BUN 41 (H) 07/06/2021 1005    CREATININE 3.06 (H) 07/06/2021 1005        Component Value Date/Time    CALCIUM 8.7 07/06/2021 1005    ALKPHOS 111 03/24/2020 0558    AST 17 07/06/2021 1005    ALT 17 07/06/2021 1005    BILITOT 0.46 03/24/2020 0558            PATHOLOGY DATA:   Surgical Pathology Report  Surgical Pathology  Collected: 02/12/21 0826   Lab status: Final   Resulting lab: [facility]   Value: [address]   [address], [place] [address]   [phone]   Fax: [phone]   SURGICAL PATHOLOGY REPORT     Patient Name: Sim Dias   MR#: [id number]   Specimen #[id number]         Final Diagnosis     SPECIMEN \"A\":  PROSTATE, BASE LEFT MEDIAL, ULTRASOUND-GUIDED CORE   BIOPSY:      PROSTATIC ADENOCARCINOMA, ACINAR TYPE, GRADE GROUP 4, ABDULLAHI   SCORE (4+4=8)     TUMOR INVOLVES ONE-OF-ONE CORE, MEASURES 1 MM, COMPRISES 10% OF THE   PROSTATE TISSUE     FOCAL PERINEURAL INVASION PRESENT     SPECIMEN \"B\":  PROSTATE, BASE LEFT LATERAL, ULTRASOUND-GUIDED CORE   BIOPSY:          PROSTATIC ADENOCARCINOMA, ACINAR TYPE, GRADE GROUP 1, ABDULLAHI   SCORE (3+3=6)     TUMOR PRESENT IN ONE-OF-ONE CORE, MEASURES 2 MM AND COMPRISES 25% OF   THE PROSTATE TISSUE     SPECIMEN \"C\":  PROSTATE, MID LEFT MEDIAL, ULTRASOUND-GUIDED CORE   BIOPSY:          PROSTATIC ADENOCARCINOMA, ACINAR TYPE, GRADE GROUP 1, ABDULLAHI   SCORE (3+3=6)     TUMOR PRESENT IN ONE-OF-ONE CORE, MEASURES 4 MM AND COMPRISES 35% OF   THE PROSTATE TISSUE     BENIGN COLON TISSUE     SPECIMEN \"D\":  PROSTATE, MID LEFT LATERAL, ULTRASOUND-GUIDED CORE   BIOPSY:          PROSTATIC ADENOCARCINOMA, ACINAR TYPE, GRADE GROUP 2, ABDULLAHI   SCORE (3+4=7)     TUMOR INVOLVES ONE-OF-ONE CORE, MEASURES 3 MM AND COMPRISES 38% OF THE   PROSTATE TISSUE     PERINEURAL INVASION PRESENT     SPECIMEN \"E\":  PROSTATE, APEX LEFT MEDIAL, ULTRASOUND-GUIDED CORE   BIOPSY:          PROSTATIC ADENOCARCINOMA, ACINAR TYPE, GRADE GROUP 1, ABDULLAHI   SCORE (3+3=6)     TUMOR PRESENT IN ONE-OF-ONE CORE, MEASURES 2.5 MM AND COMPRISES 45% OF   THE PROSTATE TISSUE     PERINEURAL INVASION PRESENT     BENIGN COLON TISSUE     SPECIMEN \"F\":  PROSTATE, APEX LEFT LATERAL, ULTRASOUND-GUIDED CORE   BIOPSY:          PROSTATIC ADENOCARCINOMA, GRADE GROUP 1, ABDULLAHI SCORE (3+3=6)     TUMOR PRESENT IN ONE-OF-ONE CORE, MEASURES 5 MM AND COMPRISES 77% OF   THE PROSTATE TISSUE     BENIGN COLON TISSUE     SPECIMEN \"G\":  PROSTATE, BASE RIGHT MEDIAL, ULTRASOUND-GUIDED CORE   BIOPSY:          BENIGN PROSTATE AND COLON TISSUE     SPECIMEN \"H\":  PROSTATE, BASE RIGHT LATERAL, ULTRASOUND-GUIDED CORE   BIOPSY:          BENIGN PROSTATE AND COLON TISSUE     SPECIMEN \"I\":  PROSTATE, MID RIGHT MEDIAL, ULTRASOUND-GUIDED CORE   BIOPSY:          ATYPICAL SMALL ACINAR PROLIFERATION (ASAP) AND HIGH-GRADE   PROSTATIC INTRAEPITHELIAL NEOPLASIA (PIN)     SPECIMEN \"J\":  PROSTATE, MID RIGHT LATERAL, ULTRASOUND-GUIDED CORE   BIOPSY:          HIGH-GRADE PROSTATIC INTRAEPITHELIAL NEOPLASIA (PIN)     BENIGN COLON TISSUE     SPECIMEN \"K\":  PROSTATE, APEX RIGHT MEDIAL, ULTRASOUND-GUIDED CORE   BIOPSY:          PROSTATIC ADENOCARCINOMA, ACINAR TYPE, GRADE GROUP 2, ZORAIDA   SCORE (3+4=7)     TUMOR PRESENT IN ONE-OF-ONE CORE, MEASURES 1.5 MM AND COMPRISES 17% OF   THE PROSTATE TISSUE     PERINEURAL INVASION PRESENT     BENIGN COLON TISSUE     SPECIMEN \"L\":  PROSTATE, APEX RIGHT LATERAL, ULTRASOUND-GUIDED CORE   BIOPSY:          HIGH-GRADE PROSTATIC INTRAEPITHELIAL NEOPLASIA (PIN)      Surgical Pathology Report  Surgical Pathology  Collected: 05/19/21 1100   Lab status: Final   Resulting lab: [facility]   Value: -- Diagnosis --     Prostate, radical prostatectomy:          Adenocarcinoma, Zoraida score 3+4 = 7. Tumor is bilateral and confined to the prostate.      Margins are free of involvement by tumor.       CYNDI Magallon Cera        Clinical Information   Prostate carcinoma     Source of Specimen   1: PROSTATE AND SEMINAL VESICLES     Gross Description   \"CARMEN GARCIA, PROSTATE AND SEMINAL VESICLES\" 63 gram, 4.8 x 5.4 x   4.5 cm (L x W x H) prostate with attached intact seminal vesicles and   vasa deferentia.  Separate are fragments of fat, 3.5 x 3.0 x 1.5 cm in   aggregate.  The external surfaces of the prostate are ragged pink-tan   with the right half inked blue, left half black and anterior aspect   tagged green.  Sectioning reveals ill-defined induration bilaterally   near the apex and also in the region of the base of the seminal   vesicles.  Periurethral nodules are present.  The seminal vesicles and   vasa deferentia are unremarkable.  Within the fat, is a 0.4 cm nodule.    Cassette summary:  \"A-B\" apex     Considerable retained stool suggesting some degree constipation with minimal   liquid stool right colon.  Correlate clinically. ASSESSMENT:    Baldev Wynne is a 80 y.o. male with renal transplant, chronic immunosuppression, prostate cancer and chronic pancytopenia. Pancytopenia: Patient has had low platelets and WBCs for past 4 to 5 years. His platelets seems to have slightly lower than his baseline. However his hemoglobin has dropped recently. His FISH, flow cytometry is negative peripheral blood smear did not show any morphologic abnormalities    Prostate cancer: Appears in remission. He has a radical prostatectomy in May this year. And recent PSA appears low at 0.1  His bone scan and CT scan were negative for metastasis. Status post renal transplant: Following with urologist  Chronic immunosuppression  Chronic kidney disease: Following with nephrologist  Patient's questions were sought and answered results from negative present the plan. PLAN:   I reviewed the recent lab work and discussed the treatment recommendations  His anemia and pancytopenia possibly from his renal disease and chronic immunosuppression  If his counts have been low but stable for past 4 years  For his chronic anemia he will benefit from erythropoietin stimulating agent  I discussed the possibility of bone marrow biopsy if his counts drops further      Stephen Jason MD  Hematologist/Medical Oncologist      This note is created with the assistance of a speech recognition program.  While intending to generate a document that actually reflects the content of the visit, the document can still have some errors including those of syntax and sound a like substitutions which may escape proof reading. It such instances, actual meaning can be extrapolated by contextual diversion.     CC:  eDan Natarajan,

## 2021-08-25 NOTE — TELEPHONE ENCOUNTER
Luca Santoro MD VISIT  DR De Oliveira July IN TO SEE PATIENT  ORDERS RECEIVED  LABS CDP 2/23/22  MD VISIT 2/23/22 @2PM  AVS PRINTED AND GIVEN TO PATIENT WITH INSTRUCTIONS  PATIENT DISCHARGED AMBULATORY

## 2021-09-22 ENCOUNTER — OFFICE VISIT (OUTPATIENT)
Dept: GASTROENTEROLOGY | Age: 71
End: 2021-09-22
Payer: COMMERCIAL

## 2021-09-22 VITALS
WEIGHT: 176 LBS | TEMPERATURE: 97.5 F | DIASTOLIC BLOOD PRESSURE: 70 MMHG | HEART RATE: 79 BPM | BODY MASS INDEX: 22.6 KG/M2 | SYSTOLIC BLOOD PRESSURE: 131 MMHG

## 2021-09-22 DIAGNOSIS — K52.9 CHRONIC DIARRHEA: ICD-10-CM

## 2021-09-22 DIAGNOSIS — D64.9 ANEMIA, UNSPECIFIED TYPE: Primary | ICD-10-CM

## 2021-09-22 DIAGNOSIS — B18.2 CHRONIC HEPATITIS C WITHOUT HEPATIC COMA (HCC): ICD-10-CM

## 2021-09-22 DIAGNOSIS — K62.89 RECTAL PAIN: ICD-10-CM

## 2021-09-22 DIAGNOSIS — D84.9 IMMUNOSUPPRESSED STATUS (HCC): ICD-10-CM

## 2021-09-22 PROCEDURE — 99214 OFFICE O/P EST MOD 30 MIN: CPT | Performed by: INTERNAL MEDICINE

## 2021-09-22 PROCEDURE — G8420 CALC BMI NORM PARAMETERS: HCPCS | Performed by: INTERNAL MEDICINE

## 2021-09-22 PROCEDURE — 1036F TOBACCO NON-USER: CPT | Performed by: INTERNAL MEDICINE

## 2021-09-22 PROCEDURE — 4040F PNEUMOC VAC/ADMIN/RCVD: CPT | Performed by: INTERNAL MEDICINE

## 2021-09-22 PROCEDURE — G8427 DOCREV CUR MEDS BY ELIG CLIN: HCPCS | Performed by: INTERNAL MEDICINE

## 2021-09-22 PROCEDURE — 1123F ACP DISCUSS/DSCN MKR DOCD: CPT | Performed by: INTERNAL MEDICINE

## 2021-09-22 PROCEDURE — 3017F COLORECTAL CA SCREEN DOC REV: CPT | Performed by: INTERNAL MEDICINE

## 2021-09-22 RX ORDER — POLYETHYLENE GLYCOL 3350, SODIUM SULFATE ANHYDROUS, SODIUM BICARBONATE, SODIUM CHLORIDE, POTASSIUM CHLORIDE 236; 22.74; 6.74; 5.86; 2.97 G/4L; G/4L; G/4L; G/4L; G/4L
4 POWDER, FOR SOLUTION ORAL ONCE
Qty: 4000 ML | Refills: 0 | Status: SHIPPED | OUTPATIENT
Start: 2021-09-22 | End: 2021-09-22

## 2021-09-22 ASSESSMENT — ENCOUNTER SYMPTOMS
NAUSEA: 0
TROUBLE SWALLOWING: 0
CHOKING: 0
VOMITING: 0
ABDOMINAL DISTENTION: 0
COUGH: 0
ABDOMINAL PAIN: 0
BLOOD IN STOOL: 0
RECTAL PAIN: 1
DIARRHEA: 0
CONSTIPATION: 0
ANAL BLEEDING: 0
WHEEZING: 0

## 2021-09-22 NOTE — PROGRESS NOTES
GI CLINIC FOLLOW UP    INTERVAL HISTORY:   [place], [address] [facility] [address]  [address] [facility]    Chief Complaint   Patient presents with    Rectal Pain     rectal pain for about 1 year. He states pain is there all the time. HISTORY OF PRESENT ILLNESS: Isaiah Dawson is a 80 y.o. male , referred for evaluation of  diarrhea, abd pain , hep C , today rectal    not seen since 2019    lost f/u    seen for above    he is s/p Kid tx   Used to have diarrhea , not any more  Today he has a new complaint which is rectal pain now, no bleeding, no fever no chills,  Colonoscopy 2019 : diverticula and hemorrhoids   Labs severe anemia with HGB 8.8   No sign of bleeding     past Medical,Family, and Social History reviewed and does contribute to the patient presentingcondition. Patient's PMH/PSH,SH,PSYCH Hx, MEDs, ALLERGIES, and ROS were all reviewed and updated in the appropriate sections.     PAST MEDICAL HISTORY:  Past Medical History:   Diagnosis Date    Anemia     CAD (coronary artery disease)     ([facility])    CKD (chronic kidney disease) stage 4, GFR 15-29 ml/min ([facility])     COVID-19 03/2020    Elevated PSA, between 10 and less than 20 ng/ml     End stage kidney disease ([facility]     Hemodialysis access, fistula mature ([facility]     right arm    Hemodialysis patient [facility])     patient had Kidney transplant-no longer on dialysis    Hepatitis C without hepatic coma     Hx of inguinal hernia repair     Hyperlipidemia     Hypertension     Irregular heart rate     wearing holter monitor 1/29/21    Leukopenia 01/29/2021    Palpitations     Prostate cancer ([facility]     prostate    Renal [facility]20[facility]2017    [facility]    Thrombocytopenia ([facility] 01/29/2021    Type 2 diabetes mellitus ([facility]     Under care of team 05/04/2021    [facility]phrology-[facility]-last visit april 2021    Under care of team 05/04/2021    cardiology-[facility]-aleida wade-last visit onc 2020    Wellness examination 05/04/2021    pcp-Dr [facility]-last visit march 2021       Past Surgical History:   Procedure Laterality Date    CARDIAC CATHETERIZATION      Patient states normal    COLONOSCOPY N/A 10/30/2019    COLONOSCOPY WITH BIOPSY performed by Keli Vazquez MD at [address] [facility] [address] BIOPSY RENAL  8/6/2021    CT BIOPSY RENAL    CYSTOSCOPY Right 05/19/2021     CYSTOSCOPY URETERAL STENT INSERTION (Right )    CYSTOSCOPY Right 5/19/2021    CYSTOSCOPY performed by Ky Samayoa MD at [address] [place] [address] [address] [facility] Right 11/2015    at [facility] [place] [address]    left inguinal    KIDNEY TRANSPLANT  2017    right    LIVER BIOPSY      PROSTATE BIOPSY N/A 2/12/2021    PROSTATE BIOPSY WITH ULTRASOUND TO OR WITH TECH performed by Bryant Bautista MD at [address] [facility]  05/19/2021    XI ROBOTIC LAPAROSCOPIC PROSTATECTOMY WITH STANDARD PELVIC LYMPHNODE DISSECTION AND LEFT PELVIC LYMPHNODE DISSECTION     PROSTATECTOMY N/A 5/19/2021    XI ROBOTIC LAPAROSCOPIC PROSTATECTOMY DISSECTION performed by Ky Samayoa MD at [address] [facility]  2/12/2021    [facility] 2/12/2021 [facility] ULTRASOUND       CURRENT MEDICATIONS:    Current Outpatient Medications:     bumetanide (BUMEX) 2 MG tablet, Take 2 mg by mouth 2 times daily , Disp: , Rfl:     sodium bicarbonate 650 MG tablet, 650 mg 2 times daily , Disp: , Rfl:     atorvastatin (LIPITOR) 40 MG tablet, TAKE 1 TABLET MY MOUTH DAILY, Disp: 90 tablet, Rfl: 0    insulin glargine (BASAGLAR KWIKPEN) 100 UNIT/ML injection pen, Inject 15 Units into the skin nightly, Disp: 5 pen, Rfl: 0    famotidine (PEPCID) 20 MG tablet, Take 1 tablet by mouth 2 times daily, Disp: 60 tablet, Rfl: 5    Alcohol Swabs PADS, 1 box by Does not apply route 4 times daily, Disp: 100 each, Rfl: 5    Insulin Pen Needle (PEN NEEDLES) 32G X 4 MM MISC, 4 pens by Does not apply route 4 times daily, Disp: 350 each, Rfl: 5    metoprolol succinate (TOPROL XL) 50 MG extended release tablet, Take 1 tablet by mouth daily, Disp: 90 tablet, Rfl: 1    NIFEdipine (PROCARDIA XL) 90 MG extended release tablet, Take 1 tablet by mouth daily, Disp: 90 tablet, Rfl: 1    insulin lispro, 1 Unit Dial, (HUMALOG KWIKPEN) 100 UNIT/ML SOPN, Inject 15 Units into the skin 3 times daily (before meals), Disp: 5 pen, Rfl: 0    blood glucose monitor strips, Test 4 times a day & as needed for symptoms of irregular blood glucose. Dispense sufficient amount for indicated testing frequency plus additional to accommodate PRN testing needs. , Disp: 350 strip, Rfl: 5    Lancets MISC, 1 each by Does not apply route 4 times daily, Disp: 600 each, Rfl: 5    Lancets Ultra Thin MISC, 1 Device by Does not apply route 4 times daily, Disp: 500 each, Rfl: 1    blood glucose monitor strips, Test blood sugar 4 times daily, Disp: 500 strip, Rfl: 1    ZORTRESS 1 MG TABS, Take 1 mg by mouth 2 times daily, Disp: , Rfl:     Everolimus 0.75 MG TABS, 2 times daily 3 tabs 2 times daily, Disp: , Rfl:     magnesium oxide (MAG-OX) 400 (241.3 Mg) MG TABS tablet, TAKE 1 TABLET BY MOUTH TWICE A DAY, Disp: 60 tablet, Rfl: 2    tacrolimus (PROGRAF) 1 MG capsule, Take 6 mg by mouth 2 times daily , Disp: , Rfl:     gabapentin (NEURONTIN) 100 MG capsule, Take 1 capsule by mouth 3 times daily for 30 days.  (Patient taking differently: Take 100 mg by mouth 2 times daily. ), Disp: 180 capsule, Rfl: 0    ALLERGIES:   Allergies   Allergen Reactions    Amino Acids Nausea Only    Lisinopril Nausea Only       FAMILY HISTORY:       Problem Relation Age of Onset    Cancer Mother         Lymphoma    Lung Cancer Father     Colon Cancer Brother          SOCIAL HISTORY:   Social History     Socioeconomic History    Marital status:      Spouse name: Not on file    Number of children: Not on file    Years of education: Not on file    Highest education level: Not on file   Occupational History    Not on file   Tobacco Use    Smoking status: Former Smoker     Packs/day: 0.50     Years: 50.00     Pack years: 25.00     Types: Cigarettes     Quit date: 2013     Years since quittin.4    Smokeless tobacco: Never Used    Tobacco comment: No smoking in at least 3 years   Vaping Use    Vaping Use: Never used   Substance and Sexual Activity    Alcohol use: No     Comment: No ETOH in 3 years    Drug use: Not Currently     Types: Other-see comments     Comment: Hx of marijuana and crack cocaine previously - last use was about 3 years ago as of 22    Sexual activity: Never   Other Topics Concern    Not on file   Social History Narrative    Not on file     Social Determinants of Health     Financial Resource Strain: Low Risk     Difficulty of Paying Living Expenses: Not hard at all   Food Insecurity: No Food Insecurity    Worried About [address] in the Last Year: Never true    [facility] in the Last Year: Never true   Transportation Needs:     Lack of Transportation (Medical):  Lack of Transportation (Non-Medical):    Physical Activity:     Days of Exercise per Week:     Minutes of Exercise per Session:    Stress:     Feeling of Stress :    Social Connections:     Frequency of Communication with Friends and Family:     Frequency of Social Gatherings with Friends and Family:     Attends [employer] Services:     Active Member of Clubs or Organizations:     Attends Club or Organization Meetings:     Marital Status:    Intimate Partner Violence:     Fear of Current or Ex-Partner:     Emotionally Abused:     Physically Abused:     Sexually Abused:        REVIEW OF SYSTEMS: A 12-point review of systemswas obtained and pertinent positives and negatives were enumerated above in the history of present illness. All other reviewed systems / symptoms were negative. Review of Systems   Constitutional: Positive for fatigue.  Negative for appetite change and unexpected weight change. HENT: Negative for trouble swallowing. Respiratory: Negative for cough, choking and wheezing. Cardiovascular: Negative for chest pain, palpitations and leg swelling. Gastrointestinal: Positive for rectal pain. Negative for abdominal distention, abdominal pain, anal bleeding, blood in stool, constipation, diarrhea, nausea and vomiting. Genitourinary: Negative for difficulty urinating. Allergic/Immunologic: Negative for environmental allergies and food allergies. Neurological: Negative for dizziness, weakness, light-headedness, numbness and headaches. Hematological: Does not bruise/bleed easily. Psychiatric/Behavioral: Negative for sleep disturbance. The patient is not nervous/anxious. LABORATORY DATA: Reviewed  Lab Results   Component Value Date    WBC 2.9 (L) 08/13/2021    HGB 8.8 (L) 08/13/2021    HCT 27.7 (L) 08/13/2021    MCV 89.9 08/13/2021     (L) 08/13/2021     07/06/2021    K 5.0 07/06/2021     (H) 07/06/2021    CO2 20 07/06/2021    BUN 41 (H) 07/06/2021    CREATININE 3.06 (H) 07/06/2021    LABPROT 5.8 (L) 03/01/2013    LABALBU 4.0 07/06/2021    BILITOT 0.46 03/24/2020    ALKPHOS 111 03/24/2020    AST 17 07/06/2021    ALT 17 07/06/2021    INR 1.0 03/24/2020         Lab Results   Component Value Date    RBC 3.08 (L) 08/13/2021    HGB 8.8 (L) 08/13/2021    MCV 89.9 08/13/2021    MCH 28.6 08/13/2021    MCHC 31.8 08/13/2021    RDW 13.0 08/13/2021    MPV 11.9 08/13/2021    BASOPCT 1 08/13/2021    LYMPHSABS 0.73 (L) 08/13/2021    MONOSABS 0.45 08/13/2021    NEUTROABS 1.58 08/13/2021    EOSABS 0.08 08/13/2021    BASOSABS <0.03 08/13/2021         DIAGNOSTIC TESTING:     No results found. PHYSICAL EXAMINATION: Vital signs reviewed per the nursing documentation. /70   Pulse 79   Temp 97.5 °F (36.4 °C)   Wt 176 lb (79.8 kg)   BMI 22.60 kg/m²   Body mass index is 22.6 kg/m². Physical Exam  Vitals and nursing note reviewed. Constitutional:       General: He is not in acute distress. Appearance: He is well-developed. He is not diaphoretic. HENT:      Head: Normocephalic and atraumatic. Eyes:      General: No scleral icterus. Pupils: Pupils are equal, round, and reactive to light. Neck:      Thyroid: No thyromegaly. Vascular: No JVD. Trachea: No tracheal deviation. Cardiovascular:      Rate and Rhythm: Normal rate and regular rhythm. Heart sounds: Normal heart sounds. No murmur heard. Pulmonary:      Effort: Pulmonary effort is normal. No respiratory distress. Breath sounds: Normal breath sounds. No wheezing. Abdominal:      General: Bowel sounds are normal. There is no distension. Palpations: Abdomen is soft. There is no mass. Tenderness: There is no abdominal tenderness. There is no guarding or rebound. Musculoskeletal:         General: No tenderness. Normal range of motion. Cervical back: Normal range of motion and neck supple. Skin:     General: Skin is warm. Coloration: Skin is not pale. Findings: No erythema or rash. Comments: He is not diaphoretic   Neurological:      Mental Status: He is alert and oriented to person, place, and time. Deep Tendon Reflexes: Reflexes are normal and symmetric. Psychiatric:         Behavior: Behavior normal.         Thought Content: Thought content normal.         Judgment: Judgment normal.           IMPRESSION: Mr. Jerod Powell is a 80 y.o. male with      Diagnosis Orders   1. Anemia, unspecified type  Iron and TIBC    Vitamin B12    Folate    EGD    COLONOSCOPY W/ OR W/O BIOPSY    Hepatitis C RNA, quantitative, PCR   2. Chronic diarrhea     3. Chronic hepatitis C without hepatic coma ([facility]     4. Immunosuppressed status ([facility]     5.  Rectal pain  COLONOSCOPY W/ OR W/O BIOPSY     Because of the severe anemia on the rectal bleed we will proceed with the above  We will make sure the patient staying is a sustained responder  And will take it from there  Continue on the same regimen which will include Pepcid  Diet/life style/natural hx /complication of the dx were all explained in details   Past medical, past surgical, social history, psychiatric history, medications or allergies, all reviewed and  updated    Thank you for allowing me to participate in the care of Mr. David Ma. For any further questions please do not hesitate to contact me. I have reviewed and agree with the ROS entered by the MA/RN. Note is dictated utilizing voice recognition software. Unfortunately this leads to occasional typographical errors. Please contact our office if you have any questions.       Toribio Vallejo MD  [facility]  O: #[phone]

## 2021-10-04 RX ORDER — EVEROLIMUS TABLETS 0.75 MG/1
TABLET ORAL
Qty: 180 TABLET | Refills: 0 | OUTPATIENT
Start: 2021-10-04

## 2021-10-04 NOTE — TELEPHONE ENCOUNTER
Rafael Ambrose is calling to request a refill on the following medication(s):    Medication Request:  Requested Prescriptions     Pending Prescriptions Disp Refills    Everolimus 0.75 MG TABS 180 tablet 0     Sig: 3 tabs 2 times daily       Last Visit Date (If Applicable):  6/0/5469    Next Visit Date:    10/18/2021

## 2021-10-11 ENCOUNTER — TELEPHONE (OUTPATIENT)
Dept: FAMILY MEDICINE CLINIC | Age: 71
End: 2021-10-11

## 2021-10-14 DIAGNOSIS — E11.65 TYPE 2 DIABETES MELLITUS WITH HYPERGLYCEMIA, WITH LONG-TERM CURRENT USE OF INSULIN (HCC): ICD-10-CM

## 2021-10-14 DIAGNOSIS — Z79.4 TYPE 2 DIABETES MELLITUS WITH HYPERGLYCEMIA, WITH LONG-TERM CURRENT USE OF INSULIN (HCC): ICD-10-CM

## 2021-10-14 RX ORDER — PEN NEEDLE, DIABETIC 30 GX3/16"
4 NEEDLE, DISPOSABLE MISCELLANEOUS 4 TIMES DAILY
Qty: 350 EACH | Refills: 5 | Status: SHIPPED | OUTPATIENT
Start: 2021-10-14 | End: 2022-05-16 | Stop reason: SDUPTHER

## 2021-10-14 RX ORDER — INSULIN GLARGINE 100 [IU]/ML
15 INJECTION, SOLUTION SUBCUTANEOUS NIGHTLY
Qty: 5 PEN | Refills: 0 | Status: SHIPPED | OUTPATIENT
Start: 2021-10-14 | End: 2022-04-01 | Stop reason: SDUPTHER

## 2021-10-14 RX ORDER — INSULIN LISPRO 100 [IU]/ML
15 INJECTION, SOLUTION INTRAVENOUS; SUBCUTANEOUS
Qty: 5 PEN | Refills: 0 | Status: SHIPPED | OUTPATIENT
Start: 2021-10-14 | End: 2022-03-23

## 2021-10-15 ENCOUNTER — TELEPHONE (OUTPATIENT)
Dept: GASTROENTEROLOGY | Age: 71
End: 2021-10-15

## 2021-10-15 NOTE — TELEPHONE ENCOUNTER
Writer returned pt's phone call and spoke to pt over the phone in regards to rescheduling 10/20/21 EGD/colon proc. Pt states he is sick w/ a cold and has a cough. Writer informed pt we will cancel 10/20/21 proc. Pt will call back to reschedule after he is feeling better. Pt voices his understanding.

## 2021-10-26 ENCOUNTER — TELEPHONE (OUTPATIENT)
Dept: FAMILY MEDICINE CLINIC | Age: 71
End: 2021-10-26

## 2021-10-26 NOTE — TELEPHONE ENCOUNTER
----- Message from Darnell Kendrick sent at 10/26/2021  2:19 PM EDT -----  Subject: Appointment Request    Reason for Call: Routine Medicare AWV    QUESTIONS  Type of Appointment? Established Patient  Reason for appointment request? No appointments available during search  Additional Information for Provider? pt is needing to schedule an awv and   was unable to find an appt . . pt can be reached at [phone]  ---------------------------------------------------------------------------  --------------  [address]  What is the best way for the office to contact you? OK to leave message on   voicemail  Preferred Call Back Phone Number? [phone]  ---------------------------------------------------------------------------  --------------  SCRIPT ANSWERS  Relationship to Patient? Self  (If the patient has Medicare as their primary insurance coverage ask this   question) Are you requesting a Medicare Annual Wellness Visit? Yes   (Is the patient requesting a pap smear with their physical exam?)? No  (Is the patient requesting their annual physical and does not need PAP or   AWV per above?)? No  Have you been diagnosed with, awaiting test results for, or told that you   are suspected of having COVID-19 (Coronavirus)? (If patient has tested   negative or was tested as a requirement for work, school, or travel and   not based on symptoms, answer no)? No  Within the past two weeks have you developed any of the following symptoms   (answer no if symptoms have been present longer than 2 weeks or began   more than 2 weeks ago)? Fever or Chills, Cough, Shortness of breath or   difficulty breathing, Loss of taste or smell, Sore throat, Nasal   congestion, Sneezing or runny nose, Fatigue or generalized body aches   (answer no if pain is specific to a body part e.g. back pain), Diarrhea,   Headache? No  Have you had close contact with someone with COVID-19 in the last 14 days?    No  (Service Expert  click yes below to proceed with [employer] As Usual   Scheduling)?  Yes

## 2021-11-03 ENCOUNTER — TELEPHONE (OUTPATIENT)
Dept: UROLOGY | Age: 71
End: 2021-11-03

## 2021-11-03 NOTE — TELEPHONE ENCOUNTER
Pt will be calling with a fax number for [facility] lab. PSA order will need to be faxed.

## 2021-11-11 ENCOUNTER — OFFICE VISIT (OUTPATIENT)
Dept: UROLOGY | Age: 71
End: 2021-11-11
Payer: MEDICARE

## 2021-11-11 VITALS
HEIGHT: 74 IN | TEMPERATURE: 97 F | DIASTOLIC BLOOD PRESSURE: 86 MMHG | SYSTOLIC BLOOD PRESSURE: 127 MMHG | WEIGHT: 176 LBS | RESPIRATION RATE: 17 BRPM | BODY MASS INDEX: 22.59 KG/M2 | HEART RATE: 72 BPM

## 2021-11-11 DIAGNOSIS — C61 PROSTATE CANCER (HCC): Primary | ICD-10-CM

## 2021-11-11 DIAGNOSIS — N39.3 STRESS INCONTINENCE OF URINE: ICD-10-CM

## 2021-11-11 DIAGNOSIS — N52.31 ERECTILE DYSFUNCTION AFTER RADICAL PROSTATECTOMY: ICD-10-CM

## 2021-11-11 DIAGNOSIS — Z90.79 HISTORY OF ROBOT-ASSISTED LAPAROSCOPIC RADICAL PROSTATECTOMY: ICD-10-CM

## 2021-11-11 PROCEDURE — 3017F COLORECTAL CA SCREEN DOC REV: CPT | Performed by: UROLOGY

## 2021-11-11 PROCEDURE — 1123F ACP DISCUSS/DSCN MKR DOCD: CPT | Performed by: UROLOGY

## 2021-11-11 PROCEDURE — 1036F TOBACCO NON-USER: CPT | Performed by: UROLOGY

## 2021-11-11 PROCEDURE — G8484 FLU IMMUNIZE NO ADMIN: HCPCS | Performed by: UROLOGY

## 2021-11-11 PROCEDURE — 4040F PNEUMOC VAC/ADMIN/RCVD: CPT | Performed by: UROLOGY

## 2021-11-11 PROCEDURE — G8427 DOCREV CUR MEDS BY ELIG CLIN: HCPCS | Performed by: UROLOGY

## 2021-11-11 PROCEDURE — 99214 OFFICE O/P EST MOD 30 MIN: CPT | Performed by: UROLOGY

## 2021-11-11 PROCEDURE — G8420 CALC BMI NORM PARAMETERS: HCPCS | Performed by: UROLOGY

## 2021-11-11 RX ORDER — TADALAFIL 5 MG
5 TABLET ORAL DAILY
Qty: 90 TABLET | Refills: 3 | Status: SHIPPED
Start: 2021-11-11 | End: 2021-12-07

## 2021-11-11 ASSESSMENT — ENCOUNTER SYMPTOMS
NAUSEA: 0
COUGH: 0
ABDOMINAL PAIN: 0
CONSTIPATION: 0
WHEEZING: 0
SHORTNESS OF BREATH: 0
EYE PAIN: 0
DIARRHEA: 0
VOMITING: 0
BACK PAIN: 0

## 2021-11-11 NOTE — PROGRESS NOTES
[address] [address]  Dept: [address] [facility] Urology Office Note - Established    Patient:  Primitivo Whitehead  YOB: 1950  Date: 11/11/2021    The patient is a 80 y.o. male who presents todayfor evaluation of the following problems:   Chief Complaint   Patient presents with    Prostate Cancer     3 month psa - [facility]       HPI  Here for f/u prostate ca. RRP 5/19/21. PSA <0.1 3ppd doesn't do kegels. ED gets filling not enough to penetrate. Would like to try meds. Doesn't do SHIMA      Summary of old records: N/A    Additional History: N/A    Procedures Today: N/A    Urinalysis today:  No results found for this visit on 11/11/21. Last several PSA's:  Lab Results   Component Value Date    PSA 12.22 (H) 08/14/2020    PSA 3.2 02/16/2018    PSA 2.4 05/02/2017     Last total testosterone:  No results found for: TESTOSTERONE    AUA Symptom Score (11/11/2021):   INCOMPLETE EMPTYING: How often have you had the sensation of not emptying your bladder?: Not at all  FREQUENCY: How often do you have to urinate less than every two hours?: Not at all  INTERMITTENCY: How often have you found you stopped and started again several times when you urinated?: Not at all  URGENCY: How often have you found it difficult to postpone urination?: Not at all  WEAK STREAM: How often have you had a weak urinary stream?: Not at all  STRAINING: How often have you had to strain to start  urination?: Not at all  NOCTURIA: How many times did you typically get up at night to uriniate?: 5 Times  TOTAL I-PSS SCORE[de-identified] 5       Last BUN and creatinine:  Lab Results   Component Value Date    BUN 41 (H) 07/06/2021     Lab Results   Component Value Date    CREATININE 3.06 (H) 07/06/2021       Additional Lab/Culture results: none    Imaging Reviewed during this Office Visit: none  (results were independently reviewed by physician and radiology report verified)    PAST MEDICAL, FAMILY AND SOCIAL HISTORY UPDATE:  Past Medical History:   Diagnosis Date    Anemia     CAD (coronary artery disease)     ([facility])    CKD (chronic kidney disease) stage 4, GFR 15-29 ml/min ([facility])     COVID-19 03/2020    Elevated PSA, between 10 and less than 20 ng/ml     End stage kidney disease ([facility]     Hemodialysis access, fistula mature ([facility]     right arm    Hemodialysis patient [facility])     patient had Kidney transplant-no longer on dialysis    Hepatitis C without hepatic coma     Hx of inguinal hernia repair     Hyperlipidemia     Hypertension     Irregular heart rate     wearing holter monitor 1/29/21    Leukopenia 01/29/2021    Palpitations     Prostate cancer ([address] [facility]     prostate    Ren[facility]    [facility]    Thrombocytopenia ([facility] 01/29/2021    Type 2 diabetes mellitus ([facility]     Under care of team 05/04/2021    [facility]phrology[facility]-last visit april 2021    Under care of team 05/04/2021    [facility] -last visit onc 2020    Wellness examination 05/04/2021    pcp-Dr Cruz[facility]-last visit march 2021     Past Surgical History:   Procedure Laterality Date    CARDIAC CATHETERIZATION      Patient states normal    COLONOSCOPY N/A 10/30/2019    COLONOSCOPY WITH BIOPSY performed by Eddi Huntley MD at [address] [facility] [address] BIOPSY RENAL  8/6/2021    CT BIOPSY RENAL    CYSTOSCOPY Right 05/19/2021     CYSTOSCOPY URETERAL STENT INSERTION (Right )    CYSTOSCOPY Right 5/19/2021    CYSTOSCOPY performed by Lily Malone MD at [facility] Right 11/2015    at [facility] [place] [address]    left inguinal    KIDNEY TRANSPLANT  2017    right    LIVER BIOPSY      PROSTATE BIOPSY N/A 2/12/2021    PROSTATE BIOPSY WITH ULTRASOUND TO OR WITH TECH performed by Arsh Garcia MD at [address] [place] [address]  05/19/2021    XI ROBOTIC LAPAROSCOPIC PROSTATECTOMY WITH STANDARD PELVIC LYMPHNODE DISSECTION AND LEFT PELVIC LYMPHNODE DISSECTION     PROSTATECTOMY N/A 5/19/2021    XI ROBOTIC LAPAROSCOPIC PROSTATECTOMY DISSECTION performed by Nicola Spivey MD at [address] [facility] [address]  2/12/2021     PROSTATE NEEDLE PUNCH 2/12/2021 [facility] ULTRASOUND     Family History   Problem Relation Age of Onset    Cancer Mother         Lymphoma    Lung Cancer Father     Colon Cancer Brother      Outpatient Medications Marked as Taking for the 11/11/21 encounter (Office Visit) with Nicola Spivey MD   Medication Sig Dispense Refill    insulin lispro, 1 Unit Dial, (HUMALOG KWIKPEN) 100 UNIT/ML SOPN Inject 15 Units into the skin 3 times daily (before meals) 5 pen 0    insulin glargine (BASAGLAR KWIKPEN) 100 UNIT/ML injection pen Inject 15 Units into the skin nightly 5 pen 0    Insulin Pen Needle (PEN NEEDLES) 32G X 4 MM MISC 4 pens by Does not apply route 4 times daily 350 each 5    bumetanide (BUMEX) 2 MG tablet Take 2 mg by mouth 2 times daily       sodium bicarbonate 650 MG tablet 650 mg 2 times daily       atorvastatin (LIPITOR) 40 MG tablet TAKE 1 TABLET MY MOUTH DAILY 90 tablet 0    famotidine (PEPCID) 20 MG tablet Take 1 tablet by mouth 2 times daily 60 tablet 5    Alcohol Swabs PADS 1 box by Does not apply route 4 times daily 100 each 5    metoprolol succinate (TOPROL XL) 50 MG extended release tablet Take 1 tablet by mouth daily 90 tablet 1    NIFEdipine (PROCARDIA XL) 90 MG extended release tablet Take 1 tablet by mouth daily 90 tablet 1    blood glucose monitor strips Test 4 times a day & as needed for symptoms of irregular blood glucose. Dispense sufficient amount for indicated testing frequency plus additional to accommodate PRN testing needs.  350 strip 5    Lancets MISC 1 each by Does not apply route 4 times daily 600 each 5    Lancets Ultra Thin MISC 1 Device by Does not apply route 4 times daily 500 each 1    blood glucose monitor strips Test blood sugar 4 times daily 500 strip 1    ZORTRESS 1 MG TABS Take 1 mg by mouth 2 times daily      Everolimus 0.75 MG TABS 2 times daily 3 tabs 2 times daily      magnesium oxide (MAG-OX) 400 (241.3 Mg) MG TABS tablet TAKE 1 TABLET BY MOUTH TWICE A DAY 60 tablet 2    tacrolimus (PROGRAF) 1 MG capsule Take 6 mg by mouth 2 times daily       CIALIS 5 MG tablet Take 1 tablet by mouth daily 90 tablet 3       Amino acids and Lisinopril  Social History     Tobacco Use   Smoking Status Former Smoker    Packs/day: 0.50    Years: 50.00    Pack years: 25.00    Types: Cigarettes    Quit date: 2013    Years since quittin.6   Smokeless Tobacco Never Used   Tobacco Comment    No smoking in at least 3 years     (Ifpatient a smoker, smoking cessation counseling offered)    Social History     Substance and Sexual Activity   Alcohol Use No    Comment: No ETOH in 3 years       REVIEW OF SYSTEMS:  Review of Systems    Physical Exam:      Vitals:    21 1033   BP: 127/86   Pulse: 72   Resp: 17   Temp: 97 °F (36.1 °C)     Body mass index is 22.6 kg/m². Patient is a 80 y.o. male in no acute distress and alert and oriented to person, place and time. Physical Exam  Constitutional: Patient in no acute distress. Neuro: Alert and oriented to person, place and time. Psych: Mood normal, affect normal  Skin: No rash noted  HEENT: Head: Normocephalic andatraumatic  Conjunctivae and EOM are normal. Pupils are equal, round  Nose:Normal  Right External Ear: Normal; Left External Ear: Normal  Mouth: Mucosa Moist  Neck: Supple  Lungs: Respiratory effort is normal  Cardiovascular: Warm & Pink  Abdomen: Soft, non-tender, non-distended with no CVA,  No flank tenderness,  Or hepatosplenomegaly   Lymphatics: No palpablelymphadenopathy. Bladder non-tender and not distended. Musculoskeletal: Normal gait and station      Assessment and Plan      1. Prostate cancer ([address]    2. Stress incontinence of urine    3.  History of robot-assisted laparoscopic radical prostatectomy    4. Erectile dysfunction after radical prostatectomy           Plan:     PSA stable s/p RRP. ED chronic with exac not at goal will try tadalafil. Return in about 3 months (around 2/11/2022) for PSA. Prescriptions Ordered:  Orders Placed This Encounter   Medications    CIALIS 5 MG tablet     Sig: Take 1 tablet by mouth daily     Dispense:  90 tablet     Refill:  3     Orders Placed:  Orders Placed This Encounter   Procedures    PSA, Diagnostic     Standing Status:   Future     Standing Expiration Date:   11/11/2022           Lucía Estrada MD    Agree with the ROS entered by the MA.

## 2021-11-23 ENCOUNTER — TELEPHONE (OUTPATIENT)
Dept: FAMILY MEDICINE CLINIC | Age: 71
End: 2021-11-23

## 2021-11-23 NOTE — TELEPHONE ENCOUNTER
----- Message from Júnior Lawson sent at 11/23/2021  1:28 PM EST -----  Subject: Message to Provider    QUESTIONS  Information for Provider? pt called in regards to paperwork that was to   filled out by PCP stating pt can;t walk far etc.   ---------------------------------------------------------------------------  --------------  CALL BACK INFO  What is the best way for the office to contact you? OK to leave message on   voicemail  Preferred Call Back Phone Number? [phone]  ---------------------------------------------------------------------------  --------------  SCRIPT ANSWERS  Relationship to Patient?  Self

## 2021-11-23 NOTE — TELEPHONE ENCOUNTER
NS appt yesterday will need appt for paperwork. At very least would need telephone visit to complete paperwork.

## 2021-12-07 ENCOUNTER — OFFICE VISIT (OUTPATIENT)
Dept: FAMILY MEDICINE CLINIC | Age: 71
End: 2021-12-07
Payer: MEDICARE

## 2021-12-07 VITALS
HEART RATE: 84 BPM | SYSTOLIC BLOOD PRESSURE: 134 MMHG | BODY MASS INDEX: 22.34 KG/M2 | OXYGEN SATURATION: 98 % | WEIGHT: 174 LBS | DIASTOLIC BLOOD PRESSURE: 72 MMHG

## 2021-12-07 DIAGNOSIS — N18.6 END-STAGE RENAL DISEASE (HCC): ICD-10-CM

## 2021-12-07 DIAGNOSIS — E78.5 DYSLIPIDEMIA: ICD-10-CM

## 2021-12-07 DIAGNOSIS — Z76.0 MEDICATION REFILL: ICD-10-CM

## 2021-12-07 DIAGNOSIS — I10 HTN, GOAL BELOW 130/80: ICD-10-CM

## 2021-12-07 DIAGNOSIS — Z79.4 TYPE 2 DIABETES MELLITUS WITH HYPERGLYCEMIA, WITH LONG-TERM CURRENT USE OF INSULIN (HCC): Primary | ICD-10-CM

## 2021-12-07 DIAGNOSIS — E11.65 TYPE 2 DIABETES MELLITUS WITH HYPERGLYCEMIA, WITH LONG-TERM CURRENT USE OF INSULIN (HCC): Primary | ICD-10-CM

## 2021-12-07 PROBLEM — E44.0 MODERATE PROTEIN-CALORIE MALNUTRITION (HCC): Status: RESOLVED | Noted: 2020-03-24 | Resolved: 2021-12-07

## 2021-12-07 PROCEDURE — 1036F TOBACCO NON-USER: CPT | Performed by: FAMILY MEDICINE

## 2021-12-07 PROCEDURE — G8484 FLU IMMUNIZE NO ADMIN: HCPCS | Performed by: FAMILY MEDICINE

## 2021-12-07 PROCEDURE — 4040F PNEUMOC VAC/ADMIN/RCVD: CPT | Performed by: FAMILY MEDICINE

## 2021-12-07 PROCEDURE — 2022F DILAT RTA XM EVC RTNOPTHY: CPT | Performed by: FAMILY MEDICINE

## 2021-12-07 PROCEDURE — G8420 CALC BMI NORM PARAMETERS: HCPCS | Performed by: FAMILY MEDICINE

## 2021-12-07 PROCEDURE — 3017F COLORECTAL CA SCREEN DOC REV: CPT | Performed by: FAMILY MEDICINE

## 2021-12-07 PROCEDURE — 3052F HG A1C>EQUAL 8.0%<EQUAL 9.0%: CPT | Performed by: FAMILY MEDICINE

## 2021-12-07 PROCEDURE — 1123F ACP DISCUSS/DSCN MKR DOCD: CPT | Performed by: FAMILY MEDICINE

## 2021-12-07 PROCEDURE — G8427 DOCREV CUR MEDS BY ELIG CLIN: HCPCS | Performed by: FAMILY MEDICINE

## 2021-12-07 PROCEDURE — 99214 OFFICE O/P EST MOD 30 MIN: CPT | Performed by: FAMILY MEDICINE

## 2021-12-07 RX ORDER — GLUCOSAMINE HCL/CHONDROITIN SU 500-400 MG
CAPSULE ORAL
Qty: 500 STRIP | Refills: 1 | Status: SHIPPED | OUTPATIENT
Start: 2021-12-07 | End: 2021-12-16 | Stop reason: SDUPTHER

## 2021-12-07 RX ORDER — ACETAMINOPHEN 500 MG
1000 TABLET ORAL 3 TIMES DAILY
Qty: 540 TABLET | Refills: 1 | Status: SHIPPED | OUTPATIENT
Start: 2021-12-07

## 2021-12-07 NOTE — PROGRESS NOTES
Progress Note    Kendell Marrufo is a 80 y.o.  male who presents today alone for evaluation of   Chief Complaint   Patient presents with   Maria T Hernandez Other     needs paper work filled out     Diabetes    Hyperlipidemia    Hypertension           HPI:   Patient is here for HTN, DM type 2, and dyslipidemia follow up. Patient denies cp/sob/le edema/dizziness/lightheadedness/blurry va/ha. Patient states he is taking all of his medications as directed. Patient states he is seeing his nephrologist regularly. Patient last HbA1c 7/2021 8.7. Patient states he is using his basaglar 15 units nightly. Patient is supposed to be taking 12 units of Humalog three times daily with meals. Patient states he is attempting to monitor his carb intake. Patient denies exertional calf cramping. Patient denies polyuria/polyphagia/polydipsia. Patient is due for JOSE DAVID. He states his FBG was 130-150. Patient needs a refill of tylenol & test strips today.     Health Maintenance Due   Topic Date Due    Hepatitis A vaccine (1 of 2 - Risk 2-dose series) Never done    Shingles Vaccine (1 of 2) Never done    Diabetic retinal exam  08/16/2017    Diabetic foot exam  11/22/2020    Low dose CT lung screening  03/24/2021    COVID-19 Vaccine (3 - Moderna risk 4-dose series) 04/07/2021    Annual Wellness Visit (AWV)  06/30/2021        Current Medications:     Current Outpatient Medications   Medication Sig Dispense Refill    blood glucose monitor strips Test blood sugar 4 times daily 500 strip 1    acetaminophen (TYLENOL) 500 MG tablet Take 2 tablets by mouth 3 times daily 540 tablet 1    insulin lispro, 1 Unit Dial, (HUMALOG KWIKPEN) 100 UNIT/ML SOPN Inject 15 Units into the skin 3 times daily (before meals) 5 pen 0    insulin glargine (BASAGLAR KWIKPEN) 100 UNIT/ML injection pen Inject 15 Units into the skin nightly 5 pen 0    Insulin Pen Needle (PEN NEEDLES) 32G X 4 MM MISC 4 pens by Does not apply route 4 times daily 350 each 5    bumetanide (BUMEX) 2 MG tablet Take 2 mg by mouth every other day       sodium bicarbonate 650 MG tablet 650 mg 2 times daily       atorvastatin (LIPITOR) 40 MG tablet TAKE 1 TABLET MY MOUTH DAILY 90 tablet 0    Alcohol Swabs PADS 1 box by Does not apply route 4 times daily 100 each 5    metoprolol succinate (TOPROL XL) 50 MG extended release tablet Take 1 tablet by mouth daily 90 tablet 1    NIFEdipine (PROCARDIA XL) 90 MG extended release tablet Take 1 tablet by mouth daily 90 tablet 1    blood glucose monitor strips Test 4 times a day & as needed for symptoms of irregular blood glucose. Dispense sufficient amount for indicated testing frequency plus additional to accommodate PRN testing needs. 350 strip 5    Lancets MISC 1 each by Does not apply route 4 times daily 600 each 5    Lancets Ultra Thin MISC 1 Device by Does not apply route 4 times daily 500 each 1    Everolimus 0.75 MG TABS 2 times daily 3 tabs 2 times daily      magnesium oxide (MAG-OX) 400 (241.3 Mg) MG TABS tablet TAKE 1 TABLET BY MOUTH TWICE A DAY 60 tablet 2    tacrolimus (PROGRAF) 1 MG capsule Take 6 mg by mouth 2 times daily       famotidine (PEPCID) 20 MG tablet Take 1 tablet by mouth 2 times daily 60 tablet 5    gabapentin (NEURONTIN) 100 MG capsule Take 1 capsule by mouth 3 times daily for 30 days. (Patient taking differently: Take 100 mg by mouth 2 times daily. ) 180 capsule 0     No current facility-administered medications for this visit. Allergies:      Allergies   Allergen Reactions    Amino Acids Nausea Only    Lisinopril Nausea Only        Medical History:     Past Medical History:   Diagnosis Date    Anemia     CAD (coronary artery disease)     ([facility])    CKD (chronic kidney disease) stage 4, GFR 15-29 ml/min ([facility])     COVID-19 03/2020    Elevated PSA, between 10 and less than 20 ng/ml     End stage kidney disease ([facility]     Hemodialysis access, fistula mature ([facility]     right arm    Hemodialysis patient [facility])     patient had Kidney transplant-no longer on dialysis    Hepatitis C without hepatic coma     Hx of inguinal hernia repair     Hyperlipidemia     Hypertension     Irregular heart rate     wearing holter monitor 1/29/21    Leukopenia 01/29/2021    Palpitations     Prostate cancer ([facility]     prostate    [facility]    [facility]    Thrombocytopenia ([facility] 01/29/2021    Type 2 diabetes mellitus ([facility]     Under care of team 05/04/2021    [facility]phrology-[facility]-last visit april 2021    Under care of team 05/04/2021    [facility] -last visit onc 2020    Wellness examination 05/04/2021    pcp-Dr [facility]-last visit march 2021       Past Surgical History:   Procedure Laterality Date    CARDIAC CATHETERIZATION      Patient states normal    COLONOSCOPY N/A 10/30/2019    COLONOSCOPY WITH BIOPSY performed by Jeanetta Pallas, MD at [address] [facility] RENAL  8/6/2021    CT BIOPSY RENAL    CYSTOSCOPY Right 05/19/2021     CYSTOSCOPY URETERAL STENT INSERTION (Right )    CYSTOSCOPY Right 5/19/2021    CYSTOSCOPY performed by Afton Holstein, MD at [address] [facility] Right 11/2015    at [facility]    left inguinal    KIDNEY TRANSPLANT  2017    right    LIVER BIOPSY      PROSTATE BIOPSY N/A 2/12/2021    PROSTATE BIOPSY WITH ULTRASOUND TO OR WITH TECH performed by Silvia Stoll MD at [address] [facility]  05/19/2021    XI ROBOTIC LAPAROSCOPIC PROSTATECTOMY WITH STANDARD PELVIC LYMPHNODE DISSECTION AND LEFT PELVIC LYMPHNODE DISSECTION     PROSTATECTOMY N/A 5/19/2021    XI ROBOTIC LAPAROSCOPIC PROSTATECTOMY DISSECTION performed by Afton Holstein, MD at [facility]  2/12/2021    US PROSTATE NEEDLE PUNCH 2/12/2021 [facility] ULTRASOUND       Family History   Problem Relation Age of Onset    Cancer Mother         Lymphoma    Lung Cancer Father     Colon Cancer Brother         Social History:     Social History     Socioeconomic History    Marital status:      Spouse name: Not on file    Number of children: Not on file    Years of education: Not on file    Highest education level: Not on file   Occupational History    Not on file   Tobacco Use    Smoking status: Former Smoker     Packs/day: 0.50     Years: 50.00     Pack years: 25.00     Types: Cigarettes     Quit date: 2013     Years since quittin.6    Smokeless tobacco: Never Used    Tobacco comment: No smoking in at least 3 years   Vaping Use    Vaping Use: Never used   Substance and Sexual Activity    Alcohol use: No     Comment: No ETOH in 3 years    Drug use: Not Currently     Types: Other-see comments     Comment: Hx of marijuana and crack cocaine previously - last use was about 3 years ago as of 22    Sexual activity: Never   Other Topics Concern    Not on file   Social History Narrative    Not on file     Social Determinants of Health     Financial Resource Strain: Low Risk     Difficulty of Paying Living Expenses: Not hard at all   Food Insecurity: No Food Insecurity    Worried About [address] [email or web address] in the Last Year: Never true    [facility] in the Last Year: Never true   Transportation Needs:     Lack of Transportation (Medical): Not on file    Lack of Transportation (Non-Medical):  Not on file   Physical Activity:     Days of Exercise per Week: Not on file    Minutes of Exercise per Session: Not on file   Stress:     Feeling of Stress : Not on file   Social Connections:     Frequency of Communication with Friends and Family: Not on file    Frequency of Social Gatherings with Friends and Family: Not on file    Attends Restorationism Services: Not on file    Active Member of Clubs or Organizations: Not on file    Attends Club or Organization Meetings: Not on file    Marital Status: Not on file   Intimate Partner Violence:     Fear of Current or Ex-Partner: Not on file    Emotionally Abused: Not on file    Physically Abused: Not on file    Sexually Abused: Not on file   Housing Stability:     Unable to Pay for Housing in the Last Year: Not on file    Number of Places Lived in the Last Year: Not on file    Unstable Housing in the Last Year: Not on file        ROS:     Constitutional: No fevers, chills, fatigue. ENT: No nasal congestion or sore throat  Respiratory: No difficulty in breathing or cough. Cardiovascular: No chest pain, no palpitations, no shortness of breath  Gastrointestinal: No abdominal pain or change in bowel movements. Genitourinary: No change in urinary frequency or dysuria. Skin: no skin lesions or rashes  Neurological: No weakness. No headaches. Last Filed Vitals:  /72   Pulse 84   Wt 174 lb (78.9 kg)   SpO2 98%   BMI 22.34 kg/m²      Physical Examination:     GENERAL APPEARANCE: in no acute distress, well developed, well nourished. HEAD: normocephalic, atraumatic. EYES: extraocular movement intact (EOMI), pupils equal, round, reactive to light and accommodation. EARS: normal, tympanic membrane intact, clear, auditory canal clear. NOSE: nares patent, no erythema, sinuses nontender bilaterally, no rhinorrhea. ORAL CAVITY: mucosa moist, no lesions. THROAT: clear, no mass, no exudate. NECK/THYROID: neck supple, full range of motion, no thyromegaly. HEART: no murmurs, regular rate and rhythm, S1, S2 normal.   LUNGS: clear to auscultation bilaterally, no wheezes, rales, rhonchi. ABDOMEN: normal, bowel sounds present, soft, nontender, nondistended, no rebound guarding or rigidity    Recent Labs/ In Office Testing/ Radiograph review:     No results found for this visit on 12/07/21. Assessment/Plan:     Yesica Vela was seen today for other, diabetes, hyperlipidemia and hypertension.     Diagnoses and all orders for this visit:    Type 2 diabetes mellitus with hyperglycemia, with long-term current use of insulin ([facility]  -     Hemoglobin A1C; Future  -     Microalbumin, Ur; Future  -     blood glucose monitor strips; Test blood sugar 4 times daily    End-stage renal disease (HCC)  -     Magnesium; Future  -     Renal Function Panel; Future    HTN, goal below 130/80  -     Magnesium; Future    Dyslipidemia  -     ALT; Future  -     AST; Future  -     CBC Auto Differential; Future  -     Lipid Panel; Future  -     TSH with Reflex; Future    Medication refill  -     acetaminophen (TYLENOL) 500 MG tablet; Take 2 tablets by mouth 3 times daily    Follow up on labs. Encouraged well balanced low carb diet. Encouraged 150 mins of aerobic activity weekly. Encouraged regular follow up with his specialists. Continue current medical regimen. Refills as above. All questions answered and addressed to patient satisfaction. Patient understands and agrees to the plan. The patient was evaluated and treated today based on the osteopathic principle that each person is a unit of body, mind, and spirit, the body is capable of self-regulation, self-healing, and health maintenance and that structure and function are reciprocally interrelated. Follow-up:   Return in about 4 months (around 4/7/2022) for AMV; 40 min appt.       Roque Mancera D.O.

## 2021-12-08 RX ORDER — TACROLIMUS 1 MG/1
6 CAPSULE ORAL 2 TIMES DAILY
Qty: 60 CAPSULE | Refills: 0 | OUTPATIENT
Start: 2021-12-08

## 2021-12-16 DIAGNOSIS — E11.65 TYPE 2 DIABETES MELLITUS WITH HYPERGLYCEMIA, WITH LONG-TERM CURRENT USE OF INSULIN (HCC): ICD-10-CM

## 2021-12-16 DIAGNOSIS — Z79.4 TYPE 2 DIABETES MELLITUS WITH HYPERGLYCEMIA, WITH LONG-TERM CURRENT USE OF INSULIN (HCC): ICD-10-CM

## 2021-12-16 RX ORDER — GLUCOSAMINE HCL/CHONDROITIN SU 500-400 MG
CAPSULE ORAL
Qty: 500 STRIP | Refills: 1 | Status: SHIPPED | OUTPATIENT
Start: 2021-12-16 | End: 2022-02-12 | Stop reason: SDUPTHER

## 2021-12-16 NOTE — TELEPHONE ENCOUNTER
Brody Mendoza is calling to request a refill on the following medication(s):    Medication Request:  Requested Prescriptions     Pending Prescriptions Disp Refills    blood glucose monitor strips 500 strip 1     Sig: Test blood sugar 4 times daily       Last Visit Date (If Applicable):  61/5/6653    Next Visit Date:    4/8/2022

## 2022-01-14 ENCOUNTER — TELEPHONE (OUTPATIENT)
Dept: FAMILY MEDICINE CLINIC | Age: 72
End: 2022-01-14

## 2022-01-14 NOTE — TELEPHONE ENCOUNTER
Received notification from [facility] stating one touch test strips need a PA. Did Pa through [employer] which stated that diabetic products need to be ran through part B not part D. Notified Javed Ledesma at [facility] care pharmacy. She stated she will notified the patient. They are not able to run it through part B since they are a smaller pharmacy.

## 2022-02-03 NOTE — PROGRESS NOTES
Patient given Rx for glasses. Progress Note    Kedar Toro is a 72 y.o.  male who presents today alone for evaluation of   Chief Complaint   Patient presents with    Blood Pressure Check    Hypertension    Hyperlipidemia    Diabetes           HPI:   Patient is here for HTN, hypertensive urgency, DM type 2, and dyslipidemia follow up. Patient BP was noted to be roughly 180-190/80-90 in the ED on 3/13/2020. He states he was feeling well, however had some mild lightheadedness which provoked him to present to the ED. Patient denies cp/sob/le edema/dizziness/lightheadedness/blurry va/ha. Patient states he is taking all of his medications as directed. Patient states he is seeing his nephrologist regularly. Patient states he will be seeing his nephrologist in the next week. Patient did recently have his procardia increased to 60 mg. Patient last HbA1c 12/18/19 7.5. Patient is using trulicity as directed. He does take 15 units of his long acting insulin twice daily. Patient takes 12 units of Humalog three times daily. Patient states he is attempting to monitor his carb intake. Patient denies exertional calf cramping. Patient denies polyuria/polyphagia/polydipsia. Patient reports Sydnee Rico in the last year.      Health Maintenance Due   Topic Date Due    Hepatitis A vaccine (1 of 2 - Risk 2-dose series) 01/04/1951    Hepatitis B vaccine (1 of 3 - Risk 3-dose series) 01/04/1970    Shingles Vaccine (1 of 2) 01/04/2001    Diabetic retinal exam  08/16/2017    Annual Wellness Visit (AWV)  10/10/2019        Current Medications:     Current Outpatient Medications   Medication Sig Dispense Refill    NIFEdipine (PROCARDIA XL) 90 MG extended release tablet Take 1 tablet by mouth daily 90 tablet 0    meclizine (ANTIVERT) 25 MG tablet Take 1 tablet by mouth 3 times daily as needed for Dizziness 30 tablet 0    Insulin Pen Needle (ULTICARE MINI PEN NEEDLES) 31G X 6 MM MISC PATIENT IS TESTING UP TO 5 TIMES DAILY 100 each 3    ZORTRESS 1 MG TABS       HUMALOG KWIKPEN 100 UNIT/ML SOPN       atorvastatin (LIPITOR) 40 MG tablet TAKE 1 TABLET BY MOUTH DAILY 90 tablet 0    magnesium oxide (MAG-OX) 400 MG tablet Take 1 tablet by mouth 2 times daily 180 tablet 0    metoprolol succinate (TOPROL XL) 25 MG extended release tablet TAKE 1 TABLET BY MOUTH ONCE DAILY 90 tablet 0    tamsulosin (FLOMAX) 0.4 MG capsule Take 1 capsule by mouth daily 90 capsule 0    gabapentin (NEURONTIN) 100 MG capsule Take 1 capsule by mouth 2 times daily for 90 days. 180 capsule 0    Dulaglutide (TRULICITY) 6.74 DP/8.3GL SOPN Inject 0.75 mg into the skin once a week 4 pen 2    FREESTYLE LITE strip USE TO TEST TWICE A DAY AS DIRECTED 100 each 3    insulin lispro (HUMALOG KWIKPEN) 100 UNIT/ML pen Per Sliding scale. Max 28 units per day 3 pen 5    insulin glargine (LANTUS SOLOSTAR) 100 UNIT/ML injection pen Inject 18 Units into the skin 2 times daily 4 pen 5    tacrolimus (PROGRAF) 1 MG capsule Take 6 mg by mouth 2 times daily      glucose monitoring kit (FREESTYLE) monitoring kit Pt testing bid. True test glucometer 1 kit 0    Insulin Pen Needle (COMFORT EZ PEN NEEDLES) 32G X 6 MM MISC USE AS DIRECTED WITH INSULIN PEN(S) UP TO 5 TIMES A  each 3     No current facility-administered medications for this visit. Allergies:      Allergies   Allergen Reactions    Amino Acids     Lisinopril         Medical History:     Past Medical History:   Diagnosis Date    Diabetes mellitus ([address]     Hemodialysis patient [facility])     patient had Kidney transplant    Hepatitis C     Hepatitis C without hepatic coma     Hyperlipidemia     Hypertension        Past Surgical History:   Procedure Laterality Date    COLONOSCOPY N/A 10/30/2019    COLONOSCOPY WITH BIOPSY performed by Mariah Wyatt MD at [address] [place] [address] Right 11/2015    at [facility] [address] [address]  2017       Family History   Problem Relation Age of Onset    Cancer Mother     Cancer Father     Cancer Brother         Social History:     Social History     Socioeconomic History    Marital status:      Spouse name: Not on file    Number of children: Not on file    Years of education: Not on file    Highest education level: Not on file   Occupational History    Not on file   Social Needs    Financial resource strain: Somewhat hard    Food insecurity     Worry: Often true     Inability: Often true    Transportation needs     Medical: Yes     Non-medical: Yes   Tobacco Use    Smoking status: Former Smoker     Packs/day: 0.50     Years: 50.00     Pack years: 25.00     Types: Cigarettes     Last attempt to quit: 2013     Years since quittin.9    Smokeless tobacco: Never Used   Substance and Sexual Activity    Alcohol use: No    Drug use: Not Currently     Types: Other-see comments     Comment: used marijuana and crack cocaine previously - last use over one year ago    Sexual activity: Never   Lifestyle    Physical activity     Days per week: Not on file     Minutes per session: Not on file    Stress: Not on file   Relationships    Social connections     Talks on phone: Not on file     Gets together: Not on file     Attends Alevism service: Not on file     Active member of club or organization: Not on file     Attends meetings of clubs or organizations: Not on file     Relationship status: Not on file    Intimate partner violence     Fear of current or ex partner: Not on file     Emotionally abused: Not on file     Physically abused: Not on file     Forced sexual activity: Not on file   Other Topics Concern    Not on file   Social History Narrative    Not on file        ROS:     Constitutional: No fevers, chills, fatigue. ENT: No nasal congestion or sore throat  Respiratory: No difficulty in breathing or cough.    Cardiovascular: No chest pain, palpitations or shortness of breath  Gastrointestinal: No abdominal pain or change in mL/min/1.73sq m  Kidney failure:   <15 mL/min/1.73sq m              eGFR calculated using average adult body mass.  Additional eGFR calculator available at:        [email or web address]            GFR Staging 03/13/2020 NOT REPORTED   Final    WBC 03/13/2020 3.8  3.5 - 11.0 k/uL Final    RBC 03/13/2020 3.19* 4.5 - 5.9 m/uL Final    Hemoglobin 03/13/2020 9.7* 13.5 - 17.5 g/dL Final    Hematocrit 03/13/2020 28.5* 41 - 53 % Final    MCV 03/13/2020 89.4  80 - 100 fL Final    MCH 03/13/2020 30.5  26 - 34 pg Final    MCHC 03/13/2020 34.2  31 - 37 g/dL Final    RDW 03/13/2020 12.9  11.5 - 14.9 % Final    Platelets 55/61/1653 152  150 - 450 k/uL Final    MPV 03/13/2020 8.3  6.0 - 12.0 fL Final    NRBC Automated 03/13/2020 NOT REPORTED  per 100 WBC Final    Differential Type 03/13/2020 NOT REPORTED   Final    Immature Granulocytes 03/13/2020 NOT REPORTED  0 % Final    Absolute Immature Granulocyte 03/13/2020 NOT REPORTED  0.00 - 0.30 k/uL Final    WBC Morphology 03/13/2020 NOT REPORTED   Final    RBC Morphology 03/13/2020 NOT REPORTED   Final    Platelet Estimate 43/11/9562 NOT REPORTED   Final    Seg Neutrophils 03/13/2020 74* 36 - 66 % Final    Lymphocytes 03/13/2020 8* 24 - 44 % Final    Monocytes 03/13/2020 15* 1 - 7 % Final    Eosinophils % 03/13/2020 2  0 - 4 % Final    Basophils 03/13/2020 1  0 - 2 % Final    Segs Absolute 03/13/2020 2.81  1.3 - 9.1 k/uL Final    Absolute Lymph # 03/13/2020 0.30* 1.0 - 4.8 k/uL Final    Absolute Mono # 03/13/2020 0.57  0.1 - 1.3 k/uL Final    Absolute Eos # 03/13/2020 0.08  0.0 - 0.4 k/uL Final    Basophils Absolute 03/13/2020 0.04  0.0 - 0.2 k/uL Final    Morphology 03/13/2020 SLT ROULEAU   Final    Ventricular Rate 03/13/2020 86  BPM Final    Atrial Rate 03/13/2020 86  BPM Final    P-R Interval 03/13/2020 208  ms Final    QRS Duration 03/13/2020 92  ms Final    Q-T Interval 03/13/2020 394  ms Final    QTc Calculation lead  -     NIFEdipine (PROCARDIA XL) 90 MG extended release tablet; Take 1 tablet by mouth daily  -     Magnesium; Future  -     Renal Function Panel; Future    Hypertensive urgency  -     EKG 12 lead  -     Renal Function Panel; Future    Dyslipidemia  -     ALT; Future  -     AST; Future  -     CBC Auto Differential; Future  -     Lipid Panel; Future    Type 2 diabetes mellitus with hyperglycemia, with long-term current use of insulin (HCC)  -     Hemoglobin A1C; Future  -     Microalbumin, Ur; Future    Elevated troponin  -     ECHO Stress Test; Future  -     ADOLPH - Elroy Olivares MD, Cardiology, [facility]    Chest discomfort  -     ECHO Stress Test; Future  -     ADOLPH Olivares MD, Cardiology, [facility]    Electrocardiogram showing T wave abnormalities  -     ECHO Stress Test; Future  -     ADOLPH Olivares MD, Cardiology, [facility]    Continue current medical regimen with insulin and trulicity. Follow up on labs. Increase Procardia XL for his BP to 90 mg. Reviewed EKG with patient. Stress echo for elevated cardiac markers in ED as well as intermittent chest discomfort. I did ask him to avoid foods high in sodium and to avoid OTC stimulants. Troponin elevation may be related to acute increase in BP, however he has multiple risk factors for cardiac disease. Stress testing is warranted in this case. Further in light of new non-specific T wave changes, this patient warrants a stress test. Referral to cardiology provided as well. EKG: normal sinus rhythm, nonspecific ST and T waves changes, these findings are new since last EKG, rate 89, normal axis, QTc WNL. .    All questions answered and addressed to patient satisfaction. Patient understands and agrees to the plan.      The patient was evaluated and treated today based on the osteopathic principle that each person is a unit of body, mind, and spirit, the body is capable of self-regulation, self-healing, and health maintenance and that structure and function are

## 2022-02-09 ENCOUNTER — HOSPITAL ENCOUNTER (OUTPATIENT)
Age: 72
Setting detail: SPECIMEN
Discharge: HOME | End: 2022-02-09

## 2022-02-09 DIAGNOSIS — I10 HTN, GOAL BELOW 130/80: ICD-10-CM

## 2022-02-09 DIAGNOSIS — D64.9 ANEMIA, UNSPECIFIED TYPE: ICD-10-CM

## 2022-02-09 DIAGNOSIS — C61 PROSTATE CANCER (HCC): ICD-10-CM

## 2022-02-09 DIAGNOSIS — E78.5 DYSLIPIDEMIA: ICD-10-CM

## 2022-02-09 DIAGNOSIS — N18.6 END-STAGE RENAL DISEASE (HCC): ICD-10-CM

## 2022-02-09 DIAGNOSIS — Z79.4 TYPE 2 DIABETES MELLITUS WITH HYPERGLYCEMIA, WITH LONG-TERM CURRENT USE OF INSULIN (HCC): ICD-10-CM

## 2022-02-09 DIAGNOSIS — D61.818 PANCYTOPENIA (HCC): ICD-10-CM

## 2022-02-09 DIAGNOSIS — E11.65 TYPE 2 DIABETES MELLITUS WITH HYPERGLYCEMIA, WITH LONG-TERM CURRENT USE OF INSULIN (HCC): ICD-10-CM

## 2022-02-09 LAB
ABSOLUTE EOS #: 0.04 K/UL (ref 0–0.44)
ABSOLUTE EOS #: 0.04 K/UL (ref 0–0.44)
ABSOLUTE IMMATURE GRANULOCYTE: <0.03 K/UL (ref 0–0.3)
ABSOLUTE IMMATURE GRANULOCYTE: <0.03 K/UL (ref 0–0.3)
ABSOLUTE LYMPH #: 1.06 K/UL (ref 1.1–3.7)
ABSOLUTE LYMPH #: 1.12 K/UL (ref 1.1–3.7)
ABSOLUTE MONO #: 0.62 K/UL (ref 0.1–1.2)
ABSOLUTE MONO #: 0.66 K/UL (ref 0.1–1.2)
ALBUMIN SERPL-MCNC: 4.2 G/DL (ref 3.5–5.2)
ALT SERPL-CCNC: 26 U/L (ref 5–41)
ANION GAP SERPL CALCULATED.3IONS-SCNC: 13 MMOL/L (ref 9–17)
AST SERPL-CCNC: 21 U/L
BASOPHILS # BLD: 0 % (ref 0–2)
BASOPHILS # BLD: 0 % (ref 0–2)
BASOPHILS ABSOLUTE: <0.03 K/UL (ref 0–0.2)
BASOPHILS ABSOLUTE: <0.03 K/UL (ref 0–0.2)
BUN BLDV-MCNC: 83 MG/DL (ref 8–23)
BUN/CREAT BLD: ABNORMAL (ref 9–20)
CALCIUM SERPL-MCNC: 9 MG/DL (ref 8.6–10.4)
CHLORIDE BLD-SCNC: 108 MMOL/L (ref 98–107)
CHOLESTEROL/HDL RATIO: 3.6
CHOLESTEROL: 237 MG/DL
CO2: 21 MMOL/L (ref 20–31)
CREAT SERPL-MCNC: 6.1 MG/DL (ref 0.7–1.2)
CREATININE URINE: 112.4 MG/DL (ref 39–259)
DIFFERENTIAL TYPE: ABNORMAL
DIFFERENTIAL TYPE: ABNORMAL
EOSINOPHILS RELATIVE PERCENT: 1 % (ref 1–4)
EOSINOPHILS RELATIVE PERCENT: 1 % (ref 1–4)
ESTIMATED AVERAGE GLUCOSE: 197 MG/DL
FOLATE: 7.3 NG/ML
GFR AFRICAN AMERICAN: 11 ML/MIN
GFR NON-AFRICAN AMERICAN: 9 ML/MIN
GFR SERPL CREATININE-BSD FRML MDRD: ABNORMAL ML/MIN/{1.73_M2}
GFR SERPL CREATININE-BSD FRML MDRD: ABNORMAL ML/MIN/{1.73_M2}
GLUCOSE BLD-MCNC: 62 MG/DL (ref 70–99)
HBA1C MFR BLD: 8.5 % (ref 4–6)
HCT VFR BLD CALC: 32.6 % (ref 40.7–50.3)
HCT VFR BLD CALC: 32.9 % (ref 40.7–50.3)
HDLC SERPL-MCNC: 65 MG/DL
HEMOGLOBIN: 10.2 G/DL (ref 13–17)
HEMOGLOBIN: 10.4 G/DL (ref 13–17)
IMMATURE GRANULOCYTES: 0 %
IMMATURE GRANULOCYTES: 1 %
IRON SATURATION: 33 % (ref 20–55)
IRON: 71 UG/DL (ref 59–158)
LDL CHOLESTEROL: 150 MG/DL (ref 0–130)
LYMPHOCYTES # BLD: 29 % (ref 24–43)
LYMPHOCYTES # BLD: 30 % (ref 24–43)
MAGNESIUM: 2.2 MG/DL (ref 1.6–2.6)
MCH RBC QN AUTO: 29.2 PG (ref 25.2–33.5)
MCH RBC QN AUTO: 29.5 PG (ref 25.2–33.5)
MCHC RBC AUTO-ENTMCNC: 31.3 G/DL (ref 28.4–34.8)
MCHC RBC AUTO-ENTMCNC: 31.6 G/DL (ref 28.4–34.8)
MCV RBC AUTO: 93.2 FL (ref 82.6–102.9)
MCV RBC AUTO: 93.4 FL (ref 82.6–102.9)
MICROALBUMIN/CREAT 24H UR: 3295 MG/L
MICROALBUMIN/CREAT UR-RTO: 2931 MCG/MG CREAT
MONOCYTES # BLD: 17 % (ref 3–12)
MONOCYTES # BLD: 18 % (ref 3–12)
NRBC AUTOMATED: 0 PER 100 WBC
NRBC AUTOMATED: 0 PER 100 WBC
PDW BLD-RTO: 14.3 % (ref 11.8–14.4)
PDW BLD-RTO: 14.4 % (ref 11.8–14.4)
PHOSPHORUS: 5.1 MG/DL (ref 2.5–4.5)
PLATELET # BLD: 118 K/UL (ref 138–453)
PLATELET # BLD: 120 K/UL (ref 138–453)
PLATELET ESTIMATE: ABNORMAL
PLATELET ESTIMATE: ABNORMAL
PMV BLD AUTO: 11.6 FL (ref 8.1–13.5)
PMV BLD AUTO: 12 FL (ref 8.1–13.5)
POTASSIUM SERPL-SCNC: 4.8 MMOL/L (ref 3.7–5.3)
PROSTATE SPECIFIC ANTIGEN: <0.02 UG/L
RBC # BLD: 3.49 M/UL (ref 4.21–5.77)
RBC # BLD: 3.53 M/UL (ref 4.21–5.77)
RBC # BLD: ABNORMAL 10*6/UL
RBC # BLD: ABNORMAL 10*6/UL
SEG NEUTROPHILS: 50 % (ref 36–65)
SEG NEUTROPHILS: 53 % (ref 36–65)
SEGMENTED NEUTROPHILS ABSOLUTE COUNT: 1.84 K/UL (ref 1.5–8.1)
SEGMENTED NEUTROPHILS ABSOLUTE COUNT: 1.88 K/UL (ref 1.5–8.1)
SODIUM BLD-SCNC: 142 MMOL/L (ref 135–144)
TOTAL IRON BINDING CAPACITY: 212 UG/DL (ref 250–450)
TRIGL SERPL-MCNC: 111 MG/DL
TSH SERPL DL<=0.05 MIU/L-ACNC: 2.92 MIU/L (ref 0.3–5)
UNSATURATED IRON BINDING CAPACITY: 141 UG/DL (ref 112–347)
VITAMIN B-12: 1480 PG/ML (ref 232–1245)
VLDLC SERPL CALC-MCNC: ABNORMAL MG/DL (ref 1–30)
WBC # BLD: 3.6 K/UL (ref 3.5–11.3)
WBC # BLD: 3.7 K/UL (ref 3.5–11.3)
WBC # BLD: ABNORMAL 10*3/UL
WBC # BLD: ABNORMAL 10*3/UL

## 2022-02-11 ENCOUNTER — TELEPHONE (OUTPATIENT)
Dept: FAMILY MEDICINE CLINIC | Age: 72
End: 2022-02-11

## 2022-02-11 DIAGNOSIS — Z79.4 TYPE 2 DIABETES MELLITUS WITH HYPERGLYCEMIA, WITH LONG-TERM CURRENT USE OF INSULIN (HCC): ICD-10-CM

## 2022-02-11 DIAGNOSIS — E11.65 TYPE 2 DIABETES MELLITUS WITH HYPERGLYCEMIA, WITH LONG-TERM CURRENT USE OF INSULIN (HCC): ICD-10-CM

## 2022-02-11 NOTE — TELEPHONE ENCOUNTER
----- Message from Leticia Warren sent at 2/11/2022 11:39 AM EST -----  Subject: Message to Provider    QUESTIONS  Information for Provider? Pt need's Test strips for Trumetri diabetic   machine and Strips. [place], Would like a call   back how to receive Test strips.   ---------------------------------------------------------------------------  --------------  CALL BACK INFO  What is the best way for the office to contact you? OK to leave message on   voicemail  Preferred Call Back Phone Number? [phone]  ---------------------------------------------------------------------------  --------------  SCRIPT ANSWERS  Relationship to Patient?  Self

## 2022-02-12 RX ORDER — GLUCOSAMINE HCL/CHONDROITIN SU 500-400 MG
CAPSULE ORAL
Qty: 500 STRIP | Refills: 1 | Status: SHIPPED | OUTPATIENT
Start: 2022-02-12 | End: 2022-02-14 | Stop reason: SDUPTHER

## 2022-02-14 DIAGNOSIS — Z79.4 TYPE 2 DIABETES MELLITUS WITH HYPERGLYCEMIA, WITH LONG-TERM CURRENT USE OF INSULIN (HCC): ICD-10-CM

## 2022-02-14 DIAGNOSIS — E11.65 TYPE 2 DIABETES MELLITUS WITH HYPERGLYCEMIA, WITH LONG-TERM CURRENT USE OF INSULIN (HCC): ICD-10-CM

## 2022-02-14 LAB
HEPATITIS C RNA PCR QUANT: NOT DETECTED
HEPATITIS C RNA QUANT LOG: NORMAL LOG IU/ML
HEPATITIS C RNA-PCR: NORMAL IU/ML
SOURCE: NORMAL

## 2022-02-14 RX ORDER — GLUCOSAMINE HCL/CHONDROITIN SU 500-400 MG
CAPSULE ORAL
Qty: 500 STRIP | Refills: 1 | Status: SHIPPED | OUTPATIENT
Start: 2022-02-14

## 2022-02-14 NOTE — TELEPHONE ENCOUNTER
----- Message from Shahnaz sent at 2/14/2022 12:51 PM EST -----  Subject: Refill Request    QUESTIONS  Name of Medication? blood glucose monitor strips  Patient-reported dosage and instructions? 500 for 125 days. test blood   sugar 4 times daily. How many days do you have left? 0  Preferred Pharmacy? [id number] phone number (if available)? [phone]  ---------------------------------------------------------------------------  --------------  CALL BACK INFO  What is the best way for the office to contact you? OK to leave message on   voicemail  Preferred Call Back Phone Number?  [phone]

## 2022-02-16 ENCOUNTER — HOSPITAL ENCOUNTER (OUTPATIENT)
Facility: MEDICAL CENTER | Age: 72
End: 2022-02-16

## 2022-02-21 DIAGNOSIS — D61.818 PANCYTOPENIA (HCC): Primary | ICD-10-CM

## 2022-03-07 ENCOUNTER — TELEPHONE (OUTPATIENT)
Dept: FAMILY MEDICINE CLINIC | Age: 72
End: 2022-03-07

## 2022-03-07 NOTE — TELEPHONE ENCOUNTER
Nena from [address] [place] [address] called and is asking if pcp would follow pt.  Thank you

## 2022-03-09 ENCOUNTER — NURSE TRIAGE (OUTPATIENT)
Dept: OTHER | Age: 72
End: 2022-03-09

## 2022-03-09 NOTE — TELEPHONE ENCOUNTER
Patient calls and reports that he was discharged from [facility] yesterday after admission for heart failure. Patient states that this morning he had vomiting and a blood sugar of 300. Now patient states that his meter will not read his blood sugar and that his meter does not read when more that 600. Patient given care advice per care guideline and patient states that he will call someone to drive him to [facility].      Reason for Disposition   Blood glucose > 500 mg/dL (27.8 mmol/L)    Protocols used: DIABETES - HIGH BLOOD SUGAR-ADULT-AH

## 2022-03-10 ENCOUNTER — TELEPHONE (OUTPATIENT)
Dept: FAMILY MEDICINE CLINIC | Age: 72
End: 2022-03-10

## 2022-03-10 NOTE — TELEPHONE ENCOUNTER
----- Message from Mariana Buchanan sent at 3/10/2022 12:44 PM EST -----  Subject: Appointment Request    Reason for Call: Routine Hospital Follow Up    QUESTIONS  Type of Appointment? Established Patient  Reason for appointment request? Available appointments did not meet   patient need  Additional Information for Provider? Pt getting discharged from  [address] on 3/10 and needs an appt within a week. Was in hospital for   hyperglycemia and elevated troponon. ---------------------------------------------------------------------------  --------------  Unknown Dura INFO  What is the best way for the office to contact you? OK to leave message on   voicemail  Preferred Call Back Phone Number? [phone]  ---------------------------------------------------------------------------  --------------  SCRIPT ANSWERS  Relationship to Patient? Third Party  Representative Name? [facility])  (Patient requests to see provider urgently. )? No  (Has the patient been discharged from the hospital within 2 business days   AND does not have a Telephone Encounter  Follow Up From [address] [place] [address]   documented in [address]?)? No  Do you have any questions for your primary care provider that need to be   answered prior to your appointment? (Use RN Triage if question pertains to   anything on the red flag list)? No  (Patient needs follow up visit after hospital discharge) Book first   available appointment within 7 days OF DISCHARGE, if no appt, proceed to   book the next available time slot within 14 days OF DISCHARGE AND Send   Message to Provider. [facility] Follow Up appointment cannot be booked   beyond 14 Days and should result in a Message to Provider. ? Yes   Have you been diagnosed with, awaiting test results for, or told that you   are suspected of having COVID-19 (Coronavirus)? (If patient has tested   negative or was tested as a requirement for work, school, or travel and   not based on symptoms, answer no)? No  Within the past 10 days have you developed any of the following symptoms   (answer no if symptoms have been present longer than 10 days or began   more than 10 days ago)? Fever or Chills, Cough, Shortness of breath or   difficulty breathing, Loss of taste or smell, Sore throat, Nasal   congestion, Sneezing or runny nose, Fatigue or generalized body aches   (answer no if pain is specific to a body part e.g. back pain), Diarrhea,   Headache? No  Have you had close contact with someone with COVID-19 in the last 7 days? No  (Service Expert  click yes below to proceed with [employer] As Usual   Scheduling)?  Yes

## 2022-03-23 DIAGNOSIS — Z76.0 MEDICATION REFILL: ICD-10-CM

## 2022-03-23 DIAGNOSIS — E11.65 TYPE 2 DIABETES MELLITUS WITH HYPERGLYCEMIA, WITH LONG-TERM CURRENT USE OF INSULIN (HCC): ICD-10-CM

## 2022-03-23 DIAGNOSIS — Z79.4 TYPE 2 DIABETES MELLITUS WITH HYPERGLYCEMIA, WITH LONG-TERM CURRENT USE OF INSULIN (HCC): ICD-10-CM

## 2022-03-23 PROCEDURE — 3052F HG A1C>EQUAL 8.0%<EQUAL 9.0%: CPT | Performed by: FAMILY MEDICINE

## 2022-03-23 RX ORDER — ATORVASTATIN CALCIUM 40 MG/1
TABLET, FILM COATED ORAL
Qty: 30 TABLET | Refills: 3 | Status: SHIPPED | OUTPATIENT
Start: 2022-03-23 | End: 2022-07-14

## 2022-03-23 RX ORDER — INSULIN LISPRO 100 [IU]/ML
INJECTION, SOLUTION INTRAVENOUS; SUBCUTANEOUS
Qty: 15 ML | Refills: 3 | Status: SHIPPED | OUTPATIENT
Start: 2022-03-23 | End: 2022-04-08

## 2022-03-23 NOTE — TELEPHONE ENCOUNTER
Laine Medeiros is calling to request a refill on the following medication(s):    Medication Request:  Requested Prescriptions     Pending Prescriptions Disp Refills    atorvastatin (LIPITOR) 40 MG tablet [Pharmacy Med Name: Atorvastatin Calcium 40MG TABS] 30 tablet      Sig: TAKE 1 TABLET BY MOUTH DAILY    insulin lispro, 1 Unit Dial, 100 UNIT/ML SOPN [Pharmacy Med Name: Insulin Lispro (1 Unit Dial) 100UNIT/ML SOPN] 15 mL      Sig: INJECT 15 UNITS INTO THE SKIN THREE TIMES A DAY BEFORE MEALS       Last Visit Date (If Applicable):  98/8/1689    Next Visit Date:    4/8/2022

## 2022-04-01 ENCOUNTER — TELEPHONE (OUTPATIENT)
Dept: FAMILY MEDICINE CLINIC | Age: 72
End: 2022-04-01

## 2022-04-01 ENCOUNTER — OFFICE VISIT (OUTPATIENT)
Dept: FAMILY MEDICINE CLINIC | Age: 72
End: 2022-04-01
Payer: COMMERCIAL

## 2022-04-01 VITALS
OXYGEN SATURATION: 98 % | BODY MASS INDEX: 21.06 KG/M2 | HEART RATE: 72 BPM | SYSTOLIC BLOOD PRESSURE: 110 MMHG | WEIGHT: 164 LBS | DIASTOLIC BLOOD PRESSURE: 70 MMHG

## 2022-04-01 DIAGNOSIS — E78.5 DYSLIPIDEMIA: ICD-10-CM

## 2022-04-01 DIAGNOSIS — N18.6 END-STAGE RENAL DISEASE (HCC): ICD-10-CM

## 2022-04-01 DIAGNOSIS — E11.65 TYPE 2 DIABETES MELLITUS WITH HYPERGLYCEMIA, WITH LONG-TERM CURRENT USE OF INSULIN (HCC): Primary | ICD-10-CM

## 2022-04-01 DIAGNOSIS — R73.9 HYPERGLYCEMIA: ICD-10-CM

## 2022-04-01 DIAGNOSIS — I10 HTN, GOAL BELOW 130/80: ICD-10-CM

## 2022-04-01 DIAGNOSIS — Z79.4 TYPE 2 DIABETES MELLITUS WITH HYPERGLYCEMIA, WITH LONG-TERM CURRENT USE OF INSULIN (HCC): Primary | ICD-10-CM

## 2022-04-01 DIAGNOSIS — Z91.199 NONCOMPLIANCE: ICD-10-CM

## 2022-04-01 PROCEDURE — 99214 OFFICE O/P EST MOD 30 MIN: CPT | Performed by: FAMILY MEDICINE

## 2022-04-01 PROCEDURE — G8427 DOCREV CUR MEDS BY ELIG CLIN: HCPCS | Performed by: FAMILY MEDICINE

## 2022-04-01 PROCEDURE — 4040F PNEUMOC VAC/ADMIN/RCVD: CPT | Performed by: FAMILY MEDICINE

## 2022-04-01 PROCEDURE — G8420 CALC BMI NORM PARAMETERS: HCPCS | Performed by: FAMILY MEDICINE

## 2022-04-01 PROCEDURE — 1123F ACP DISCUSS/DSCN MKR DOCD: CPT | Performed by: FAMILY MEDICINE

## 2022-04-01 PROCEDURE — 1111F DSCHRG MED/CURRENT MED MERGE: CPT | Performed by: FAMILY MEDICINE

## 2022-04-01 PROCEDURE — 3017F COLORECTAL CA SCREEN DOC REV: CPT | Performed by: FAMILY MEDICINE

## 2022-04-01 PROCEDURE — 1036F TOBACCO NON-USER: CPT | Performed by: FAMILY MEDICINE

## 2022-04-01 PROCEDURE — 3052F HG A1C>EQUAL 8.0%<EQUAL 9.0%: CPT | Performed by: FAMILY MEDICINE

## 2022-04-01 PROCEDURE — 2022F DILAT RTA XM EVC RTNOPTHY: CPT | Performed by: FAMILY MEDICINE

## 2022-04-01 RX ORDER — BUMETANIDE 1 MG/1
1 TABLET ORAL DAILY
Qty: 90 TABLET | Refills: 3
Start: 2022-04-01 | End: 2022-05-16 | Stop reason: SDUPTHER

## 2022-04-01 RX ORDER — LANCETS 26 GAUGE
1 EACH MISCELLANEOUS 4 TIMES DAILY
Qty: 500 EACH | Refills: 1 | Status: SHIPPED | OUTPATIENT
Start: 2022-04-01

## 2022-04-01 RX ORDER — INSULIN GLARGINE 100 [IU]/ML
12 INJECTION, SOLUTION SUBCUTANEOUS NIGHTLY
Qty: 5 PEN | Refills: 0
Start: 2022-04-01 | End: 2022-04-11 | Stop reason: SDUPTHER

## 2022-04-01 RX ORDER — GLUCOSAMINE HCL/CHONDROITIN SU 500-400 MG
CAPSULE ORAL
Qty: 350 STRIP | Refills: 5 | Status: SHIPPED | OUTPATIENT
Start: 2022-04-01

## 2022-04-01 ASSESSMENT — PATIENT HEALTH QUESTIONNAIRE - PHQ9
1. LITTLE INTEREST OR PLEASURE IN DOING THINGS: 0
SUM OF ALL RESPONSES TO PHQ QUESTIONS 1-9: 0
SUM OF ALL RESPONSES TO PHQ9 QUESTIONS 1 & 2: 0
SUM OF ALL RESPONSES TO PHQ QUESTIONS 1-9: 0
SUM OF ALL RESPONSES TO PHQ QUESTIONS 1-9: 0
2. FEELING DOWN, DEPRESSED OR HOPELESS: 0
SUM OF ALL RESPONSES TO PHQ QUESTIONS 1-9: 0

## 2022-04-01 NOTE — PROGRESS NOTES
Post-Discharge Transitional Care Follow Up      Maye Ross   YOB: 1950    Date of Office Visit:  4/1/2022  Date of Hospital Admission: 5/19/21  Date of Hospital Discharge: 5/21/21  Readmission Risk Score (high >=14%. Medium >=10%):No data recorded    Care management risk score Rising risk (score 2-5) and Complex Care (Scores >=6): 5     Non face to face  following discharge, date last encounter closed (first attempt may have been earlier): *No documented post hospital discharge outreach found in the last 14 days     Call initiated 2 business days of discharge: *No response recorded in the last 14 days     Type 2 diabetes mellitus with hyperglycemia, with long-term current use of insulin (HCC)  -     MS DISCHARGE MEDS RECONCILED W/ CURRENT OUTPATIENT MED LIST  -     insulin glargine (BASAGLAR KWIKPEN) 100 UNIT/ML injection pen; Inject 12 Units into the skin nightly, Disp-5 pen, R-0Adjust Sig  -     [facility]) Primary Care Pharmacist  -     Hemoglobin A1C; Future  -     Microalbumin, Ur; Future  -     blood glucose monitor strips; Test 4 times a day & as needed for symptoms of irregular blood glucose. Dispense sufficient amount for indicated testing frequency plus additional to accommodate PRN testing needs. , Disp-350 strip, R-5, Normal  -     Lancets Ultra Thin MISC; 4 TIMES DAILY Starting Fri 4/1/2022, Disp-500 each, R-1, Normal  Hyperglycemia  -     MS DISCHARGE MEDS RECONCILED W/ CURRENT OUTPATIENT MED LIST  Noncompliance  HTN, goal below 130/80  -     Renal Function Panel; Future  -     Magnesium; Future  End-stage renal disease (HCC)  -     bumetanide (BUMEX) 1 MG tablet; Take 1 tablet by mouth daily, Disp-90 tablet, R-3Adjust Sig  Dyslipidemia  -     CBC with Auto Differential; Future  -     Lipid Panel; Future  -     AST; Future  -     ALT; Future      Medical Decision Making: moderate complexity  Return if symptoms worsen or fail to improve.     On this date 4/1/2022 I have spent 30 minutes reviewing previous notes, test results and face to face with the patient discussing the diagnosis and importance of compliance with the treatment plan as well as documenting on the day of the visit. Reviewed DC summary. Patient referred to pharmacy to discuss dexcom vs bhavani device. Follow up on labs. Updated med list per DC summary. Lowered basaglar to 12 units nightly due to morning hypoglycemia. Refills as above. Subjective:   HPI:    Patient is here today for HDFU appt from [facility]. Patient was admitted 3/9 for hyperglycemia in the setting of ESRD and uncontrolled IDDM type 2. Patient was discharged 3/10. Patient had been missing his insulin doses. Patient states he is taking 15 units of basaglar nightly along with a SSI. Patient was noted to have pseudohyponatremia which improved with his blood sugar correction utilizing insulin. Patient was noted to have not been in DKA. Patient did receive HD during his stay. Patient experienced n/v/abd pain prior to presentation. Patient symptoms resolved with HD and correction of his hyperglycemia. Patient was seen by nephrology during his visit. Patient had his Bumex reduced to 1 mg daily. Patient states his FBG in the morning can be between 45-70. Patient states his postprandial sugars can be as high as 160. Patient states he needs refills of his testing supplies. Inpatient course: Discharge summary reviewed- see chart.     Interval history/Current status: stable    Patient Active Problem List   Diagnosis    Controlled type 2 diabetes mellitus with stage 2 chronic kidney disease, with long-term current use of insulin (HCC)    Elevated serum cholesterol    Renal insufficiency syndrome    Chronic hepatitis C (HCC)    Coronary arteriosclerosis    End-stage renal disease ([facility] [address]    Benign essential HTN    General patient noncompliance    Encounter for colonoscopy due to history of adenomatous colonic polyps    Family hx of colon cancer requiring screening colonoscopy    History of renal transplant    Hyperlipidemia    Tobacco user    Chronic diarrhea    Immunosuppressed status (HCC)    Elevated C-reactive protein (CRP)    Elevated LDH    Normochromic normocytic anemia    Abdominal pain    Acute kidney injury ([address]    Dizziness    Fever    Left ventricular hypertrophy    Lung mass    Pneumonia due to infectious organism    Elevated PSA, between 10 and less than 20 ng/ml    Prostate cancer [facility])       Medications listed as ordered at the time of discharge from hospital     Medication List          Accurate as of April 1, 2022  9:41 AM. If you have any questions, ask your nurse or doctor. CHANGE how you take these medications    Basaglar KwikPen 100 UNIT/ML injection pen  Generic drug: insulin glargine  Inject 12 Units into the skin nightly  What changed: how much to take  Changed by: Hansel Green DO     bumetanide 1 MG tablet  Commonly known as: BUMEX  Take 1 tablet by mouth daily  What changed:   · medication strength  · how much to take  Changed by: Hansel Green DO     gabapentin 100 MG capsule  Commonly known as: NEURONTIN  Take 1 capsule by mouth 3 times daily for 30 days. What changed: when to take this        CONTINUE taking these medications    acetaminophen 500 MG tablet  Commonly known as: TYLENOL  Take 2 tablets by mouth 3 times daily     Alcohol Swabs Pads  1 box by Does not apply route 4 times daily     atorvastatin 40 MG tablet  Commonly known as: LIPITOR  TAKE 1 TABLET BY MOUTH DAILY     * blood glucose test strips  Test blood sugar 4 times daily     * blood glucose test strips  Test 4 times a day & as needed for symptoms of irregular blood glucose. Dispense sufficient amount for indicated testing frequency plus additional to accommodate PRN testing needs.      Everolimus 0.75 MG Tabs     famotidine 20 MG tablet  Commonly known as: PEPCID  Take 1 tablet by mouth 2 times daily     insulin lispro (1 Unit Dial) 100 UNIT/ML Sopn  INJECT 15 UNITS INTO THE SKIN THREE TIMES A DAY BEFORE MEALS     * Lancets Misc  1 each by Does not apply route 4 times daily     * Lancets Ultra Thin Misc  1 Device by Does not apply route 4 times daily     magnesium oxide 400 (241.3 Mg) MG Tabs tablet  Commonly known as: MAG-OX  TAKE 1 TABLET BY MOUTH TWICE A DAY     metoprolol succinate 50 MG extended release tablet  Commonly known as: TOPROL XL  Take 1 tablet by mouth daily     NIFEdipine 90 MG extended release tablet  Commonly known as: PROCARDIA XL  Take 1 tablet by mouth daily     Pen Needles 32G X 4 MM Misc  4 pens by Does not apply route 4 times daily     sodium bicarbonate 650 MG tablet     tacrolimus 1 MG capsule  Commonly known as: PROGRAF         * This list has 4 medication(s) that are the same as other medications prescribed for you. Read the directions carefully, and ask your doctor or other care provider to review them with you. Where to Get Your Medications      [facility] [address], [address]  [facility], [address] [place] [id number]    Phone: [phone]   · blood glucose test strips  · Lancets Ultra Thin Misc     Information about where to get these medications is not yet available    Ask your nurse or doctor about these medications  · Basaglar KwikPen 100 UNIT/ML injection pen  · bumetanide 1 MG tablet          Medications marked \"taking\" at this time  Outpatient Medications Marked as Taking for the 4/1/22 encounter (Office Visit) with Brittney Thakur, DO   Medication Sig Dispense Refill    bumetanide (BUMEX) 1 MG tablet Take 1 tablet by mouth daily 90 tablet 3    insulin glargine (BASAGLAR KWIKPEN) 100 UNIT/ML injection pen Inject 12 Units into the skin nightly 5 pen 0    blood glucose monitor strips Test 4 times a day & as needed for symptoms of irregular blood glucose.  Dispense sufficient amount for indicated testing frequency plus additional to accommodate PRN testing needs. 350 strip 5    Lancets Ultra Thin MISC 1 Device by Does not apply route 4 times daily 500 each 1    atorvastatin (LIPITOR) 40 MG tablet TAKE 1 TABLET BY MOUTH DAILY 30 tablet 3    insulin lispro, 1 Unit Dial, 100 UNIT/ML SOPN INJECT 15 UNITS INTO THE SKIN THREE TIMES A DAY BEFORE MEALS 15 mL 3    acetaminophen (TYLENOL) 500 MG tablet Take 2 tablets by mouth 3 times daily 540 tablet 1    Everolimus 0.75 MG TABS 2 times daily 2 tabs 2 times daily          Medications patient taking as of now reconciled against medications ordered at time of hospital discharge: Yes        Objective:    /70   Pulse 72   Wt 164 lb (74.4 kg)   SpO2 98%   BMI 21.06 kg/m²   ROS:    Constitutional: No fevers, chills, fatigue. ENT: No nasal congestion or sore throat  Respiratory: No difficulty in breathing or cough. Cardiovascular: No chest pain, palpitations or shortness of breath  Gastrointestinal: No abdominal pain or change in bowel movements. Genitourinary: No change in urinary frequency or dysuria. Skin: No rashes or skin lesions. Neurological: No weakness. No headaches. PE:    GENERAL APPEARANCE: in no acute distress, well developed, well nourished. HEAD: normocephalic, atraumatic. EYES: extraocular movement intact (EOMI), pupils equal, round, reactive to light and accommodation. EARS: normal, tympanic membrane intact, clear, auditory canal clear. NOSE: nares patent, no erythema, sinuses nontender bilaterally, no rhinorrhea. ORAL CAVITY: mucosa moist, no lesions. THROAT: clear, no mass, no exudate. NECK/THYROID: neck supple, full range of motion, no thyromegaly. HEART: no murmurs, regular rate and rhythm, S1, S2 normal.   LUNGS: clear to auscultation bilaterally, no wheezes, rales, rhonchi.    ABDOMEN: normal, bowel sounds present, soft, nontender, nondistended, no rebound guarding or rigidity      An electronic signature was used to authenticate this note.   --Marine Sahu, DO

## 2022-04-01 NOTE — TELEPHONE ENCOUNTER
"                                             Progress Note    Client Name: Criss Don  Date: 3/24/2017         Service Type: Individual      Session Start Time: 8:00  Session End Time: 8:45      Session Length: 45     Session #: 4 (with this provider)     Attendees: Client attended alone    Treatment Plan Last Reviewed: 3/24/2017  PHQ-9 / NED-7 :      DATA      Progress Since Last Session (Related to Symptoms / Goals / Homework):   Symptoms: Stable    Homework: Partially completed      Episode of Care Goals: Minimal progress - ACTION (Actively working towards change); Intervened by reinforcing change plan / affirming steps taken     Current / Ongoing Stressors and Concerns:   Reported that the family meal was a success and she felt pleased with the outcome. Reported that the emphasis was placed on the activity, not the food, and that her family was supportive. Client attempted to cut back on weighing herself by 25%, and reported some \"obessive\" thinking but was able to distract and divert her attention to more productive tasks to keep her mind off fixating on her weight. Reported that she has been working on identifying and replacing imbalanced thoughts about her weight and eating to reduce guilt and sadness. Reported that she felt uncomfortable eating in front of her family, but was able to \"push through it.\"  Reported that she continues to feel discouraged by not seeing the difference in her size even though she knows she is losing weight.        Treatment Objective(s) Addressed in This Session:   stress management  Maintain compliance with post-surgical dietary needs     Intervention:   CBT: surfaced and challenged \"should\" statements and unreasonable expectations about weight loss; identified sources of unhealthy and unhelpful comparisons  Solution Focused: coached client on ways to distract away from worry thoughts about weight, and ways to manage interpersonal stress more effectively  Supportive therapy: " [facility] to see if patient filled any insulin there recently. They did not have any record of insulin for the last 2 years. Patient stated that he could get it from [facility]. [facility] and spoke with Yary Webb. He stated he only gets the everolimus there. He stated that he could get things from [place] [id number]. Spoke with Robina Partida who stated they have no record of him there. Per  patient should be called and asked to bring in his box of insulins. This should have the prescription information on the box. Called patient and he stated its hard for him to go places because of transportation. He was still at the office at the time. Informed him he could call the office when he got home with the prescription information from both boxes of insulin.  provided active listening, support, and encouragement. Reinforced follow through on homework.        ASSESSMENT: Current Emotional / Mental Status (status of significant symptoms):   Risk status (Self / Other harm or suicidal ideation)   Client denies current fears or concerns for personal safety.   Client denies current or recent suicidal ideation or behaviors.   Client denies current or recent homicidal ideation or behaviors.   Client denies current or recent self injurious behavior or ideation.   Client denies other safety concerns.   A safety and risk management plan has not been developed at this time, however client was given the after-hours number / 911 should there be a change in any of these risk factors.     Appearance:   Appropriate    Eye Contact:   Good    Psychomotor Behavior: Normal    Attitude:   Cooperative  Pleasant    Orientation:   All   Speech    Rate / Production: Normal     Volume:  Normal    Mood:    Anxious    Affect:    Appropriate    Thought Content:  Clear    Thought Form:  Coherent  Logical    Insight:    Fair      Medication Review:   No current psychiatric medications prescribed     Medication Compliance:   Yes     Changes in Health Issues:   None reported     Chemical Use Review:   Substance Use: Chemical use reviewed, no active concerns identified      Tobacco Use: No current tobacco use.       Collateral Reports Completed:   Not Applicable    PLAN: (Client Tasks / Therapist Tasks / Other)  Future appointments are scheduled. Homework: Reduce the frequency of weighing self by 50%, and use calming and distracting activities to manage stress of not weighing self. Keep a thought log at meals to identify unhealthy and unrealistic thinking patterns related to food and weight loss. Create a list of additional ways to calm, distract, and entertain yourself.       Brenda Perez PsyD LP                                                       Treatment Plan    Client's Name: Criss SANTOS  Arian  YOB: 1967    Date: 3/24/2017    DSM-V Diagnoses: 300.00 Unspecified Anxiety Disorder  Psychosocial / Contextual Factors: strain with roommate, spouse, and nephews  WHODAS: 22    Referral / Collaboration:  Referral to another professional/service is not indicated at this time..    Anticipated number of session or this episode of care: reassess after 12 sessions      MeasurableTreatment Goal(s) related to diagnosis / functional impairment(s)  Goal 1: Client will learn coping skills to manage stress and adjust to post-surgical lifestyle changes more effectively.    I will know I've met my goal when I'm not weighing myself all the time and feeling more comfortable with my weight loss.      Objective #A (Client Action)    Client will identify thinking patterns that contribute to anxiety about weight and eating habits.  Status: Continued - Date(s): 3/24/2017    Intervention(s)  Therapist will assign homework (thought  logs), assist client in surfacing and replacement ineffective thinking patterns.    Objective #B  Client will identify new ways to cope with stress and worry thoughts.  Status: Continued - Date(s): 3/24/2017    Intervention(s)  Therapist will assign homework, problem-solve barriers to follow through.    Objective #C  Client will identify and challenge unreasonable or unrealistic expectations about weight loss.  Status: Continued - Date(s): 3/24/2017     Intervention(s)  Therapist will assist client in identifying and balancing her expectations.        Client has reviewed and agreed to the above plan.      Brenda Perez PsyD LP  3/24/2017

## 2022-04-08 ENCOUNTER — OFFICE VISIT (OUTPATIENT)
Dept: FAMILY MEDICINE CLINIC | Age: 72
End: 2022-04-08
Payer: COMMERCIAL

## 2022-04-08 VITALS
WEIGHT: 165.38 LBS | HEART RATE: 80 BPM | DIASTOLIC BLOOD PRESSURE: 76 MMHG | SYSTOLIC BLOOD PRESSURE: 130 MMHG | BODY MASS INDEX: 21.23 KG/M2 | OXYGEN SATURATION: 98 %

## 2022-04-08 DIAGNOSIS — Z79.4 TYPE 2 DIABETES MELLITUS WITH HYPERGLYCEMIA, WITH LONG-TERM CURRENT USE OF INSULIN (HCC): Primary | ICD-10-CM

## 2022-04-08 DIAGNOSIS — E78.5 DYSLIPIDEMIA: ICD-10-CM

## 2022-04-08 DIAGNOSIS — E11.65 TYPE 2 DIABETES MELLITUS WITH HYPERGLYCEMIA, WITH LONG-TERM CURRENT USE OF INSULIN (HCC): Primary | ICD-10-CM

## 2022-04-08 DIAGNOSIS — I10 HTN, GOAL BELOW 130/80: ICD-10-CM

## 2022-04-08 DIAGNOSIS — N18.6 END-STAGE RENAL DISEASE (HCC): ICD-10-CM

## 2022-04-08 PROCEDURE — G8420 CALC BMI NORM PARAMETERS: HCPCS | Performed by: FAMILY MEDICINE

## 2022-04-08 PROCEDURE — 2022F DILAT RTA XM EVC RTNOPTHY: CPT | Performed by: FAMILY MEDICINE

## 2022-04-08 PROCEDURE — 3052F HG A1C>EQUAL 8.0%<EQUAL 9.0%: CPT | Performed by: FAMILY MEDICINE

## 2022-04-08 PROCEDURE — 1036F TOBACCO NON-USER: CPT | Performed by: FAMILY MEDICINE

## 2022-04-08 PROCEDURE — 99213 OFFICE O/P EST LOW 20 MIN: CPT | Performed by: FAMILY MEDICINE

## 2022-04-08 PROCEDURE — 1123F ACP DISCUSS/DSCN MKR DOCD: CPT | Performed by: FAMILY MEDICINE

## 2022-04-08 PROCEDURE — 3017F COLORECTAL CA SCREEN DOC REV: CPT | Performed by: FAMILY MEDICINE

## 2022-04-08 PROCEDURE — 4040F PNEUMOC VAC/ADMIN/RCVD: CPT | Performed by: FAMILY MEDICINE

## 2022-04-08 PROCEDURE — G8427 DOCREV CUR MEDS BY ELIG CLIN: HCPCS | Performed by: FAMILY MEDICINE

## 2022-04-08 RX ORDER — PREDNISONE 1 MG/1
TABLET ORAL DAILY
COMMUNITY
Start: 2022-03-08

## 2022-04-08 RX ORDER — SODIUM CHLORIDE 1000 MG
TABLET, SOLUBLE MISCELLANEOUS 2 TIMES DAILY
COMMUNITY
End: 2022-05-03 | Stop reason: SDUPTHER

## 2022-04-08 RX ORDER — EVEROLIMUS TABLETS 0.25 MG/1
4 TABLET ORAL 2 TIMES DAILY
COMMUNITY

## 2022-04-08 RX ORDER — HYDRALAZINE HYDROCHLORIDE 10 MG/1
TABLET, FILM COATED ORAL DAILY
COMMUNITY
Start: 2022-03-08 | End: 2022-05-03 | Stop reason: SDUPTHER

## 2022-04-08 RX ORDER — METOPROLOL SUCCINATE 100 MG/1
100 TABLET, EXTENDED RELEASE ORAL DAILY
COMMUNITY

## 2022-04-08 RX ORDER — INSULIN ASPART 100 [IU]/ML
INJECTION, SOLUTION INTRAVENOUS; SUBCUTANEOUS
COMMUNITY
Start: 2022-03-10 | End: 2022-05-16 | Stop reason: SDUPTHER

## 2022-04-08 NOTE — PROGRESS NOTES
Progress Note    Fe Bermudez is a 71 y.o.  male who presents today alone for evaluation of   Chief Complaint   Patient presents with    Hyperlipidemia    Hypertension    Diabetes           HPI:   Patient is here for follow up on HTN, DM type 2, and dyslipidemia. Patient denies cp/sob/le edema/dizziness/lightheadedness/blurry va/ha. Patient states he is seeing his nephrologist regularly. Patient last HbA1c 2/2022 8.5. Patient states he is using his basaglar 12 units nightly. Patient is using novolog SSI after his meals. Patient states he is attempting to monitor his carb intake. Patient denies exertional calf cramping. Patient denies polyuria/polyphagia/polydipsia. Patient is due for JOSE DAVID. He states his FBG was 130-150.     Health Maintenance Due   Topic Date Due    Hepatitis A vaccine (1 of 2 - Risk 2-dose series) Never done    Shingles Vaccine (1 of 2) Never done    Diabetic retinal exam  08/16/2017    Diabetic foot exam  11/22/2020    Low dose CT lung screening  03/24/2021    COVID-19 Vaccine (3 - Moderna risk 4-dose series) 04/07/2021    Annual Wellness Visit (AWV)  06/30/2021        Current Medications:     Current Outpatient Medications   Medication Sig Dispense Refill    Everolimus 0.25 MG TABS Take 4 tablets by mouth 2 times daily      hydrALAZINE (APRESOLINE) 10 MG tablet Take by mouth daily      metoprolol succinate (TOPROL XL) 100 MG extended release tablet Take 100 mg by mouth daily      NOVOLOG FLEXPEN 100 UNIT/ML injection pen Inject into the skin Per sliding scale      predniSONE (DELTASONE) 5 MG tablet Take by mouth daily      bumetanide (BUMEX) 1 MG tablet Take 1 tablet by mouth daily 90 tablet 3    atorvastatin (LIPITOR) 40 MG tablet TAKE 1 TABLET BY MOUTH DAILY 30 tablet 3    acetaminophen (TYLENOL) 500 MG tablet Take 2 tablets by mouth 3 times daily (Patient taking differently: Take 1,000 mg by mouth 3 times daily as needed ) 540 tablet 1    famotidine (PEPCID) 20 MG tablet Take 1 tablet by mouth 2 times daily (Patient taking differently: Take 20 mg by mouth daily ) 60 tablet 5    gabapentin (NEURONTIN) 100 MG capsule Take 1 capsule by mouth 3 times daily for 30 days. (Patient taking differently: Take 100 mg by mouth 2 times daily. ) 180 capsule 0    Everolimus 0.75 MG TABS 2 times daily 2 tabs 2 times daily      magnesium oxide (MAG-OX) 400 (241.3 Mg) MG TABS tablet TAKE 1 TABLET BY MOUTH TWICE A DAY 60 tablet 2    tacrolimus (PROGRAF) 1 MG capsule Take 6 mg by mouth 2 times daily       sodium chloride 1 g tablet Take by mouth 2 times daily      insulin glargine (BASAGLAR KWIKPEN) 100 UNIT/ML injection pen Inject 12 Units into the skin nightly 5 pen 0    blood glucose monitor strips Test 4 times a day & as needed for symptoms of irregular blood glucose. Dispense sufficient amount for indicated testing frequency plus additional to accommodate PRN testing needs. 350 strip 5    Lancets Ultra Thin MISC 1 Device by Does not apply route 4 times daily 500 each 1    blood glucose monitor strips Test blood sugar 4 times daily 500 strip 1    Insulin Pen Needle (PEN NEEDLES) 32G X 4 MM MISC 4 pens by Does not apply route 4 times daily 350 each 5    sodium bicarbonate 650 MG tablet 650 mg 2 times daily       Alcohol Swabs PADS 1 box by Does not apply route 4 times daily 100 each 5    metoprolol succinate (TOPROL XL) 50 MG extended release tablet Take 1 tablet by mouth daily 90 tablet 1    NIFEdipine (PROCARDIA XL) 90 MG extended release tablet Take 1 tablet by mouth daily 90 tablet 1    Lancets MISC 1 each by Does not apply route 4 times daily 600 each 5     No current facility-administered medications for this visit. Allergies:      Allergies   Allergen Reactions    Amino Acids Nausea Only    Lisinopril Nausea Only        Medical History:     Past Medical History:   Diagnosis Date    Anemia     CAD (coronary artery disease)     ([address])    CKD (chronic kidney disease) stage 4, GFR 15-29 ml/min (HCC)     COVID-19 03/2020    Elevated PSA, between 10 and less than 20 ng/ml     End stage kidney disease ([facility]     Hemodialysis access, fistula mature ([facility]     right arm    Hemodialysis patient [facility])     patient had Kidney transplant-no longer on dialysis    Hepatitis C without hepatic coma     Hx of inguinal hernia repair     Hyperlipidemia     Hypertension     Irregular heart rate     wearing holter monitor 1/29/21    Leukopenia 01/29/2021    Palpitations     Prostate cancer ([facility]     prostate    Renal [facility]20/2017    [facility]    Thrombocytopenia ([facility] 01/29/2021    [facility] 2 diabetes mellitus [facility]     Under care of team 05/04/2021    [facility]phrology-[facility]-last visit april 2021    Under care of team 05/04/2021    cardiology-[facility] -last visit onc 2020    Wellness examination 05/04/2021    pcp[facility]-last visit march 2021       Past Surgical History:   Procedure Laterality Date    CARDIAC CATHETERIZATION      Patient states normal    COLONOSCOPY N/A 10/30/2019    COLONOSCOPY WITH BIOPSY performed by Lanie Whaley MD at [facility]  8/6/2021    CT BIOPSY RENAL    CYSTOSCOPY Right 05/19/2021     CYSTOSCOPY URETERAL STENT INSERTION (Right )    CYSTOSCOPY Right 5/19/2021    CYSTOSCOPY performed by Wolf Beckett MD at [facility] Right 11/2015    at [facility]    left inguinal    KIDNEY TRANSPLANT  2017    right    LIVER BIOPSY      PROSTATE BIOPSY N/A 2/12/2021    PROSTATE BIOPSY WITH ULTRASOUND TO OR WITH TECH performed by Darion Castillo MD at [facility]  05/19/2021    XI ROBOTIC LAPAROSCOPIC PROSTATECTOMY WITH STANDARD PELVIC LYMPHNODE DISSECTION AND LEFT PELVIC LYMPHNODE DISSECTION     PROSTATECTOMY N/A 5/19/2021    XI ROBOTIC LAPAROSCOPIC PROSTATECTOMY DISSECTION performed by Claudeen Cannon Dodie Romo MD at [address]  2021    US PROSTATE NEEDLE PUNCH 2021 [facility] ULTRASOUND       Family History   Problem Relation Age of Onset   [facility] Cancer Mother         Lymphoma    Lung Cancer Father     Colon Cancer Brother         Social History:     Social History     Socioeconomic History    Marital status:      Spouse name: Not on file    Number of children: Not on file    Years of education: Not on file    Highest education level: Not on file   Occupational History    Not on file   Tobacco Use    Smoking status: Former Smoker     Packs/day: 0.50     Years: 50.00     Pack years: 25.00     Types: Cigarettes     Quit date: 2013     Years since quittin.0    Smokeless tobacco: Never Used    Tobacco comment: No smoking in at least 3 years   Vaping Use    Vaping Use: Never used   Substance and Sexual Activity    Alcohol use: No     Comment: No ETOH in 3 years    Drug use: Not Currently     Types: Other-see comments     Comment: Hx of marijuana and crack cocaine previously - last use was about 3 years ago as of 22    Sexual activity: Never   Other Topics Concern    Not on file   Social History Narrative    Not on file     Social Determinants of Health     Financial Resource Strain: Low Risk     Difficulty of Paying Living Expenses: Not hard at all   Food Insecurity: No Food Insecurity    Worried About [address] in the Last Year: Never true    [address] in the Last Year: Never true   Transportation Needs:     Lack of Transportation (Medical): Not on file    Lack of Transportation (Non-Medical):  Not on file   Physical Activity:     Days of Exercise per Week: Not on file    Minutes of Exercise per Session: Not on file   Stress:     Feeling of Stress : Not on file   Social Connections:     Frequency of Communication with Friends and Family: Not on file    Frequency of Social Gatherings with Friends and Family: Not on file    Attends Judaism Services: Not on file    Active Member of Clubs or Organizations: Not on file    Attends Club or Organization Meetings: Not on file    Marital Status: Not on file   Intimate Partner Violence:     Fear of Current or Ex-Partner: Not on file    Emotionally Abused: Not on file    Physically Abused: Not on file    Sexually Abused: Not on file   Housing Stability:     Unable to Pay for Housing in the Last Year: Not on file    Number of [place] in the Last Year: Not on file    Unstable Housing in the Last Year: Not on file        ROS:     Constitutional: No fevers, chills, fatigue. ENT: No nasal congestion or sore throat  Respiratory: No difficulty in breathing or cough. Cardiovascular: No chest pain, no palpitations, no shortness of breath  Gastrointestinal: No abdominal pain or change in bowel movements. Genitourinary: No change in urinary frequency or dysuria. Skin: no skin lesions or rashes  Neurological: No weakness. No headaches. Last Filed Vitals:  /76   Pulse 80   Wt 165 lb 6 oz (75 kg)   SpO2 98%   BMI 21.23 kg/m²      Physical Examination:     GENERAL APPEARANCE: in no acute distress, well developed, well nourished. HEAD: normocephalic, atraumatic. EYES: extraocular movement intact (EOMI), pupils equal, round, reactive to light and accommodation. EARS: normal, tympanic membrane intact, clear, auditory canal clear. NOSE: nares patent, no erythema, sinuses nontender bilaterally, no rhinorrhea. ORAL CAVITY: mucosa moist, no lesions. THROAT: clear, no mass, no exudate. NECK/THYROID: neck supple, full range of motion, no thyromegaly. HEART: no murmurs, regular rate and rhythm, S1, S2 normal.   LUNGS: clear to auscultation bilaterally, no wheezes, rales, rhonchi.    ABDOMEN: normal, bowel sounds present, soft, nontender, nondistended, no rebound guarding or rigidity    Recent Labs/ In Office Testing/ Radiograph review:     No results found for this visit on 04/08/22. Assessment/Plan:     Afshan Rutledge was seen today for hyperlipidemia, hypertension and diabetes. Diagnoses and all orders for this visit:    Type 2 diabetes mellitus with hyperglycemia, with long-term current use of insulin ([facility])    HTN, goal below 130/80    End-stage renal disease ([facility]    Dyslipidemia    Follow up on labs. Encouraged well balanced low carb diet. Encouraged 150 mins of aerobic activity weekly. Encouraged regular follow up with his specialists. Continue current medical regimen. Updated med list. Patient states he is not taking lispro insulin. All questions answered and addressed to patient satisfaction. Patient understands and agrees to the plan. The patient was evaluated and treated today based on the osteopathic principle that each person is a unit of body, mind, and spirit, the body is capable of self-regulation, self-healing, and health maintenance and that structure and function are reciprocally interrelated. Follow-up:   Return for keep appt; Brittany Valles D.O.

## 2022-04-11 DIAGNOSIS — E11.65 TYPE 2 DIABETES MELLITUS WITH HYPERGLYCEMIA, WITH LONG-TERM CURRENT USE OF INSULIN (HCC): ICD-10-CM

## 2022-04-11 DIAGNOSIS — Z79.4 TYPE 2 DIABETES MELLITUS WITH HYPERGLYCEMIA, WITH LONG-TERM CURRENT USE OF INSULIN (HCC): ICD-10-CM

## 2022-04-11 RX ORDER — INSULIN GLARGINE 100 [IU]/ML
12 INJECTION, SOLUTION SUBCUTANEOUS NIGHTLY
Qty: 5 PEN | Refills: 0 | Status: SHIPPED | OUTPATIENT
Start: 2022-04-11

## 2022-04-11 NOTE — TELEPHONE ENCOUNTER
Last visit:   Last Med refill:    Next Visit Date:  Future Appointments   Date Time Provider Soham Wendy   6/24/2022  1:40 PM DO GENIE Allison  Maintenance   Topic Date Due    Hepatitis A vaccine (1 of 2 - Risk 2-dose series) Never done    Shingles Vaccine (1 of 2) Never done    Diabetic retinal exam  08/16/2017    Diabetic foot exam  11/22/2020    Low dose CT lung screening  03/24/2021    COVID-19 Vaccine (3 - Moderna risk 4-dose series) 04/07/2021    Annual Wellness Visit (AWV)  06/30/2021    A1C test (Diabetic or Prediabetic)  02/09/2023    Lipid screen  02/09/2023    Potassium monitoring  02/09/2023    Creatinine monitoring  02/09/2023    PSA counseling  02/09/2023    Depression Screen  04/01/2023    Colorectal Cancer Screen  10/30/2024    DTaP/Tdap/Td vaccine (2 - Td or Tdap) 07/22/2029    Flu vaccine  Completed    Pneumococcal 65+ yrs at Risk Vaccine  Completed    AAA screen  Completed    Hib vaccine  Aged Out    Meningococcal (ACWY) vaccine  Aged Out       Hemoglobin A1C (%)   Date Value   02/09/2022 8.5 (H)   07/06/2021 8.7 (H)   08/14/2020 7.9 (H)             ( goal A1C is < 7)   Microalb/Crt.  Ratio (mcg/mg creat)   Date Value   02/09/2022 2,931 (H)     LDL Cholesterol (mg/dL)   Date Value   02/09/2022 150 (H)   07/06/2021 114     LDL Calculated (mg/dL)   Date Value   11/04/2015 107       (goal LDL is <100)   AST (U/L)   Date Value   02/09/2022 21     ALT (U/L)   Date Value   02/09/2022 26     BUN (mg/dL)   Date Value   02/09/2022 83 (H)     BP Readings from Last 3 Encounters:   04/08/22 130/76   04/01/22 110/70   12/07/21 134/72          (goal 120/80)    All Future Testing planned in [facility]  Lab Frequency Next Occurrence   COVID-19 Once 05/13/2021   Microalbumin, Ur Once 06/22/2021   ALT Once 11/05/2021   AST Once 11/05/2021   CBC Auto Differential Once 11/05/2021   Hemoglobin A1C Once 11/05/2021   Lipid Panel Once 11/05/2021   Magnesium Once 11/05/2021   Microalbumin, Ur Once 11/05/2021   Renal Function Panel Once 11/05/2021   TSH with Reflex Once 11/05/2021   CBC with Auto Differential Once 02/21/2022   CBC with Auto Differential Once 04/01/2022   Renal Function Panel Once 04/01/2022   Magnesium Once 04/01/2022   Hemoglobin A1C Once 04/01/2022   Microalbumin, Ur Once 04/01/2022   Lipid Panel Once 04/01/2022   AST Once 04/01/2022   ALT Once 04/01/2022   Hemoglobin and Hematocrit, Blood, Post Transfusion POST TRANSFUSION                Patient Active Problem List:     Controlled type 2 diabetes mellitus with stage 2 chronic kidney disease, with long-term current use of insulin (HCC)     Elevated serum cholesterol     Renal insufficiency syndrome     Chronic hepatitis C (HCC)     Coronary arteriosclerosis     End-stage renal disease ([address]     Benign essential HTN     General patient noncompliance     Encounter for colonoscopy due to history of adenomatous colonic polyps     Family hx of colon cancer requiring screening colonoscopy     History of renal transplant     Hyperlipidemia     Tobacco user     Chronic diarrhea     Immunosuppressed status (HCC)     Elevated C-reactive protein (CRP)     Elevated LDH     Normochromic normocytic anemia     Abdominal pain     Acute kidney injury ([address]     Dizziness     Fever     Left ventricular hypertrophy     Lung mass     Pneumonia due to infectious organism     Elevated PSA, between 10 and less than 20 ng/ml     Prostate cancer ([address]

## 2022-04-14 ENCOUNTER — TELEPHONE (OUTPATIENT)
Dept: FAMILY MEDICINE CLINIC | Age: 72
End: 2022-04-14

## 2022-05-03 RX ORDER — HYDRALAZINE HYDROCHLORIDE 10 MG/1
10 TABLET, FILM COATED ORAL DAILY
Qty: 90 TABLET | Refills: 5 | Status: SHIPPED | OUTPATIENT
Start: 2022-05-03 | End: 2022-05-05

## 2022-05-03 RX ORDER — SODIUM CHLORIDE 1000 MG
1 TABLET, SOLUBLE MISCELLANEOUS 2 TIMES DAILY
Qty: 90 TABLET | Refills: 5 | Status: SHIPPED | OUTPATIENT
Start: 2022-05-03

## 2022-05-03 NOTE — TELEPHONE ENCOUNTER
Sharmila Hunter is calling to request a refill on the following medication(s):    Medication Request:  Requested Prescriptions     Pending Prescriptions Disp Refills    sodium chloride 1 g tablet 90 tablet      Sig: Take 1 tablet by mouth 2 times daily    hydrALAZINE (APRESOLINE) 10 MG tablet 90 tablet      Sig: Take 1 tablet by mouth daily       Last Visit Date (If Applicable):  0/5/5401    Next Visit Date:    5/16/2022

## 2022-05-05 RX ORDER — HYDRALAZINE HYDROCHLORIDE 10 MG/1
TABLET, FILM COATED ORAL
Qty: 90 TABLET | Refills: 0 | Status: SHIPPED | OUTPATIENT
Start: 2022-05-05

## 2022-05-05 NOTE — TELEPHONE ENCOUNTER
Renée Bolanos is calling to request a refill on the following medication(s):    Medication Request:  Requested Prescriptions     Pending Prescriptions Disp Refills    hydrALAZINE (APRESOLINE) 10 MG tablet [Pharmacy Med Name: hydralazine 10 mg tablet] 90 tablet 0     Sig: TAKE ONE TABLET BY MOUTH EVERY 8 HOURS       Last Visit Date (If Applicable):  0/9/4005    Next Visit Date:    5/16/2022

## 2022-05-16 ENCOUNTER — OFFICE VISIT (OUTPATIENT)
Dept: FAMILY MEDICINE CLINIC | Age: 72
End: 2022-05-16
Payer: COMMERCIAL

## 2022-05-16 VITALS
BODY MASS INDEX: 21.57 KG/M2 | TEMPERATURE: 97.2 F | DIASTOLIC BLOOD PRESSURE: 72 MMHG | WEIGHT: 168 LBS | HEART RATE: 80 BPM | SYSTOLIC BLOOD PRESSURE: 136 MMHG | OXYGEN SATURATION: 100 %

## 2022-05-16 DIAGNOSIS — Z76.0 MEDICATION REFILL: ICD-10-CM

## 2022-05-16 DIAGNOSIS — N18.6 END-STAGE RENAL DISEASE (HCC): ICD-10-CM

## 2022-05-16 DIAGNOSIS — E78.5 DYSLIPIDEMIA: ICD-10-CM

## 2022-05-16 DIAGNOSIS — Z79.4 TYPE 2 DIABETES MELLITUS WITH HYPERGLYCEMIA, WITH LONG-TERM CURRENT USE OF INSULIN (HCC): ICD-10-CM

## 2022-05-16 DIAGNOSIS — K21.9 GASTROESOPHAGEAL REFLUX DISEASE WITHOUT ESOPHAGITIS: ICD-10-CM

## 2022-05-16 DIAGNOSIS — E11.65 TYPE 2 DIABETES MELLITUS WITH HYPERGLYCEMIA, WITH LONG-TERM CURRENT USE OF INSULIN (HCC): Primary | ICD-10-CM

## 2022-05-16 DIAGNOSIS — I10 HTN, GOAL BELOW 130/80: ICD-10-CM

## 2022-05-16 DIAGNOSIS — Z79.4 TYPE 2 DIABETES MELLITUS WITH HYPERGLYCEMIA, WITH LONG-TERM CURRENT USE OF INSULIN (HCC): Primary | ICD-10-CM

## 2022-05-16 DIAGNOSIS — E11.65 TYPE 2 DIABETES MELLITUS WITH HYPERGLYCEMIA, WITH LONG-TERM CURRENT USE OF INSULIN (HCC): ICD-10-CM

## 2022-05-16 PROCEDURE — 3052F HG A1C>EQUAL 8.0%<EQUAL 9.0%: CPT | Performed by: FAMILY MEDICINE

## 2022-05-16 PROCEDURE — 2022F DILAT RTA XM EVC RTNOPTHY: CPT | Performed by: FAMILY MEDICINE

## 2022-05-16 PROCEDURE — 3017F COLORECTAL CA SCREEN DOC REV: CPT | Performed by: FAMILY MEDICINE

## 2022-05-16 PROCEDURE — G8427 DOCREV CUR MEDS BY ELIG CLIN: HCPCS | Performed by: FAMILY MEDICINE

## 2022-05-16 PROCEDURE — 99214 OFFICE O/P EST MOD 30 MIN: CPT | Performed by: FAMILY MEDICINE

## 2022-05-16 PROCEDURE — 1036F TOBACCO NON-USER: CPT | Performed by: FAMILY MEDICINE

## 2022-05-16 PROCEDURE — G8420 CALC BMI NORM PARAMETERS: HCPCS | Performed by: FAMILY MEDICINE

## 2022-05-16 PROCEDURE — 1123F ACP DISCUSS/DSCN MKR DOCD: CPT | Performed by: FAMILY MEDICINE

## 2022-05-16 PROCEDURE — 4040F PNEUMOC VAC/ADMIN/RCVD: CPT | Performed by: FAMILY MEDICINE

## 2022-05-16 RX ORDER — INSULIN ASPART 100 [IU]/ML
INJECTION, SOLUTION INTRAVENOUS; SUBCUTANEOUS
Qty: 5 PEN | Refills: 5 | Status: SHIPPED | OUTPATIENT
Start: 2022-05-16 | End: 2022-05-16 | Stop reason: SDUPTHER

## 2022-05-16 RX ORDER — FAMOTIDINE 20 MG/1
20 TABLET, FILM COATED ORAL DAILY
Qty: 90 TABLET | Refills: 3 | Status: SHIPPED | OUTPATIENT
Start: 2022-05-16

## 2022-05-16 RX ORDER — BUMETANIDE 1 MG/1
1 TABLET ORAL DAILY
Qty: 90 TABLET | Refills: 3 | Status: SHIPPED | OUTPATIENT
Start: 2022-05-16

## 2022-05-16 RX ORDER — PEN NEEDLE, DIABETIC 30 GX3/16"
4 NEEDLE, DISPOSABLE MISCELLANEOUS 4 TIMES DAILY
Qty: 350 EACH | Refills: 5 | Status: SHIPPED | OUTPATIENT
Start: 2022-05-16

## 2022-05-16 RX ORDER — GABAPENTIN 100 MG/1
100 CAPSULE ORAL 2 TIMES DAILY
Qty: 180 CAPSULE | Refills: 1 | Status: SHIPPED | OUTPATIENT
Start: 2022-05-16 | End: 2022-08-14

## 2022-05-16 RX ORDER — INSULIN ASPART 100 [IU]/ML
INJECTION, SOLUTION INTRAVENOUS; SUBCUTANEOUS
Qty: 5 PEN | Refills: 5 | Status: SHIPPED | OUTPATIENT
Start: 2022-05-16 | End: 2022-05-17 | Stop reason: SDUPTHER

## 2022-05-16 ASSESSMENT — PATIENT HEALTH QUESTIONNAIRE - PHQ9
2. FEELING DOWN, DEPRESSED OR HOPELESS: 0
1. LITTLE INTEREST OR PLEASURE IN DOING THINGS: 0
SUM OF ALL RESPONSES TO PHQ QUESTIONS 1-9: 0
SUM OF ALL RESPONSES TO PHQ QUESTIONS 1-9: 0
SUM OF ALL RESPONSES TO PHQ9 QUESTIONS 1 & 2: 0
SUM OF ALL RESPONSES TO PHQ QUESTIONS 1-9: 0
SUM OF ALL RESPONSES TO PHQ QUESTIONS 1-9: 0

## 2022-05-16 NOTE — TELEPHONE ENCOUNTER
Call from [address] they need direction on  Novolog flex pen ,   They are unable to use inject into the skin per sliding scale, Can you please review ?  Thank you

## 2022-05-16 NOTE — TELEPHONE ENCOUNTER
Per the patient - his sliding scale is   Blood sugars -   Over 160  He takes 4 unites    Over 200 he takes  6 unites   Over 260  He takes 8 unites     Over  300 he takes 10 unites

## 2022-05-16 NOTE — PROGRESS NOTES
Progress Note    Sharmila Hunter is a 71 y.o.  male who presents today alone for evaluation of   Chief Complaint   Patient presents with    Diabetes           HPI:   Patient is here for follow up on HTN, DM type 2, and dyslipidemia. Patient denies cp/sob/le edema/dizziness/lightheadedness/blurry va/ha. Patient states he is seeing his nephrologist regularly. Patient last HbA1c 2/2022 8.5. Patient states he is using his basaglar 12 units nightly. Patient is using novolog SSI after his meals. Patient states he is attempting to monitor his carb intake. Patient denies exertional calf cramping. Patient denies polyuria/polyphagia/polydipsia. Patient is due for JOSE DAVID. He states his FBG was 130-150. Patient needs refills today. Patient states during dialysis last week he had some mild extravasation of dialysis fluid into his right axilla. Patient states he initally had a fluid collection however this is improving and does not cause him discomfort. Health Maintenance Due   Topic Date Due    Hepatitis A vaccine (1 of 2 - Risk 2-dose series) Never done    Shingles vaccine (1 of 2) Never done    Low dose CT lung screening  Never done    Diabetic retinal exam  08/16/2017    Diabetic foot exam  11/22/2020    Annual Wellness Visit (AWV)  06/30/2021        Current Medications:     Current Outpatient Medications   Medication Sig Dispense Refill    famotidine (PEPCID) 20 MG tablet Take 1 tablet by mouth daily 90 tablet 3    gabapentin (NEURONTIN) 100 MG capsule Take 1 capsule by mouth 2 times daily for 90 days.  180 capsule 1    bumetanide (BUMEX) 1 MG tablet Take 1 tablet by mouth daily 90 tablet 3    insulin aspart (NOVOLOG FLEXPEN) 100 UNIT/ML injection pen Inject into the skin per sliding scale 5 pen 5    Insulin Pen Needle (PEN NEEDLES) 32G X 4 MM MISC 4 pens by Does not apply route 4 times daily 350 each 5    hydrALAZINE (APRESOLINE) 10 MG tablet TAKE ONE TABLET BY MOUTH EVERY 8 HOURS 90 tablet 0    sodium chloride 1 g tablet Take 1 tablet by mouth 2 times daily 90 tablet 5    insulin glargine (BASAGLAR KWIKPEN) 100 UNIT/ML injection pen Inject 12 Units into the skin nightly 5 pen 0    Everolimus 0.25 MG TABS Take 4 tablets by mouth 2 times daily      metoprolol succinate (TOPROL XL) 100 MG extended release tablet Take 100 mg by mouth daily      predniSONE (DELTASONE) 5 MG tablet Take by mouth daily      blood glucose monitor strips Test 4 times a day & as needed for symptoms of irregular blood glucose. Dispense sufficient amount for indicated testing frequency plus additional to accommodate PRN testing needs. 350 strip 5    Lancets Ultra Thin MISC 1 Device by Does not apply route 4 times daily 500 each 1    atorvastatin (LIPITOR) 40 MG tablet TAKE 1 TABLET BY MOUTH DAILY 30 tablet 3    blood glucose monitor strips Test blood sugar 4 times daily 500 strip 1    acetaminophen (TYLENOL) 500 MG tablet Take 2 tablets by mouth 3 times daily (Patient taking differently: Take 1,000 mg by mouth 3 times daily as needed ) 540 tablet 1    Alcohol Swabs PADS 1 box by Does not apply route 4 times daily 100 each 5    metoprolol succinate (TOPROL XL) 50 MG extended release tablet Take 1 tablet by mouth daily 90 tablet 1    NIFEdipine (PROCARDIA XL) 90 MG extended release tablet Take 1 tablet by mouth daily 90 tablet 1    Lancets MISC 1 each by Does not apply route 4 times daily 600 each 5    Everolimus 0.75 MG TABS 2 times daily 2 tabs 2 times daily      magnesium oxide (MAG-OX) 400 (241.3 Mg) MG TABS tablet TAKE 1 TABLET BY MOUTH TWICE A DAY 60 tablet 2    tacrolimus (PROGRAF) 1 MG capsule Take 6 mg by mouth 2 times daily        No current facility-administered medications for this visit. Allergies:      Allergies   Allergen Reactions    Amino Acids Nausea Only    Lisinopril Nausea Only        Medical History:     Past Medical History:   Diagnosis Date    Anemia     CAD (coronary artery disease) [facility])    CKD (chronic kidney disease) stage 4, GFR 15-29 ml/min ([facility])     COVID-19 03/2020    Elevated PSA, between 10 and less than 20 ng/ml     End stage kidney disease ([facility]     Hemodialysis access, fistula mature ([facility]     right arm    Hemodialysis patient [facility])     patient had Kidney transplant-no longer on dialysis    Hepatitis C without hepatic coma     Hx of inguinal hernia repair     Hyperlipidemia     Hypertension     Irregular heart rate     wearing holter monitor 1/29/21    Leukopenia 01/29/2021    Palpitations     Prostate cancer ([facility]     prostate    Renal transplant recipient [facility]    [facility]    Thrombocytopenia ([facility] 01/29/2021    [facility] diabetes mellitus [facility]     Under care of team 05/04/2021    nephrology-[facility]-last visit april 2021    Under care of team 05/04/2021    cardiology-[facility] -last visit onc 2020    Wellness examination 05/04/2021    pcp-[facility]-last visit march 2021       Past Surgical History:   Procedure Laterality Date    CARDIAC CATHETERIZATION      Patient states normal    COLONOSCOPY N/A 10/30/2019    COLONOSCOPY WITH BIOPSY performed by Bettye Guillory MD at [address] [facility] [address] BIOPSY RENAL  8/6/2021    CT BIOPSY RENAL    CYSTOSCOPY Right 05/19/2021     CYSTOSCOPY URETERAL STENT INSERTION (Right )    CYSTOSCOPY Right 5/19/2021    CYSTOSCOPY performed by Jazz Sumner MD at [address] [facility] Right 11/2015    at [facility]    left inguinal    KIDNEY TRANSPLANT  2017    right    LIVER BIOPSY      PROSTATE BIOPSY N/A 2/12/2021    PROSTATE BIOPSY WITH ULTRASOUND TO OR WITH TECH performed by Mariluz Boyce MD at [address] [facility]  05/19/2021    XI ROBOTIC LAPAROSCOPIC PROSTATECTOMY WITH STANDARD PELVIC LYMPHNODE DISSECTION AND LEFT PELVIC LYMPHNODE DISSECTION     PROSTATECTOMY N/A 5/19/2021    XI ROBOTIC LAPAROSCOPIC PROSTATECTOMY DISSECTION performed by Ky Samayoa MD at [address] [facility] [address]  2021     PROSTATE NEEDLE PUNCH 2021 [facility] ULTRASOUND       Family History   Problem Relation Age of Onset   Pily Stabs Cancer Mother         Lymphoma    Lung Cancer Father     Colon Cancer Brother         Social History:     Social History     Socioeconomic History    Marital status:      Spouse name: Not on file    Number of children: Not on file    Years of education: Not on file    Highest education level: Not on file   Occupational History    Not on file   Tobacco Use    Smoking status: Former Smoker     Packs/day: 0.50     Years: 50.00     Pack years: 25.00     Types: Cigarettes     Quit date: 2013     Years since quittin.1    Smokeless tobacco: Never Used    Tobacco comment: No smoking in at least 3 years   Vaping Use    Vaping Use: Never used   Substance and Sexual Activity    Alcohol use: No     Comment: No ETOH in 3 years    Drug use: Not Currently     Types: Other-see comments     Comment: Hx of marijuana and crack cocaine previously - last use was about 3 years ago as of 22    Sexual activity: Never   Other Topics Concern    Not on file   Social History Narrative    Not on file     Social Determinants of Health     Financial Resource Strain: Low Risk     Difficulty of Paying Living Expenses: Not hard at all   Food Insecurity: No Food Insecurity    Worried About [address] in the Last Year: Never true    [address] in the Last Year: Never true   Transportation Needs:     Lack of Transportation (Medical): Not on file    Lack of Transportation (Non-Medical):  Not on file   Physical Activity:     Days of Exercise per Week: Not on file    Minutes of Exercise per Session: Not on file   Stress:     Feeling of Stress : Not on file   Social Connections:     Frequency of Communication with Friends and Family: Not on file    Frequency of Social Gatherings with Friends and Family: Not on file    Attends Mandaeism Services: Not on file    Active Member of Clubs or Organizations: Not on file    Attends Club or Organization Meetings: Not on file    Marital Status: Not on file   Intimate Partner Violence:     Fear of Current or Ex-Partner: Not on file    Emotionally Abused: Not on file    Physically Abused: Not on file    Sexually Abused: Not on file   Housing Stability:     Unable to Pay for Housing in the Last Year: Not on file    Number of [place] in the Last Year: Not on file    Unstable Housing in the Last Year: Not on file        ROS:     Constitutional: No fevers, chills, fatigue. ENT: No nasal congestion or sore throat  Respiratory: No difficulty in breathing or cough. Cardiovascular: No chest pain, no palpitations, no shortness of breath  Gastrointestinal: No abdominal pain or change in bowel movements. Genitourinary: No change in urinary frequency or dysuria. Skin: no skin lesions or rashes  Neurological: No weakness. No headaches. MSK: +right fluid collection axilla         Last Filed Vitals:  /72   Pulse 80   Temp 97.2 °F (36.2 °C) (Temporal)   Wt 168 lb (76.2 kg)   SpO2 100%   BMI 21.57 kg/m²      Physical Examination:     GENERAL APPEARANCE: in no acute distress, well developed, well nourished. HEAD: normocephalic, atraumatic. EYES: extraocular movement intact (EOMI), pupils equal, round, reactive to light and accommodation. EARS: normal, tympanic membrane intact, clear, auditory canal clear. NOSE: nares patent, no erythema, sinuses nontender bilaterally, no rhinorrhea. ORAL CAVITY: mucosa moist, no lesions. THROAT: clear, no mass, no exudate. NECK/THYROID: neck supple, full range of motion, no thyromegaly. HEART: no murmurs, regular rate and rhythm, S1, S2 normal.   LUNGS: clear to auscultation bilaterally, no wheezes, rales, rhonchi.    ABDOMEN: normal, bowel sounds present, soft, nontender, nondistended, no rebound guarding or rigidity  MSK: +non-tender non-erythematous mass right axilla    Recent Labs/ In Office Testing/ Radiograph review:     No results found for this visit on 05/16/22. Assessment/Plan:     Lissa Brewer was seen today for diabetes. Diagnoses and all orders for this visit:    Type 2 diabetes mellitus with hyperglycemia, with long-term current use of insulin (HCC)  -     insulin aspart (NOVOLOG FLEXPEN) 100 UNIT/ML injection pen; Inject into the skin per sliding scale  -     Hemoglobin A1C; Future    End-stage renal disease (HCC)  -     bumetanide (BUMEX) 1 MG tablet; Take 1 tablet by mouth daily  -     Microalbumin, Ur; Future  -     Renal Function Panel; Future    HTN, goal below 130/80  -     Magnesium; Future    Dyslipidemia  -     ALT; Future  -     AST; Future  -     CBC with Auto Differential; Future  -     Lipid Panel; Future    Medication refill  -     gabapentin (NEURONTIN) 100 MG capsule; Take 1 capsule by mouth 2 times daily for 90 days. -     Insulin Pen Needle (PEN NEEDLES) 32G X 4 MM MISC; 4 pens by Does not apply route 4 times daily    Gastroesophageal reflux disease without esophagitis  -     famotidine (PEPCID) 20 MG tablet; Take 1 tablet by mouth daily    Follow up on labs. Encouraged well balanced low carb diet. Encouraged 150 mins of aerobic activity weekly. Encouraged regular follow up with his specialists. Continue current medical regimen. Updated med list. Refills as above. Extravasation of fluid appears to be improving. Discussed if it did not resolve in the next week or two he should RTC for appt. Patient agrees. All questions answered and addressed to patient satisfaction. Patient understands and agrees to the plan.      The patient was evaluated and treated today based on the osteopathic principle that each person is a unit of body, mind, and spirit, the body is capable of self-regulation, self-healing, and health maintenance and that structure and function are reciprocally interrelated. Follow-up:   Return if symptoms worsen or fail to improve.       Todd Mcdaniel D.O.

## 2022-05-16 NOTE — TELEPHONE ENCOUNTER
Please clarify with pt what his sliding scale is he did not have his med list at his appt and did not remember.  thx.

## 2022-05-17 RX ORDER — INSULIN ASPART 100 [IU]/ML
INJECTION, SOLUTION INTRAVENOUS; SUBCUTANEOUS
Qty: 5 PEN | Refills: 5 | Status: SHIPPED | OUTPATIENT
Start: 2022-05-17

## 2022-05-17 NOTE — TELEPHONE ENCOUNTER
[facility] pharmacy called stating that they need to know the max units per day for the Novolog. Please advise thank you.

## 2022-05-20 ENCOUNTER — TELEPHONE (OUTPATIENT)
Dept: FAMILY MEDICINE CLINIC | Age: 72
End: 2022-05-20

## 2022-06-02 NOTE — TELEPHONE ENCOUNTER
Pt advised
Pt said this was changed by the transplant team .  Pt says he is to take a 1 mg and 2 .75 mg tablets twice daily.           Radha Thomas is calling to request a refill on the following medication(s):    Medication Request:  Requested Prescriptions     Pending Prescriptions Disp Refills    Everolimus 0.75 MG TABS 60 tablet 1     Sig: Take by mouth 2 times daily 3 tabs 2 times daily    Everolimus 1 MG TABS 60 tablet 1     Sig: Take 1 mg by mouth 2 times daily       Last Visit Date (If Applicable):  8/00/8262    Next Visit Date:    5/24/2021
99

## 2022-07-14 DIAGNOSIS — Z76.0 MEDICATION REFILL: ICD-10-CM

## 2022-07-14 RX ORDER — ATORVASTATIN CALCIUM 40 MG/1
TABLET, FILM COATED ORAL
Qty: 30 TABLET | Refills: 3 | Status: SHIPPED | OUTPATIENT
Start: 2022-07-14

## 2022-07-14 NOTE — TELEPHONE ENCOUNTER
Gabriele Kendall is calling to request a refill on the following medication(s):    Medication Request:  Requested Prescriptions     Pending Prescriptions Disp Refills    atorvastatin (LIPITOR) 40 MG tablet [Pharmacy Med Name: Atorvastatin Calcium 40MG TABS] 30 tablet 3     Sig: TAKE 1 TABLET BY MOUTH DAILY       Last Visit Date (If Applicable):  1/02/8279    Next Visit Date:    Visit date not found

## 2022-12-10 ENCOUNTER — NURSE TRIAGE (OUTPATIENT)
Dept: OTHER | Age: 72
End: 2022-12-10

## 2022-12-11 NOTE — TELEPHONE ENCOUNTER
Reason for Disposition   History of heart disease (i.e., heart attack, bypass surgery, angina, angioplasty, CHF) (Exception: brief heartbeat symptoms that went away and now feels well)    Answer Assessment - Initial Assessment Questions  1. DESCRIPTION: \"Please describe your heart rate or heartbeat that you are having\" (e.g., fast/slow, regular/irregular, skipped or extra beats, \"palpitations\")      Took his BP and heart rate is 103  2. ONSET: \"When did it start? \" (Minutes, hours or days)       Thursday and again this morning  3. DURATION: \"How long does it last\" (e.g., seconds, minutes, hours)      Not sure  4. PATTERN \"Does it come and go, or has it been constant since it started? \"  \"Does it get worse with exertion? \"   \"Are you feeling it now? \"      Alerted to elevated heart rate when taking his BP today. 5. TAP: \"Using your hand, can you tap out what you are feeling on a chair or table in front of you, so that I can hear? \" (Note: not all patients can do this)        *No Answer*  6. HEART RATE: \"Can you tell me your heart rate? \" \"How many beats in 15 seconds? \"  (Note: not all patients can do this)        103 recorded on his BP machine  7. RECURRENT SYMPTOM: \"Have you ever had this before? \" If Yes, ask: \"When was the last time? \" and \"What happened that time? \"       First episode was on Thursday  8. CAUSE: \"What do you think is causing the palpitations? \"      Not sure  9. CARDIAC HISTORY: \"Do you have any history of heart disease? \" (e.g., heart attack, angina, bypass surgery, angioplasty, arrhythmia)       History of CHF 4-5 months ago. 10. OTHER SYMPTOMS: \"Do you have any other symptoms? \" (e.g., dizziness, chest pain, sweating, difficulty breathing)        Denies dizziness, chest pain, sweating or difficulty breathing  11. PREGNANCY: \"Is there any chance you are pregnant? \" \"When was your last menstrual period? \"        *No Answer*  Had a kidney transplant in 2017, but had to start dialysis in March 2022.  Last dialysis was today. Protocols used: Heart Rate and Heartbeat Melonie Royal will go to [facility] now.

## 2023-04-26 ENCOUNTER — TELEPHONE (OUTPATIENT)
Dept: UROLOGY | Age: 73
End: 2023-04-26

## 2023-04-26 DIAGNOSIS — C61 PROSTATE CANCER (HCC): Primary | ICD-10-CM

## 2023-04-26 DIAGNOSIS — Z90.79 HISTORY OF ROBOT-ASSISTED LAPAROSCOPIC RADICAL PROSTATECTOMY: ICD-10-CM

## 2023-04-26 NOTE — TELEPHONE ENCOUNTER
I received refill request for incontinence supplies. Unfortunately he is overdue for fu so I cannot provide that at this time.      Please have him come in for an appt, needs psa prior to the appt

## 2023-05-08 ENCOUNTER — OFFICE VISIT (OUTPATIENT)
Dept: UROLOGY | Age: 73
End: 2023-05-08
Payer: MEDICARE

## 2023-05-08 ENCOUNTER — HOSPITAL ENCOUNTER (OUTPATIENT)
Age: 73
Discharge: HOME | End: 2023-05-08
Payer: MEDICARE

## 2023-05-08 VITALS
BODY MASS INDEX: 21.56 KG/M2 | TEMPERATURE: 98.6 F | RESPIRATION RATE: 16 BRPM | SYSTOLIC BLOOD PRESSURE: 118 MMHG | HEIGHT: 74 IN | WEIGHT: 168 LBS | DIASTOLIC BLOOD PRESSURE: 72 MMHG | HEART RATE: 65 BPM

## 2023-05-08 DIAGNOSIS — N39.3 STRESS INCONTINENCE OF URINE: ICD-10-CM

## 2023-05-08 DIAGNOSIS — Z90.79 HISTORY OF ROBOT-ASSISTED LAPAROSCOPIC RADICAL PROSTATECTOMY: Primary | ICD-10-CM

## 2023-05-08 DIAGNOSIS — C61 PROSTATE CANCER (HCC): ICD-10-CM

## 2023-05-08 DIAGNOSIS — Z90.79 HISTORY OF ROBOT-ASSISTED LAPAROSCOPIC RADICAL PROSTATECTOMY: ICD-10-CM

## 2023-05-08 DIAGNOSIS — N52.31 ERECTILE DYSFUNCTION AFTER RADICAL PROSTATECTOMY: ICD-10-CM

## 2023-05-08 LAB — PROSTATE SPECIFIC ANTIGEN: <0.02 NG/ML

## 2023-05-08 PROCEDURE — 1123F ACP DISCUSS/DSCN MKR DOCD: CPT | Performed by: NURSE PRACTITIONER

## 2023-05-08 PROCEDURE — 1036F TOBACCO NON-USER: CPT | Performed by: NURSE PRACTITIONER

## 2023-05-08 PROCEDURE — 3017F COLORECTAL CA SCREEN DOC REV: CPT | Performed by: NURSE PRACTITIONER

## 2023-05-08 PROCEDURE — 3078F DIAST BP <80 MM HG: CPT | Performed by: NURSE PRACTITIONER

## 2023-05-08 PROCEDURE — 3074F SYST BP LT 130 MM HG: CPT | Performed by: NURSE PRACTITIONER

## 2023-05-08 PROCEDURE — 84153 ASSAY OF PSA TOTAL: CPT

## 2023-05-08 PROCEDURE — G8427 DOCREV CUR MEDS BY ELIG CLIN: HCPCS | Performed by: NURSE PRACTITIONER

## 2023-05-08 PROCEDURE — G8420 CALC BMI NORM PARAMETERS: HCPCS | Performed by: NURSE PRACTITIONER

## 2023-05-08 PROCEDURE — 99214 OFFICE O/P EST MOD 30 MIN: CPT | Performed by: NURSE PRACTITIONER

## 2023-05-08 PROCEDURE — 36415 COLL VENOUS BLD VENIPUNCTURE: CPT

## 2023-05-08 ASSESSMENT — ENCOUNTER SYMPTOMS
COUGH: 0
NAUSEA: 0
DIARRHEA: 0
VOMITING: 0
SHORTNESS OF BREATH: 0
BACK PAIN: 0
EYE PAIN: 0
ABDOMINAL PAIN: 0
WHEEZING: 0
CONSTIPATION: 0

## 2023-05-08 NOTE — PROGRESS NOTES
[address] [address]  Dept: [address] [facility] Urology Office Note - Established    Patient:  Carmita Wilkins  YOB: 1950  Date: 5/8/2023    The patient is a 68 y.o. male who presents todayfor evaluation of the following problems:   Chief Complaint   Patient presents with    Follow-up     Psa - pt hasn't been seen in 2 years       HPI  This is a 67 yo male with hx of prostate cancer presenting for follow-up. He lost follow up over the last year and a half. Had RRP 5/19/2021, his last PSA was 2/9/2022  which was undetectable. I did place orders for PSA prior to this appointment, however pt did not complete as asked. He reports very rare and mild STALIN, he does not wear any pads or briefs. ED is chronic since surgery, he is not currently being treated for this issue. Does have underlying DM, which is another risk factor in addition to surgery. Hx ESRD, failed renal transplant, on dialysis 3x/week- awaiting another transplant. Denies any other  concerns today    Summary of old records: N/A    Additional History: N/A    Procedures Today: N/A    Urinalysis today:  No results found for this visit on 05/08/23.   Last several PSA's:  Lab Results   Component Value Date    PSA <0.02 02/09/2022    PSA 12.22 (H) 08/14/2020    PSA 3.2 02/16/2018     Last total testosterone:  No results found for: TESTOSTERONE      Last BUN and creatinine:  Lab Results   Component Value Date    BUN 83 (H) 02/09/2022     Lab Results   Component Value Date    CREATININE 3.51 (H) 03/09/2023       Additional Lab/Culture results: none    Imaging Reviewed during this Office Visit: none    PAST MEDICAL, FAMILY AND SOCIAL HISTORY UPDATE:  Past Medical History:   Diagnosis Date    Anemia     CAD (coronary artery disease)     ([address])    CKD (chronic kidney disease) stage 4, GFR 15-29 ml/min ([address]

## 2023-10-23 ENCOUNTER — TELEPHONE (OUTPATIENT)
Dept: GASTROENTEROLOGY | Age: 73
End: 2023-10-23

## 2023-10-23 NOTE — TELEPHONE ENCOUNTER
Patient LVM to schedule a colonoscopy. Last one looks like it was 10/30/19. However, could not locate any reports.

## 2023-10-24 NOTE — TELEPHONE ENCOUNTER
Writer notes pt is established with [facility] last colon was 10/30/19 with 5 yr recall dx polyps. Writer notes pt is not due for another colon until 10/2024, Shane Jenkins called pt back and advised him to call the office next year to schedule colon, pt voices understanding.

## 2023-12-12 DIAGNOSIS — C61 PROSTATE CANCER (HCC): Primary | ICD-10-CM

## 2024-01-05 ENCOUNTER — TELEPHONE (OUTPATIENT)
Dept: UROLOGY | Age: 74
End: 2024-01-05

## 2024-01-05 NOTE — TELEPHONE ENCOUNTER
Pt informed to complete PSA for 1/11 apt. Pt will call office to have order faxed to [facility].

## 2024-01-30 LAB — PROSTATE SPECIFIC ANTIGEN: 0.1 NG/ML

## 2024-02-01 ENCOUNTER — OFFICE VISIT (OUTPATIENT)
Dept: UROLOGY | Age: 74
End: 2024-02-01
Payer: MEDICARE

## 2024-02-01 VITALS — BODY MASS INDEX: 21.56 KG/M2 | WEIGHT: 168 LBS | TEMPERATURE: 96.9 F | HEIGHT: 74 IN

## 2024-02-01 DIAGNOSIS — C61 PROSTATE CANCER (HCC): Primary | ICD-10-CM

## 2024-02-01 DIAGNOSIS — C61 PROSTATE CANCER (HCC): ICD-10-CM

## 2024-02-01 DIAGNOSIS — N39.3 STRESS INCONTINENCE OF URINE: ICD-10-CM

## 2024-02-01 PROCEDURE — 99213 OFFICE O/P EST LOW 20 MIN: CPT | Performed by: UROLOGY

## 2024-02-01 PROCEDURE — 1036F TOBACCO NON-USER: CPT | Performed by: UROLOGY

## 2024-02-01 PROCEDURE — G8427 DOCREV CUR MEDS BY ELIG CLIN: HCPCS | Performed by: UROLOGY

## 2024-02-01 PROCEDURE — 3017F COLORECTAL CA SCREEN DOC REV: CPT | Performed by: UROLOGY

## 2024-02-01 PROCEDURE — G8484 FLU IMMUNIZE NO ADMIN: HCPCS | Performed by: UROLOGY

## 2024-02-01 PROCEDURE — 1123F ACP DISCUSS/DSCN MKR DOCD: CPT | Performed by: UROLOGY

## 2024-02-01 PROCEDURE — G8420 CALC BMI NORM PARAMETERS: HCPCS | Performed by: UROLOGY

## 2024-02-01 ASSESSMENT — ENCOUNTER SYMPTOMS
VOMITING: 0
EYE REDNESS: 0
EYES NEGATIVE: 1
DIARRHEA: 0
RESPIRATORY NEGATIVE: 1
ABDOMINAL PAIN: 1
SHORTNESS OF BREATH: 0
BACK PAIN: 0
EYE PAIN: 0
CONSTIPATION: 0
COUGH: 0
WHEEZING: 0
NAUSEA: 0

## 2024-02-01 NOTE — PROGRESS NOTES
Review of Systems   Constitutional: Negative.  Negative for appetite change, chills and fatigue.   Eyes: Negative.  Negative for pain, redness and visual disturbance.   Respiratory: Negative.  Negative for cough, shortness of breath and wheezing.    Cardiovascular: Negative.  Negative for chest pain and leg swelling.   Gastrointestinal:  Positive for abdominal pain (incision site). Negative for constipation, diarrhea, nausea and vomiting.   Genitourinary: Negative.  Negative for difficulty urinating, dysuria, flank pain, frequency, hematuria and urgency.   Musculoskeletal: Negative.  Negative for back pain, joint swelling and myalgias.   Skin: Negative.  Negative for rash and wound.   Neurological: Negative.  Negative for dizziness, weakness and numbness.   Hematological: Negative.  Does not bruise/bleed easily.     
30 tablet 3    insulin aspart (NOVOLOG FLEXPEN) 100 UNIT/ML injection pen Inject into the skin per sliding scale; -200 inject 4 units -259 inject 6 units -300 inject 8 units BS >300 inject 10 units 5 pen 5    famotidine (PEPCID) 20 MG tablet Take 1 tablet by mouth daily 90 tablet 3    bumetanide (BUMEX) 1 MG tablet Take 1 tablet by mouth daily 90 tablet 3    Insulin Pen Needle (PEN NEEDLES) 32G X 4 MM MISC 4 pens by Does not apply route 4 times daily 350 each 5    hydrALAZINE (APRESOLINE) 10 MG tablet TAKE ONE TABLET BY MOUTH EVERY 8 HOURS (Patient taking differently: 5 tablets) 90 tablet 0    sodium chloride 1 g tablet Take 1 tablet by mouth 2 times daily 90 tablet 5    insulin glargine (BASAGLAR KWIKPEN) 100 UNIT/ML injection pen Inject 12 Units into the skin nightly 5 pen 0    Everolimus 0.25 MG TABS Take 4 tablets by mouth 2 times daily      metoprolol succinate (TOPROL XL) 100 MG extended release tablet Take 1 tablet by mouth daily      predniSONE (DELTASONE) 5 MG tablet Take by mouth daily      blood glucose monitor strips Test 4 times a day & as needed for symptoms of irregular blood glucose. Dispense sufficient amount for indicated testing frequency plus additional to accommodate PRN testing needs. 350 strip 5    Lancets Ultra Thin MISC 1 Device by Does not apply route 4 times daily 500 each 1    blood glucose monitor strips Test blood sugar 4 times daily 500 strip 1    acetaminophen (TYLENOL) 500 MG tablet Take 2 tablets by mouth 3 times daily (Patient taking differently: Take 2 tablets by mouth 3 times daily as needed) 540 tablet 1    Alcohol Swabs PADS 1 box by Does not apply route 4 times daily 100 each 5    metoprolol succinate (TOPROL XL) 50 MG extended release tablet Take 1 tablet by mouth daily 90 tablet 1    NIFEdipine (PROCARDIA XL) 90 MG extended release tablet Take 1 tablet by mouth daily 90 tablet 1    Lancets MISC 1 each by Does not apply route 4 times daily 600 each 5

## 2024-12-09 ENCOUNTER — HOSPITAL ENCOUNTER (OUTPATIENT)
Age: 74
Setting detail: SPECIMEN
Discharge: HOME | End: 2024-12-09

## 2024-12-09 LAB
ANION GAP SERPL CALCULATED.3IONS-SCNC: 12 MMOL/L (ref 9–16)
BUN SERPL-MCNC: 54 MG/DL (ref 8–23)
CALCIUM SERPL-MCNC: 10 MG/DL (ref 8.8–10.2)
CHLORIDE SERPL-SCNC: 106 MMOL/L (ref 98–107)
CO2 SERPL-SCNC: 21 MMOL/L (ref 20–31)
CREAT SERPL-MCNC: 2.3 MG/DL (ref 0.7–1.2)
ERYTHROCYTE [DISTWIDTH] IN BLOOD BY AUTOMATED COUNT: 14.4 % (ref 11.8–14.4)
GFR, ESTIMATED: 30 ML/MIN/1.73M2
GLUCOSE SERPL-MCNC: 133 MG/DL (ref 82–115)
HCT VFR BLD AUTO: 31.7 % (ref 40.7–50.3)
HGB BLD-MCNC: 9.8 G/DL (ref 13–17)
MAGNESIUM SERPL-MCNC: 2 MG/DL (ref 1.6–2.4)
MCH RBC QN AUTO: 31.2 PG (ref 25.2–33.5)
MCHC RBC AUTO-ENTMCNC: 30.9 G/DL (ref 28.4–34.8)
MCV RBC AUTO: 101 FL (ref 82.6–102.9)
NRBC BLD-RTO: 0 PER 100 WBC
PHOSPHATE SERPL-MCNC: 3 MG/DL (ref 2.5–4.5)
PLATELET # BLD AUTO: 168 K/UL (ref 138–453)
PMV BLD AUTO: 11.7 FL (ref 8.1–13.5)
POTASSIUM SERPL-SCNC: 4.2 MMOL/L (ref 3.7–5.3)
RBC # BLD AUTO: 3.14 M/UL (ref 4.21–5.77)
SODIUM SERPL-SCNC: 140 MMOL/L (ref 136–145)
WBC OTHER # BLD: 7.2 K/UL (ref 3.5–11.3)

## 2024-12-09 PROCEDURE — P9603 ONE-WAY ALLOW PRORATED MILES: HCPCS

## 2024-12-09 PROCEDURE — 80048 BASIC METABOLIC PNL TOTAL CA: CPT

## 2024-12-09 PROCEDURE — 84100 ASSAY OF PHOSPHORUS: CPT

## 2024-12-09 PROCEDURE — 83735 ASSAY OF MAGNESIUM: CPT

## 2024-12-09 PROCEDURE — 36415 COLL VENOUS BLD VENIPUNCTURE: CPT

## 2024-12-09 PROCEDURE — 85027 COMPLETE CBC AUTOMATED: CPT

## 2024-12-13 ENCOUNTER — HOSPITAL ENCOUNTER (OUTPATIENT)
Age: 74
Setting detail: SPECIMEN
Discharge: HOME | End: 2024-12-13

## 2024-12-13 LAB
ANION GAP SERPL CALCULATED.3IONS-SCNC: 11 MMOL/L (ref 9–16)
BUN SERPL-MCNC: 42 MG/DL (ref 8–23)
CALCIUM SERPL-MCNC: 9.5 MG/DL (ref 8.8–10.2)
CHLORIDE SERPL-SCNC: 107 MMOL/L (ref 98–107)
CO2 SERPL-SCNC: 21 MMOL/L (ref 20–31)
CREAT SERPL-MCNC: 2 MG/DL (ref 0.7–1.2)
ERYTHROCYTE [DISTWIDTH] IN BLOOD BY AUTOMATED COUNT: 15 % (ref 11.5–14.5)
GFR, ESTIMATED: 35 ML/MIN/1.73M2
GLUCOSE SERPL-MCNC: 216 MG/DL (ref 82–115)
HCT VFR BLD AUTO: 32.6 % (ref 41–53)
HGB BLD-MCNC: 9.7 G/DL (ref 13.5–17.5)
MCH RBC QN AUTO: 31.6 PG (ref 26–34)
MCHC RBC AUTO-ENTMCNC: 29.8 G/DL (ref 31–37)
MCV RBC AUTO: 106.2 FL (ref 80–100)
PLATELET # BLD AUTO: 165 K/UL (ref 130–400)
POTASSIUM SERPL-SCNC: 4.5 MMOL/L (ref 3.7–5.3)
RBC # BLD AUTO: 3.07 M/UL (ref 4.5–5.9)
SODIUM SERPL-SCNC: 139 MMOL/L (ref 136–145)
WBC OTHER # BLD: 4.4 K/UL (ref 3.5–11)

## 2024-12-13 PROCEDURE — 36415 COLL VENOUS BLD VENIPUNCTURE: CPT

## 2024-12-13 PROCEDURE — P9603 ONE-WAY ALLOW PRORATED MILES: HCPCS

## 2024-12-13 PROCEDURE — 80048 BASIC METABOLIC PNL TOTAL CA: CPT

## 2024-12-13 PROCEDURE — 85027 COMPLETE CBC AUTOMATED: CPT

## 2024-12-16 ENCOUNTER — HOSPITAL ENCOUNTER (OUTPATIENT)
Age: 74
Setting detail: SPECIMEN
Discharge: HOME | End: 2024-12-16

## 2025-03-14 NOTE — TELEPHONE ENCOUNTER
FYI Only     [facility] called to inform you that patient Confused medication yesterday took night meds around  6pm took a nap then woke up around 8 took morning meds.      He is feeling good today no complaints he skipped his morning meds today and will take nighttime meds tonight and get back on schedule
Noted
Statement Selected

## 2025-06-04 NOTE — TELEPHONE ENCOUNTER
Reason for Disposition   [1] Chest pain (or \"angina\") comes and goes AND [2] is happening more often (increasing in frequency) or getting worse (increasing in severity)    Answer Assessment - Initial Assessment Questions  1. LOCATION: \"Where does it hurt? \"        Lt side of chest   2. RADIATION: \"Does the pain go anywhere else? \" (e.g., into neck, jaw, arms, back)      Denies pressure like feeling   3. ONSET: \"When did the chest pain begin? \" (Minutes, hours or days)       Off and on for days. 4. PATTERN \"Does the pain come and go, or has it been constant since it started? \"  \"Does it get worse with exertion? \"       Off and on for days. Had stress test and some other test resently  5. DURATION: \"How long does it last\" (e.g., seconds, minutes, hours)      Worse when lying flat improves when sitting up  6. SEVERITY: \"How bad is the pain? \"  (e.g., Scale 1-10; mild, moderate, or severe)     - MILD (1-3): doesn't interfere with normal activities      - MODERATE (4-7): interferes with normal activities or awakens from sleep     - SEVERE (8-10): excruciating pain, unable to do any normal activities       Rates pain 8-10 when lying flat. When upright 6  7. CARDIAC RISK FACTORS: \"Do you have any history of heart problems or risk factors for heart disease? \" (e.g., angina, prior heart attack; diabetes, high blood pressure, high cholesterol, smoker, or strong family history of heart disease)      Diabetic, HTN. Past hx of COVID 19+ March 2020   8. PULMONARY RISK FACTORS: \"Do you have any history of lung disease? \"  (e.g., blood clots in lung, asthma, emphysema, birth control pills)       9. CAUSE: \"What do you think is causing the chest pain? \"      Doctor tring to figure it out  10. OTHER SYMPTOMS: \"Do you have any other symptoms? \" (e.g., dizziness, nausea, vomiting, sweating, fever, difficulty breathing, cough)       Headache daily, Currently has travel to [facility] via car. Due back in town today   6.  PREGNANCY: \"Is there any chance you are pregnant? \" \"When was your last menstrual period? \"    Protocols used: CHEST PAIN-ADULT-AH none

## (undated) DEVICE — Z DISCONTINUED USE 2272124 DRAPE SURG XL N INVASIVE 2 LAYR DISP

## (undated) DEVICE — GUIDEWIRE URO L150CM DIA0.035IN STIFF NIT HYDRPHLC STR TIP

## (undated) DEVICE — SUTURE V-LOC 180 SZ 3-0 L6IN ABSRB GRN V-20 L26MM 1/2 CIR VLOCL0604

## (undated) DEVICE — SOLUTION ANTIFOG VIS SYS CLEARIFY LAPSCP

## (undated) DEVICE — APPLICATOR MEDICATED 26 CC SOLUTION HI LT ORNG CHLORAPREP

## (undated) DEVICE — Z DUPLICATE USE 2517136 APPLICATOR LAP 45 CM 2 FLX VISTASEAL

## (undated) DEVICE — INTENDED FOR TISSUE SEPARATION, AND OTHER PROCEDURES THAT REQUIRE A SHARP SURGICAL BLADE TO PUNCTURE OR CUT.: Brand: BARD-PARKER ® CARBON RIB-BACK BLADES

## (undated) DEVICE — TIP COVER ACCESSORY

## (undated) DEVICE — GARMENT COMPR STD FOR 17IN CALF UNIF THER FLOTRN

## (undated) DEVICE — CANNULA SEAL

## (undated) DEVICE — COUNTER NDL 10 COUNT HLD 20 FOAM BLK SGL MAG

## (undated) DEVICE — Z DISCONTINUED USE 2717540 SUTURE ABSORBABLE MONOFILAMENT 3-0 RB 1 6 IN STRATAFIX SPRL

## (undated) DEVICE — SUTURE MCRYL + SZ 4 0 L18IN ABSRB UD PC 3 L16MM 3 8 CIR PRIM MCP845G

## (undated) DEVICE — SUTURE V-LOC 180 SZ 0 L9IN ABSRB GRN GS-21 L37MM 1/2 CIR VLOCL0346

## (undated) DEVICE — SYRINGE 20ML LL S/C 50

## (undated) DEVICE — BLADELESS OBTURATOR: Brand: WECK VISTA

## (undated) DEVICE — MARKER,SKIN,WI/RULER AND LABELS: Brand: MEDLINE

## (undated) DEVICE — INSUFFLATION NEEDLE TO ESTABLISH PNEUMOPERITONEUM.: Brand: INSUFFLATION NEEDLE

## (undated) DEVICE — FORCEPS BX L240CM WRK CHN 2.8MM STD CAP W/ NDL MIC MESH

## (undated) DEVICE — POSITIONER,HEAD,MULTIRING,36CS: Brand: MEDLINE

## (undated) DEVICE — GLOVE EXAM M L95IN FNGR THK35MIL PALM THK24MIL OFF WHT

## (undated) DEVICE — TROCAR: Brand: KII FIOS FIRST ENTRY

## (undated) DEVICE — METER,URINE,400ML,DRAIN BAG,L/F,LL: Brand: MEDLINE

## (undated) DEVICE — PROTECTOR ULN NRV PUR FOAM HK LOOP STRP ANATOMICALLY

## (undated) DEVICE — ELECTRO LUBE IS A SINGLE PATIENT USE DEVICE THAT IS INTENDED TO BE USED ON ELECTROSURGICAL ELECTRODES TO REDUCE STICKING.: Brand: KEY SURGICAL ELECTRO LUBE

## (undated) DEVICE — CLIP INT XL YEL POLYMER HEM-O-LOK WECK

## (undated) DEVICE — SYRINGE MED 10ML LUERLOCK TIP W/O SFTY DISP

## (undated) DEVICE — PLUMEPORT SEO LAPAROSCOPIC SMOKE FILTRATION DEVICE: Brand: PLUMEPORT

## (undated) DEVICE — SUTURE MCRYL SZ 3-0 L27IN ABSRB VLT L17MM RB-1 1/2 CIR Y305H

## (undated) DEVICE — CATHETER URETH 18FR BLLN 30CC 2 W F INF CTRL BARDX

## (undated) DEVICE — STENT URET 6FR L24CM PERCFLX HYDR+ DBL PGTL THRD 2: Type: IMPLANTABLE DEVICE | Status: NON-FUNCTIONAL

## (undated) DEVICE — SCISSOR SURG METZ CRV TIP

## (undated) DEVICE — ADHESIVE SKIN CLSR 0.7ML TOP DERMBND ADV

## (undated) DEVICE — NEEDLE HYPO 25GA L1.5IN BLU POLYPR HUB S STL REG BVL STR

## (undated) DEVICE — 3M™ IOBAN™ 2 ANTIMICROBIAL INCISE DRAPE 6650EZ: Brand: IOBAN™ 2

## (undated) DEVICE — GOWN,POLY REINFORCED,LG: Brand: MEDLINE

## (undated) DEVICE — SEALANT TISS 10 CC FIBRIN VISTASEAL

## (undated) DEVICE — GARMENT,MEDLINE,DVT,INT,CALF,MED, GEN2: Brand: MEDLINE

## (undated) DEVICE — GLOVE ORTHO 7 1/2   MSG9475

## (undated) DEVICE — MAX-CORE® DISPOSABLE CORE BIOPSY INSTRUMENT, 18G X 20CM: Brand: MAX-CORE

## (undated) DEVICE — C-ARM: Brand: UNBRANDED

## (undated) DEVICE — JELLY,LUBE,STERILE,FLIP TOP,TUBE,2-OZ: Brand: MEDLINE

## (undated) DEVICE — GLOVE ORANGE PI 7 1/2   MSG9075

## (undated) DEVICE — Device

## (undated) DEVICE — GLOVE ORANGE PI 7   MSG9070

## (undated) DEVICE — SUTURE PDS II SZ 0 L27IN ABSRB VLT L36MM CT-1 1/2 CIR Z340H

## (undated) DEVICE — PAD,NON-ADHERENT,3X8,STERILE,LF,1/PK: Brand: MEDLINE

## (undated) DEVICE — TOWEL,OR,DSP,ST,NATURAL,DLX,4/PK,20PK/CS: Brand: MEDLINE

## (undated) DEVICE — CONTAINER,SPECIMEN,4OZ,OR STRL: Brand: MEDLINE

## (undated) DEVICE — AIRSEAL 12 MM ACCESS PORT AND PALM GRIP OBTURATOR WITH BLADELESS OPTICAL TIP, 120 MM LENGTH: Brand: AIRSEAL

## (undated) DEVICE — ARM DRAPE

## (undated) DEVICE — DEFENDO AIR WATER SUCTION AND BIOPSY VALVE KIT FOR  OLYMPUS: Brand: DEFENDO AIR/WATER/SUCTION AND BIOPSY VALVE

## (undated) DEVICE — COVER,LIGHT HANDLE,FLX,2/PK: Brand: MEDLINE INDUSTRIES, INC.

## (undated) DEVICE — UNDERPAD HOSP W30XL36IN GRN POLYPR HI ABSRB DISP

## (undated) DEVICE — 40580 - THE PINK PAD - ADVANCED TRENDELENBURG POSITIONING KIT: Brand: 40580 - THE PINK PAD - ADVANCED TRENDELENBURG POSITIONING KIT

## (undated) DEVICE — PACK PROCEDURE SURG CYSTO SVMMC LF

## (undated) DEVICE — NEEDLE SPNL 22GA L7IN SPINOCAN